# Patient Record
Sex: FEMALE | Race: BLACK OR AFRICAN AMERICAN | Employment: OTHER | URBAN - METROPOLITAN AREA
[De-identification: names, ages, dates, MRNs, and addresses within clinical notes are randomized per-mention and may not be internally consistent; named-entity substitution may affect disease eponyms.]

---

## 2017-01-23 ENCOUNTER — TELEPHONE (OUTPATIENT)
Dept: INTERNAL MEDICINE | Age: 77
End: 2017-01-23

## 2017-01-27 ENCOUNTER — OFFICE VISIT (OUTPATIENT)
Dept: INTERNAL MEDICINE | Age: 77
End: 2017-01-27

## 2017-01-27 VITALS
WEIGHT: 221 LBS | BODY MASS INDEX: 40.67 KG/M2 | HEIGHT: 62 IN | SYSTOLIC BLOOD PRESSURE: 120 MMHG | DIASTOLIC BLOOD PRESSURE: 82 MMHG

## 2017-01-27 DIAGNOSIS — I26.99 OTHER PULMONARY EMBOLISM WITHOUT ACUTE COR PULMONALE, UNSPECIFIED CHRONICITY (HCC): ICD-10-CM

## 2017-01-27 DIAGNOSIS — R91.8 ABNORMAL CT SCAN, LUNG: ICD-10-CM

## 2017-01-27 DIAGNOSIS — I10 ESSENTIAL HYPERTENSION, BENIGN: ICD-10-CM

## 2017-01-27 DIAGNOSIS — K21.9 GASTROESOPHAGEAL REFLUX DISEASE WITHOUT ESOPHAGITIS: ICD-10-CM

## 2017-01-27 DIAGNOSIS — N30.00 ACUTE CYSTITIS WITHOUT HEMATURIA: Primary | ICD-10-CM

## 2017-01-27 LAB
BILIRUBIN, POC: ABNORMAL
BLOOD URINE, POC: ABNORMAL
CLARITY, POC: CLEAR
COLOR, POC: YELLOW
GLUCOSE URINE, POC: ABNORMAL
INR BLD: 2.14 (ref 0.85–1.16)
KETONES, POC: ABNORMAL
LEUKOCYTE EST, POC: ABNORMAL
NITRITE, POC: ABNORMAL
PH, POC: 5
PROTEIN, POC: ABNORMAL
PROTHROMBIN TIME: 24.8 SEC (ref 9.8–13)
SPECIFIC GRAVITY, POC: 1.02
UROBILINOGEN, POC: 2

## 2017-01-27 PROCEDURE — 99213 OFFICE O/P EST LOW 20 MIN: CPT | Performed by: INTERNAL MEDICINE

## 2017-01-27 PROCEDURE — 81002 URINALYSIS NONAUTO W/O SCOPE: CPT | Performed by: INTERNAL MEDICINE

## 2017-01-27 RX ORDER — NITROFURANTOIN 25; 75 MG/1; MG/1
100 CAPSULE ORAL 2 TIMES DAILY
Qty: 14 CAPSULE | Refills: 0 | Status: SHIPPED | OUTPATIENT
Start: 2017-01-27 | End: 2017-02-03

## 2017-01-29 LAB — URINE CULTURE, ROUTINE: NORMAL

## 2017-01-31 ENCOUNTER — ANTI-COAG VISIT (OUTPATIENT)
Dept: INTERNAL MEDICINE | Age: 77
End: 2017-01-31

## 2017-02-06 ENCOUNTER — TELEPHONE (OUTPATIENT)
Dept: INTERNAL MEDICINE | Age: 77
End: 2017-02-06

## 2017-02-10 ENCOUNTER — OFFICE VISIT (OUTPATIENT)
Dept: INTERNAL MEDICINE | Age: 77
End: 2017-02-10

## 2017-02-10 VITALS
DIASTOLIC BLOOD PRESSURE: 86 MMHG | HEIGHT: 62 IN | WEIGHT: 216 LBS | SYSTOLIC BLOOD PRESSURE: 120 MMHG | BODY MASS INDEX: 39.75 KG/M2

## 2017-02-10 DIAGNOSIS — K21.9 GASTROESOPHAGEAL REFLUX DISEASE WITHOUT ESOPHAGITIS: ICD-10-CM

## 2017-02-10 DIAGNOSIS — K57.92 DIVERTICULITIS OF INTESTINE WITHOUT PERFORATION OR ABSCESS WITHOUT BLEEDING, UNSPECIFIED PART OF INTESTINAL TRACT: Primary | ICD-10-CM

## 2017-02-10 LAB
A/G RATIO: 0.8 (ref 1.1–2.2)
ALBUMIN SERPL-MCNC: 3.7 G/DL (ref 3.4–5)
ALP BLD-CCNC: 64 U/L (ref 40–129)
ALT SERPL-CCNC: 9 U/L (ref 10–40)
ANION GAP SERPL CALCULATED.3IONS-SCNC: 17 MMOL/L (ref 3–16)
AST SERPL-CCNC: 19 U/L (ref 15–37)
BACTERIA: ABNORMAL /HPF
BILIRUB SERPL-MCNC: 0.4 MG/DL (ref 0–1)
BILIRUBIN URINE: ABNORMAL
BLOOD, URINE: NEGATIVE
BUN BLDV-MCNC: 13 MG/DL (ref 7–20)
CALCIUM SERPL-MCNC: 10.1 MG/DL (ref 8.3–10.6)
CHLORIDE BLD-SCNC: 104 MMOL/L (ref 99–110)
CLARITY: ABNORMAL
CO2: 23 MMOL/L (ref 21–32)
COLOR: YELLOW
CREAT SERPL-MCNC: 1.2 MG/DL (ref 0.6–1.2)
EPITHELIAL CELLS, UA: 15 /HPF (ref 0–5)
GFR AFRICAN AMERICAN: 52
GFR NON-AFRICAN AMERICAN: 43
GLOBULIN: 4.7 G/DL
GLUCOSE BLD-MCNC: 95 MG/DL (ref 70–99)
GLUCOSE URINE: NEGATIVE MG/DL
HYALINE CASTS: 10 /HPF (ref 0–8)
KETONES, URINE: ABNORMAL MG/DL
LEUKOCYTE ESTERASE, URINE: ABNORMAL
MICROSCOPIC EXAMINATION: YES
NITRITE, URINE: NEGATIVE
PH UA: 5
POTASSIUM SERPL-SCNC: 4.4 MMOL/L (ref 3.5–5.1)
PROTEIN UA: ABNORMAL MG/DL
RBC UA: 2 /HPF (ref 0–4)
SODIUM BLD-SCNC: 144 MMOL/L (ref 136–145)
SPECIFIC GRAVITY UA: 1.02
TOTAL PROTEIN: 8.4 G/DL (ref 6.4–8.2)
URINE TYPE: ABNORMAL
UROBILINOGEN, URINE: 0.2 E.U./DL
WBC UA: 13 /HPF (ref 0–5)

## 2017-02-10 PROCEDURE — 99213 OFFICE O/P EST LOW 20 MIN: CPT | Performed by: INTERNAL MEDICINE

## 2017-02-10 RX ORDER — DOXYCYCLINE HYCLATE 100 MG
100 TABLET ORAL 2 TIMES DAILY
Qty: 20 TABLET | Refills: 0 | Status: SHIPPED | OUTPATIENT
Start: 2017-02-10 | End: 2017-02-20

## 2017-02-10 ASSESSMENT — ENCOUNTER SYMPTOMS
ABDOMINAL PAIN: 1
NAUSEA: 1
SHORTNESS OF BREATH: 0
COUGH: 1
VOMITING: 1
BLOOD IN STOOL: 0
TROUBLE SWALLOWING: 0
DIARRHEA: 0
CONSTIPATION: 1
RECTAL PAIN: 1
CHEST TIGHTNESS: 0

## 2017-02-11 LAB
BASOPHILS ABSOLUTE: 0 K/UL (ref 0–0.2)
BASOPHILS RELATIVE PERCENT: 0.5 %
EOSINOPHILS ABSOLUTE: 0 K/UL (ref 0–0.6)
EOSINOPHILS RELATIVE PERCENT: 0.7 %
HCT VFR BLD CALC: 39.8 % (ref 36–48)
HEMOGLOBIN: 13 G/DL (ref 12–16)
LYMPHOCYTES ABSOLUTE: 1.2 K/UL (ref 1–5.1)
LYMPHOCYTES RELATIVE PERCENT: 38.3 %
MCH RBC QN AUTO: 29.4 PG (ref 26–34)
MCHC RBC AUTO-ENTMCNC: 32.7 G/DL (ref 31–36)
MCV RBC AUTO: 89.9 FL (ref 80–100)
MONOCYTES ABSOLUTE: 0.3 K/UL (ref 0–1.3)
MONOCYTES RELATIVE PERCENT: 9.8 %
NEUTROPHILS ABSOLUTE: 1.6 K/UL (ref 1.7–7.7)
NEUTROPHILS RELATIVE PERCENT: 50.7 %
PDW BLD-RTO: 15.5 % (ref 12.4–15.4)
PLATELET # BLD: 202 K/UL (ref 135–450)
PMV BLD AUTO: 10.2 FL (ref 5–10.5)
RBC # BLD: 4.42 M/UL (ref 4–5.2)
SEDIMENTATION RATE, ERYTHROCYTE: 67 MM/HR (ref 0–30)
WBC # BLD: 3.1 K/UL (ref 4–11)

## 2017-02-13 ENCOUNTER — TELEPHONE (OUTPATIENT)
Dept: INTERNAL MEDICINE | Age: 77
End: 2017-02-13

## 2017-02-13 RX ORDER — PROMETHAZINE HYDROCHLORIDE 12.5 MG/1
12.5 TABLET ORAL EVERY 6 HOURS PRN
Qty: 15 TABLET | Refills: 0 | Status: SHIPPED | OUTPATIENT
Start: 2017-02-13 | End: 2017-02-20

## 2017-02-15 ENCOUNTER — TELEPHONE (OUTPATIENT)
Dept: INTERNAL MEDICINE | Age: 77
End: 2017-02-15

## 2017-02-15 RX ORDER — ONDANSETRON 4 MG/1
4 TABLET, ORALLY DISINTEGRATING ORAL EVERY 8 HOURS PRN
Qty: 20 TABLET | Refills: 0 | Status: SHIPPED | OUTPATIENT
Start: 2017-02-15 | End: 2018-08-17 | Stop reason: CLARIF

## 2017-02-20 ENCOUNTER — OFFICE VISIT (OUTPATIENT)
Dept: INTERNAL MEDICINE | Age: 77
End: 2017-02-20

## 2017-02-20 VITALS
HEIGHT: 62 IN | SYSTOLIC BLOOD PRESSURE: 122 MMHG | BODY MASS INDEX: 39.93 KG/M2 | DIASTOLIC BLOOD PRESSURE: 88 MMHG | WEIGHT: 217 LBS

## 2017-02-20 DIAGNOSIS — K59.00 CONSTIPATION, UNSPECIFIED CONSTIPATION TYPE: ICD-10-CM

## 2017-02-20 DIAGNOSIS — R10.84 GENERALIZED ABDOMINAL PAIN: Primary | ICD-10-CM

## 2017-02-20 PROCEDURE — 99213 OFFICE O/P EST LOW 20 MIN: CPT | Performed by: INTERNAL MEDICINE

## 2017-02-20 ASSESSMENT — ENCOUNTER SYMPTOMS
CHEST TIGHTNESS: 0
RECTAL PAIN: 0
SHORTNESS OF BREATH: 0
DIARRHEA: 0
BLOOD IN STOOL: 0
NAUSEA: 0
COUGH: 1
ABDOMINAL PAIN: 1
CONSTIPATION: 0
VOMITING: 0

## 2017-02-23 ENCOUNTER — TELEPHONE (OUTPATIENT)
Dept: INTERNAL MEDICINE | Age: 77
End: 2017-02-23

## 2017-03-02 RX ORDER — AMLODIPINE BESYLATE 5 MG/1
TABLET ORAL
Qty: 90 TABLET | Refills: 0 | Status: SHIPPED | OUTPATIENT
Start: 2017-03-02 | End: 2017-06-21 | Stop reason: SDUPTHER

## 2017-03-16 ENCOUNTER — OFFICE VISIT (OUTPATIENT)
Dept: INTERNAL MEDICINE | Age: 77
End: 2017-03-16

## 2017-03-16 VITALS
SYSTOLIC BLOOD PRESSURE: 128 MMHG | WEIGHT: 207 LBS | BODY MASS INDEX: 38.09 KG/M2 | HEIGHT: 62 IN | DIASTOLIC BLOOD PRESSURE: 84 MMHG

## 2017-03-16 DIAGNOSIS — I10 ESSENTIAL HYPERTENSION, BENIGN: ICD-10-CM

## 2017-03-16 DIAGNOSIS — K21.9 GASTROESOPHAGEAL REFLUX DISEASE WITHOUT ESOPHAGITIS: ICD-10-CM

## 2017-03-16 DIAGNOSIS — I26.99 OTHER PULMONARY EMBOLISM WITHOUT ACUTE COR PULMONALE, UNSPECIFIED CHRONICITY (HCC): ICD-10-CM

## 2017-03-16 DIAGNOSIS — K21.9 GASTROESOPHAGEAL REFLUX DISEASE WITHOUT ESOPHAGITIS: Primary | ICD-10-CM

## 2017-03-16 LAB
A/G RATIO: 0.9 (ref 1.1–2.2)
ALBUMIN SERPL-MCNC: 3.8 G/DL (ref 3.4–5)
ALP BLD-CCNC: 62 U/L (ref 40–129)
ALT SERPL-CCNC: 10 U/L (ref 10–40)
ANION GAP SERPL CALCULATED.3IONS-SCNC: 16 MMOL/L (ref 3–16)
AST SERPL-CCNC: 16 U/L (ref 15–37)
BASOPHILS ABSOLUTE: 0 K/UL (ref 0–0.2)
BASOPHILS RELATIVE PERCENT: 0.4 %
BILIRUB SERPL-MCNC: 0.4 MG/DL (ref 0–1)
BUN BLDV-MCNC: 15 MG/DL (ref 7–20)
CALCIUM SERPL-MCNC: 9.9 MG/DL (ref 8.3–10.6)
CHLORIDE BLD-SCNC: 105 MMOL/L (ref 99–110)
CO2: 23 MMOL/L (ref 21–32)
CREAT SERPL-MCNC: 1.2 MG/DL (ref 0.6–1.2)
EOSINOPHILS ABSOLUTE: 0 K/UL (ref 0–0.6)
EOSINOPHILS RELATIVE PERCENT: 0.5 %
GFR AFRICAN AMERICAN: 52
GFR NON-AFRICAN AMERICAN: 43
GLOBULIN: 4.4 G/DL
GLUCOSE BLD-MCNC: 93 MG/DL (ref 70–99)
HCT VFR BLD CALC: 40.6 % (ref 36–48)
HEMOGLOBIN: 13 G/DL (ref 12–16)
INR BLD: 1.81 (ref 0.85–1.15)
LYMPHOCYTES ABSOLUTE: 0.9 K/UL (ref 1–5.1)
LYMPHOCYTES RELATIVE PERCENT: 39.2 %
MCH RBC QN AUTO: 29.5 PG (ref 26–34)
MCHC RBC AUTO-ENTMCNC: 31.9 G/DL (ref 31–36)
MCV RBC AUTO: 92.3 FL (ref 80–100)
MONOCYTES ABSOLUTE: 0.2 K/UL (ref 0–1.3)
MONOCYTES RELATIVE PERCENT: 10.8 %
NEUTROPHILS ABSOLUTE: 1.1 K/UL (ref 1.7–7.7)
NEUTROPHILS RELATIVE PERCENT: 49.1 %
PDW BLD-RTO: 16.4 % (ref 12.4–15.4)
PLATELET # BLD: 190 K/UL (ref 135–450)
PMV BLD AUTO: 10.2 FL (ref 5–10.5)
POTASSIUM SERPL-SCNC: 4.5 MMOL/L (ref 3.5–5.1)
PROTHROMBIN TIME: 20.4 SEC (ref 9.6–13)
RBC # BLD: 4.4 M/UL (ref 4–5.2)
SODIUM BLD-SCNC: 144 MMOL/L (ref 136–145)
TOTAL PROTEIN: 8.2 G/DL (ref 6.4–8.2)
WBC # BLD: 2.3 K/UL (ref 4–11)

## 2017-03-16 PROCEDURE — 99213 OFFICE O/P EST LOW 20 MIN: CPT | Performed by: INTERNAL MEDICINE

## 2017-03-17 ENCOUNTER — TELEPHONE (OUTPATIENT)
Dept: INTERNAL MEDICINE | Age: 77
End: 2017-03-17

## 2017-03-17 DIAGNOSIS — I10 ESSENTIAL HYPERTENSION, BENIGN: Primary | ICD-10-CM

## 2017-03-20 LAB
ALBUMIN SERPL-MCNC: 3.2 G/DL (ref 3.1–4.9)
ALPHA-1-GLOBULIN: 0.3 G/DL (ref 0.2–0.4)
ALPHA-2-GLOBULIN: 0.7 G/DL (ref 0.4–1.1)
BETA GLOBULIN: 1.4 G/DL (ref 0.9–1.6)
GAMMA GLOBULIN: 2.5 G/DL (ref 0.6–1.8)
SPE/IFE INTERPRETATION: NORMAL

## 2017-03-22 ENCOUNTER — ANTI-COAG VISIT (OUTPATIENT)
Dept: INTERNAL MEDICINE | Age: 77
End: 2017-03-22

## 2017-04-27 ENCOUNTER — TELEPHONE (OUTPATIENT)
Dept: CASE MANAGEMENT | Age: 77
End: 2017-04-27

## 2017-04-28 RX ORDER — OMEPRAZOLE 20 MG/1
CAPSULE, DELAYED RELEASE ORAL
Qty: 90 CAPSULE | Refills: 0 | Status: SHIPPED | OUTPATIENT
Start: 2017-04-28 | End: 2017-09-19 | Stop reason: SDUPTHER

## 2017-05-09 ENCOUNTER — TELEPHONE (OUTPATIENT)
Dept: INTERNAL MEDICINE | Age: 77
End: 2017-05-09

## 2017-05-09 ENCOUNTER — OFFICE VISIT (OUTPATIENT)
Dept: INTERNAL MEDICINE | Age: 77
End: 2017-05-09

## 2017-05-09 VITALS
BODY MASS INDEX: 35.7 KG/M2 | DIASTOLIC BLOOD PRESSURE: 84 MMHG | HEIGHT: 62 IN | SYSTOLIC BLOOD PRESSURE: 130 MMHG | WEIGHT: 194 LBS

## 2017-05-09 DIAGNOSIS — R91.1 LUNG NODULE: ICD-10-CM

## 2017-05-09 DIAGNOSIS — E55.9 VITAMIN D DEFICIENCY: ICD-10-CM

## 2017-05-09 DIAGNOSIS — I26.99 OTHER PULMONARY EMBOLISM WITHOUT ACUTE COR PULMONALE, UNSPECIFIED CHRONICITY (HCC): ICD-10-CM

## 2017-05-09 DIAGNOSIS — I10 ESSENTIAL HYPERTENSION, BENIGN: ICD-10-CM

## 2017-05-09 DIAGNOSIS — K21.9 GASTROESOPHAGEAL REFLUX DISEASE, ESOPHAGITIS PRESENCE NOT SPECIFIED: ICD-10-CM

## 2017-05-09 DIAGNOSIS — K42.9 UMBILICAL HERNIA WITHOUT OBSTRUCTION AND WITHOUT GANGRENE: ICD-10-CM

## 2017-05-09 DIAGNOSIS — M19.90 OSTEOARTHRITIS, UNSPECIFIED OSTEOARTHRITIS TYPE, UNSPECIFIED SITE: ICD-10-CM

## 2017-05-09 DIAGNOSIS — R91.8 ABNORMAL CT SCAN, LUNG: ICD-10-CM

## 2017-05-09 DIAGNOSIS — K21.9 GASTROESOPHAGEAL REFLUX DISEASE WITHOUT ESOPHAGITIS: ICD-10-CM

## 2017-05-09 DIAGNOSIS — Z00.00 PE (PHYSICAL EXAM), ANNUAL: Primary | ICD-10-CM

## 2017-05-09 LAB
A/G RATIO: 0.9 (ref 1.1–2.2)
ALBUMIN SERPL-MCNC: 3.8 G/DL (ref 3.4–5)
ALP BLD-CCNC: 61 U/L (ref 40–129)
ALT SERPL-CCNC: 10 U/L (ref 10–40)
AMORPHOUS: ABNORMAL /HPF
ANION GAP SERPL CALCULATED.3IONS-SCNC: 16 MMOL/L (ref 3–16)
AST SERPL-CCNC: 19 U/L (ref 15–37)
BACTERIA: ABNORMAL /HPF
BILIRUB SERPL-MCNC: 0.4 MG/DL (ref 0–1)
BILIRUBIN URINE: ABNORMAL
BLOOD, URINE: NEGATIVE
BUN BLDV-MCNC: 17 MG/DL (ref 7–20)
CALCIUM SERPL-MCNC: 10 MG/DL (ref 8.3–10.6)
CHLORIDE BLD-SCNC: 103 MMOL/L (ref 99–110)
CHOLESTEROL, TOTAL: 179 MG/DL (ref 0–199)
CLARITY: ABNORMAL
CO2: 23 MMOL/L (ref 21–32)
COLOR: ABNORMAL
COMMENT UA: ABNORMAL
CREAT SERPL-MCNC: 1.2 MG/DL (ref 0.6–1.2)
EPITHELIAL CELLS, UA: 23 /HPF (ref 0–5)
GFR AFRICAN AMERICAN: 52
GFR NON-AFRICAN AMERICAN: 43
GLOBULIN: 4.4 G/DL
GLUCOSE BLD-MCNC: 92 MG/DL (ref 70–99)
GLUCOSE URINE: NEGATIVE MG/DL
HDLC SERPL-MCNC: 60 MG/DL (ref 40–60)
HYALINE CASTS: 8 /LPF (ref 0–8)
INR BLD: 1.46 (ref 0.85–1.15)
KETONES, URINE: ABNORMAL MG/DL
LDL CHOLESTEROL CALCULATED: 105 MG/DL
LEUKOCYTE ESTERASE, URINE: ABNORMAL
MICROSCOPIC EXAMINATION: YES
NITRITE, URINE: NEGATIVE
PH UA: 5.5
POTASSIUM SERPL-SCNC: 4.4 MMOL/L (ref 3.5–5.1)
PROTEIN UA: 30 MG/DL
PROTHROMBIN TIME: 16.5 SEC (ref 9.6–13)
RBC UA: 2 /HPF (ref 0–4)
SODIUM BLD-SCNC: 142 MMOL/L (ref 136–145)
SPECIFIC GRAVITY UA: 1.03
TOTAL PROTEIN: 8.2 G/DL (ref 6.4–8.2)
TRIGL SERPL-MCNC: 68 MG/DL (ref 0–150)
TSH SERPL DL<=0.05 MIU/L-ACNC: 1.12 UIU/ML (ref 0.27–4.2)
URINE TYPE: ABNORMAL
UROBILINOGEN, URINE: 1 E.U./DL
VLDLC SERPL CALC-MCNC: 14 MG/DL
WBC UA: 6 /HPF (ref 0–5)

## 2017-05-09 PROCEDURE — G0439 PPPS, SUBSEQ VISIT: HCPCS | Performed by: INTERNAL MEDICINE

## 2017-05-09 ASSESSMENT — PATIENT HEALTH QUESTIONNAIRE - PHQ9
1. LITTLE INTEREST OR PLEASURE IN DOING THINGS: 0
SUM OF ALL RESPONSES TO PHQ QUESTIONS 1-9: 0
SUM OF ALL RESPONSES TO PHQ9 QUESTIONS 1 & 2: 0
2. FEELING DOWN, DEPRESSED OR HOPELESS: 0

## 2017-05-10 ENCOUNTER — ANTI-COAG VISIT (OUTPATIENT)
Dept: INTERNAL MEDICINE | Age: 77
End: 2017-05-10

## 2017-05-10 DIAGNOSIS — Z00.00 ANNUAL PHYSICAL EXAM: Primary | ICD-10-CM

## 2017-05-10 LAB
BASOPHILS ABSOLUTE: 0 K/UL (ref 0–0.2)
BASOPHILS RELATIVE PERCENT: 0.5 %
EOSINOPHILS ABSOLUTE: 0 K/UL (ref 0–0.6)
EOSINOPHILS RELATIVE PERCENT: 1.9 %
HCT VFR BLD CALC: 41.2 % (ref 36–48)
HEMATOLOGY PATH CONSULT: NO
HEMOGLOBIN: 13.1 G/DL (ref 12–16)
LYMPHOCYTES ABSOLUTE: 1 K/UL (ref 1–5.1)
LYMPHOCYTES RELATIVE PERCENT: 50.9 %
MCH RBC QN AUTO: 29.5 PG (ref 26–34)
MCHC RBC AUTO-ENTMCNC: 31.8 G/DL (ref 31–36)
MCV RBC AUTO: 92.7 FL (ref 80–100)
MONOCYTES ABSOLUTE: 0.2 K/UL (ref 0–1.3)
MONOCYTES RELATIVE PERCENT: 10.9 %
NEUTROPHILS ABSOLUTE: 0.7 K/UL (ref 1.7–7.7)
NEUTROPHILS RELATIVE PERCENT: 35.8 %
PDW BLD-RTO: 16.7 % (ref 12.4–15.4)
PLATELET # BLD: 208 K/UL (ref 135–450)
PMV BLD AUTO: 10.2 FL (ref 5–10.5)
RBC # BLD: 4.44 M/UL (ref 4–5.2)
VITAMIN D 25-HYDROXY: 66.6 NG/ML
WBC # BLD: 1.9 K/UL (ref 4–11)

## 2017-05-19 ENCOUNTER — OFFICE VISIT (OUTPATIENT)
Dept: SURGERY | Age: 77
End: 2017-05-19

## 2017-05-19 VITALS
TEMPERATURE: 98.9 F | BODY MASS INDEX: 35.33 KG/M2 | HEIGHT: 62 IN | WEIGHT: 192 LBS | SYSTOLIC BLOOD PRESSURE: 137 MMHG | DIASTOLIC BLOOD PRESSURE: 88 MMHG

## 2017-05-19 DIAGNOSIS — K42.9 RECURRENT UMBILICAL HERNIA: Primary | ICD-10-CM

## 2017-05-19 PROCEDURE — 99203 OFFICE O/P NEW LOW 30 MIN: CPT | Performed by: SURGERY

## 2017-05-23 ENCOUNTER — TELEPHONE (OUTPATIENT)
Dept: SURGERY | Age: 77
End: 2017-05-23

## 2017-05-23 ENCOUNTER — TELEPHONE (OUTPATIENT)
Dept: INTERNAL MEDICINE | Age: 77
End: 2017-05-23

## 2017-05-26 ENCOUNTER — OFFICE VISIT (OUTPATIENT)
Dept: INTERNAL MEDICINE | Age: 77
End: 2017-05-26

## 2017-05-26 VITALS
HEIGHT: 62 IN | SYSTOLIC BLOOD PRESSURE: 136 MMHG | WEIGHT: 191 LBS | BODY MASS INDEX: 35.15 KG/M2 | DIASTOLIC BLOOD PRESSURE: 80 MMHG

## 2017-05-26 DIAGNOSIS — K43.9 VENTRAL HERNIA WITHOUT OBSTRUCTION OR GANGRENE: Primary | ICD-10-CM

## 2017-05-26 DIAGNOSIS — I26.99 OTHER PULMONARY EMBOLISM WITHOUT ACUTE COR PULMONALE, UNSPECIFIED CHRONICITY (HCC): ICD-10-CM

## 2017-05-26 DIAGNOSIS — I10 ESSENTIAL HYPERTENSION, BENIGN: ICD-10-CM

## 2017-05-26 DIAGNOSIS — Z01.818 PREOP EXAMINATION: ICD-10-CM

## 2017-05-26 PROCEDURE — 99214 OFFICE O/P EST MOD 30 MIN: CPT | Performed by: INTERNAL MEDICINE

## 2017-06-01 ENCOUNTER — HOSPITAL ENCOUNTER (OUTPATIENT)
Dept: PREADMISSION TESTING | Age: 77
Discharge: HOME | End: 2017-06-01
Attending: SURGERY | Admitting: SURGERY

## 2017-06-01 VITALS — WEIGHT: 191 LBS | HEIGHT: 62 IN | BODY MASS INDEX: 35.15 KG/M2

## 2017-06-01 RX ORDER — CHLORHEXIDINE GLUCONATE 0.12 MG/ML
15 RINSE ORAL 2 TIMES DAILY
Status: CANCELLED | OUTPATIENT
Start: 2017-06-01

## 2017-06-06 ENCOUNTER — HOSPITAL ENCOUNTER (OUTPATIENT)
Dept: SURGERY | Age: 77
Discharge: HOME | End: 2017-06-06
Admitting: SURGERY

## 2017-06-06 VITALS
DIASTOLIC BLOOD PRESSURE: 79 MMHG | SYSTOLIC BLOOD PRESSURE: 129 MMHG | TEMPERATURE: 97.4 F | HEART RATE: 65 BPM | BODY MASS INDEX: 35.15 KG/M2 | RESPIRATION RATE: 14 BRPM | OXYGEN SATURATION: 91 % | HEIGHT: 62 IN | WEIGHT: 191 LBS

## 2017-06-06 LAB
APTT: 32.5 SEC (ref 21–31.8)
HCT VFR BLD CALC: 35.3 % (ref 36–48)
HEMOGLOBIN: 11.7 G/DL (ref 12–16)
INR BLD: 1.42 (ref 0.85–1.15)
MCH RBC QN AUTO: 29.5 PG (ref 26–34)
MCHC RBC AUTO-ENTMCNC: 33 G/DL (ref 31–36)
MCV RBC AUTO: 89.3 FL (ref 80–100)
PDW BLD-RTO: 15.7 % (ref 12.4–15.4)
PLATELET # BLD: 160 K/UL (ref 135–450)
PMV BLD AUTO: 9 FL (ref 5–10.5)
PROTHROMBIN TIME: 16.1 SEC (ref 9.6–13)
RBC # BLD: 3.96 M/UL (ref 4–5.2)
WBC # BLD: 2.7 K/UL (ref 4–11)

## 2017-06-06 PROCEDURE — 49653 PR LAP, VENTRAL HERNIA REPAIR,INCARCERATED: CPT | Performed by: SURGERY

## 2017-06-06 RX ORDER — CHLORHEXIDINE GLUCONATE 0.12 MG/ML
15 RINSE ORAL 2 TIMES DAILY
Status: DISCONTINUED | OUTPATIENT
Start: 2017-06-06 | End: 2017-06-07 | Stop reason: HOSPADM

## 2017-06-06 RX ORDER — SODIUM CHLORIDE, SODIUM LACTATE, POTASSIUM CHLORIDE, CALCIUM CHLORIDE 600; 310; 30; 20 MG/100ML; MG/100ML; MG/100ML; MG/100ML
INJECTION, SOLUTION INTRAVENOUS CONTINUOUS
Status: DISCONTINUED | OUTPATIENT
Start: 2017-06-06 | End: 2017-06-07 | Stop reason: HOSPADM

## 2017-06-06 RX ORDER — FENTANYL CITRATE 50 UG/ML
25 INJECTION, SOLUTION INTRAMUSCULAR; INTRAVENOUS EVERY 5 MIN PRN
Status: DISCONTINUED | OUTPATIENT
Start: 2017-06-06 | End: 2017-06-07 | Stop reason: HOSPADM

## 2017-06-06 RX ORDER — OXYCODONE HYDROCHLORIDE AND ACETAMINOPHEN 5; 325 MG/1; MG/1
1 TABLET ORAL EVERY 6 HOURS PRN
Qty: 30 TABLET | Refills: 0 | Status: SHIPPED | OUTPATIENT
Start: 2017-06-06 | End: 2017-06-13

## 2017-06-06 RX ORDER — SODIUM CHLORIDE 0.9 % (FLUSH) 0.9 %
10 SYRINGE (ML) INJECTION PRN
Status: DISCONTINUED | OUTPATIENT
Start: 2017-06-06 | End: 2017-06-07 | Stop reason: HOSPADM

## 2017-06-06 RX ORDER — SODIUM CHLORIDE 0.9 % (FLUSH) 0.9 %
10 SYRINGE (ML) INJECTION EVERY 12 HOURS SCHEDULED
Status: DISCONTINUED | OUTPATIENT
Start: 2017-06-06 | End: 2017-06-07 | Stop reason: HOSPADM

## 2017-06-06 RX ADMIN — SODIUM CHLORIDE, SODIUM LACTATE, POTASSIUM CHLORIDE, CALCIUM CHLORIDE: 600; 310; 30; 20 INJECTION, SOLUTION INTRAVENOUS at 08:26

## 2017-06-06 RX ADMIN — Medication 0.5 MG: at 11:16

## 2017-06-06 RX ADMIN — Medication 0.5 MG: at 10:52

## 2017-06-06 ASSESSMENT — PAIN DESCRIPTION - FREQUENCY: FREQUENCY: CONTINUOUS

## 2017-06-06 ASSESSMENT — PAIN DESCRIPTION - DESCRIPTORS: DESCRIPTORS: ACHING

## 2017-06-06 ASSESSMENT — PAIN SCALES - GENERAL
PAINLEVEL_OUTOF10: 7
PAINLEVEL_OUTOF10: 0
PAINLEVEL_OUTOF10: 5
PAINLEVEL_OUTOF10: 3
PAINLEVEL_OUTOF10: 3
PAINLEVEL_OUTOF10: 6

## 2017-06-06 ASSESSMENT — PAIN - FUNCTIONAL ASSESSMENT: PAIN_FUNCTIONAL_ASSESSMENT: 0-10

## 2017-06-06 ASSESSMENT — PAIN DESCRIPTION - LOCATION: LOCATION: ABDOMEN

## 2017-06-06 ASSESSMENT — PAIN DESCRIPTION - PROGRESSION: CLINICAL_PROGRESSION: GRADUALLY IMPROVING

## 2017-06-06 ASSESSMENT — PAIN DESCRIPTION - ONSET: ONSET: ON-GOING

## 2017-06-06 ASSESSMENT — PAIN DESCRIPTION - PAIN TYPE: TYPE: SURGICAL PAIN

## 2017-06-07 ENCOUNTER — TELEPHONE (OUTPATIENT)
Dept: INTERNAL MEDICINE | Age: 77
End: 2017-06-07

## 2017-06-07 ENCOUNTER — TELEPHONE (OUTPATIENT)
Dept: BARIATRICS/WEIGHT MGMT | Age: 77
End: 2017-06-07

## 2017-06-20 ENCOUNTER — HOSPITAL ENCOUNTER (OUTPATIENT)
Dept: CT IMAGING | Age: 77
Discharge: HOME | End: 2017-06-20
Admitting: INTERNAL MEDICINE

## 2017-06-20 DIAGNOSIS — R91.8 ABNORMAL CT SCAN, LUNG: ICD-10-CM

## 2017-06-20 DIAGNOSIS — R91.1 LUNG NODULE: ICD-10-CM

## 2017-06-20 DIAGNOSIS — R91.8 OTHER NONSPECIFIC ABNORMAL FINDING OF LUNG FIELD: ICD-10-CM

## 2017-06-21 ENCOUNTER — OFFICE VISIT (OUTPATIENT)
Dept: INTERNAL MEDICINE | Age: 77
End: 2017-06-21

## 2017-06-21 VITALS
SYSTOLIC BLOOD PRESSURE: 122 MMHG | WEIGHT: 193 LBS | BODY MASS INDEX: 35.51 KG/M2 | HEIGHT: 62 IN | DIASTOLIC BLOOD PRESSURE: 80 MMHG

## 2017-06-21 DIAGNOSIS — I26.99 OTHER PULMONARY EMBOLISM WITHOUT ACUTE COR PULMONALE, UNSPECIFIED CHRONICITY (HCC): Primary | ICD-10-CM

## 2017-06-21 DIAGNOSIS — I10 ESSENTIAL HYPERTENSION, BENIGN: ICD-10-CM

## 2017-06-21 DIAGNOSIS — I26.99 OTHER PULMONARY EMBOLISM WITHOUT ACUTE COR PULMONALE, UNSPECIFIED CHRONICITY (HCC): ICD-10-CM

## 2017-06-21 DIAGNOSIS — K43.0 INCISIONAL HERNIA, INCARCERATED: ICD-10-CM

## 2017-06-21 LAB
ALBUMIN SERPL-MCNC: 3.2 G/DL (ref 3.4–5)
ANION GAP SERPL CALCULATED.3IONS-SCNC: 16 MMOL/L (ref 3–16)
BUN BLDV-MCNC: 12 MG/DL (ref 7–20)
CALCIUM SERPL-MCNC: 9.3 MG/DL (ref 8.3–10.6)
CHLORIDE BLD-SCNC: 107 MMOL/L (ref 99–110)
CO2: 24 MMOL/L (ref 21–32)
CREAT SERPL-MCNC: 1.1 MG/DL (ref 0.6–1.2)
GFR AFRICAN AMERICAN: 58
GFR NON-AFRICAN AMERICAN: 48
GLUCOSE BLD-MCNC: 93 MG/DL (ref 70–99)
HCT VFR BLD CALC: 35.4 % (ref 36–48)
HEMOGLOBIN: 11.2 G/DL (ref 12–16)
INR BLD: 2.52 (ref 0.85–1.15)
MCH RBC QN AUTO: 29.2 PG (ref 26–34)
MCHC RBC AUTO-ENTMCNC: 31.7 G/DL (ref 31–36)
MCV RBC AUTO: 92 FL (ref 80–100)
PDW BLD-RTO: 15.5 % (ref 12.4–15.4)
PHOSPHORUS: 3 MG/DL (ref 2.5–4.9)
PLATELET # BLD: 360 K/UL (ref 135–450)
PMV BLD AUTO: 9.1 FL (ref 5–10.5)
POTASSIUM SERPL-SCNC: 4.6 MMOL/L (ref 3.5–5.1)
PROTHROMBIN TIME: 28.5 SEC (ref 9.6–13)
RBC # BLD: 3.84 M/UL (ref 4–5.2)
SODIUM BLD-SCNC: 147 MMOL/L (ref 136–145)
WBC # BLD: 2.9 K/UL (ref 4–11)

## 2017-06-21 PROCEDURE — 99213 OFFICE O/P EST LOW 20 MIN: CPT | Performed by: INTERNAL MEDICINE

## 2017-06-21 RX ORDER — AMLODIPINE BESYLATE 5 MG/1
TABLET ORAL
Qty: 90 TABLET | Refills: 3 | Status: SHIPPED | OUTPATIENT
Start: 2017-06-21 | End: 2018-06-14 | Stop reason: SDUPTHER

## 2017-06-21 RX ORDER — WARFARIN SODIUM 5 MG/1
TABLET ORAL
Qty: 100 TABLET | Refills: 1 | Status: SHIPPED | OUTPATIENT
Start: 2017-06-21 | End: 2018-01-19 | Stop reason: SDUPTHER

## 2017-06-22 ENCOUNTER — ANTI-COAG VISIT (OUTPATIENT)
Dept: INTERNAL MEDICINE | Age: 77
End: 2017-06-22

## 2017-06-23 ENCOUNTER — OFFICE VISIT (OUTPATIENT)
Dept: SURGERY | Age: 77
End: 2017-06-23

## 2017-06-23 VITALS
WEIGHT: 193 LBS | SYSTOLIC BLOOD PRESSURE: 141 MMHG | BODY MASS INDEX: 35.51 KG/M2 | DIASTOLIC BLOOD PRESSURE: 95 MMHG | HEIGHT: 62 IN | TEMPERATURE: 98.2 F

## 2017-06-23 DIAGNOSIS — Z98.890 S/P LAPAROSCOPIC HERNIA REPAIR: Primary | ICD-10-CM

## 2017-06-23 DIAGNOSIS — D64.9 ANEMIA, UNSPECIFIED TYPE: Primary | ICD-10-CM

## 2017-06-23 DIAGNOSIS — Z87.19 S/P LAPAROSCOPIC HERNIA REPAIR: Primary | ICD-10-CM

## 2017-06-23 LAB
IRON SATURATION: 32 % (ref 15–50)
IRON: 55 UG/DL (ref 37–145)
TOTAL IRON BINDING CAPACITY: 171 UG/DL (ref 260–445)

## 2017-06-23 PROCEDURE — 99024 POSTOP FOLLOW-UP VISIT: CPT | Performed by: SURGERY

## 2017-07-25 DIAGNOSIS — I26.99 OTHER PULMONARY EMBOLISM WITHOUT ACUTE COR PULMONALE, UNSPECIFIED CHRONICITY (HCC): ICD-10-CM

## 2017-07-25 DIAGNOSIS — Z79.01 LONG TERM (CURRENT) USE OF ANTICOAGULANTS: ICD-10-CM

## 2017-07-25 DIAGNOSIS — Z79.01 LONG TERM (CURRENT) USE OF ANTICOAGULANTS: Primary | ICD-10-CM

## 2017-07-25 LAB
INR BLD: 1.34 (ref 0.85–1.15)
PROTHROMBIN TIME: 15.1 SEC (ref 9.6–13)

## 2017-07-26 ENCOUNTER — ANTI-COAG VISIT (OUTPATIENT)
Dept: INTERNAL MEDICINE | Age: 77
End: 2017-07-26

## 2017-08-04 ENCOUNTER — OFFICE VISIT (OUTPATIENT)
Dept: INTERNAL MEDICINE | Age: 77
End: 2017-08-04

## 2017-08-04 VITALS
SYSTOLIC BLOOD PRESSURE: 128 MMHG | BODY MASS INDEX: 34.78 KG/M2 | DIASTOLIC BLOOD PRESSURE: 78 MMHG | HEIGHT: 62 IN | WEIGHT: 189 LBS

## 2017-08-04 DIAGNOSIS — I10 ESSENTIAL HYPERTENSION, BENIGN: ICD-10-CM

## 2017-08-04 DIAGNOSIS — I26.99 OTHER PULMONARY EMBOLISM WITHOUT ACUTE COR PULMONALE, UNSPECIFIED CHRONICITY (HCC): Primary | ICD-10-CM

## 2017-08-04 DIAGNOSIS — M19.90 OSTEOARTHRITIS, UNSPECIFIED OSTEOARTHRITIS TYPE, UNSPECIFIED SITE: ICD-10-CM

## 2017-08-04 LAB
INR BLD: 2.81 (ref 0.85–1.15)
PROTHROMBIN TIME: 31.7 SEC (ref 9.6–13)

## 2017-08-04 PROCEDURE — 99213 OFFICE O/P EST LOW 20 MIN: CPT | Performed by: INTERNAL MEDICINE

## 2017-08-04 ASSESSMENT — ENCOUNTER SYMPTOMS
VOMITING: 0
COUGH: 0
CHEST TIGHTNESS: 0
SHORTNESS OF BREATH: 0
WHEEZING: 0
NAUSEA: 0
CONSTIPATION: 0
DIARRHEA: 0

## 2017-08-07 ENCOUNTER — ANTI-COAG VISIT (OUTPATIENT)
Dept: INTERNAL MEDICINE | Age: 77
End: 2017-08-07

## 2017-09-20 RX ORDER — OMEPRAZOLE 20 MG/1
CAPSULE, DELAYED RELEASE ORAL
Qty: 90 CAPSULE | Refills: 0 | Status: SHIPPED | OUTPATIENT
Start: 2017-09-20 | End: 2017-09-20 | Stop reason: SDUPTHER

## 2017-09-20 RX ORDER — OMEPRAZOLE 20 MG/1
CAPSULE, DELAYED RELEASE ORAL
Qty: 90 CAPSULE | Refills: 0 | Status: SHIPPED | OUTPATIENT
Start: 2017-09-20 | End: 2018-08-17 | Stop reason: CLARIF

## 2017-11-08 DIAGNOSIS — I26.99 OTHER PULMONARY EMBOLISM WITHOUT ACUTE COR PULMONALE, UNSPECIFIED CHRONICITY (HCC): ICD-10-CM

## 2017-11-08 DIAGNOSIS — Z79.01 LONG TERM CURRENT USE OF ANTICOAGULANT THERAPY: ICD-10-CM

## 2017-11-08 LAB
INR BLD: 2.58 (ref 0.85–1.15)
PROTHROMBIN TIME: 29.1 SEC (ref 9.6–13)

## 2017-11-13 ENCOUNTER — ANTI-COAG VISIT (OUTPATIENT)
Dept: INTERNAL MEDICINE | Age: 77
End: 2017-11-13

## 2018-01-19 DIAGNOSIS — I26.99 OTHER PULMONARY EMBOLISM WITHOUT ACUTE COR PULMONALE, UNSPECIFIED CHRONICITY (HCC): ICD-10-CM

## 2018-01-19 RX ORDER — WARFARIN SODIUM 5 MG/1
TABLET ORAL
Qty: 100 TABLET | Refills: 0 | Status: SHIPPED | OUTPATIENT
Start: 2018-01-19 | End: 2018-04-27 | Stop reason: SDUPTHER

## 2018-04-27 DIAGNOSIS — I26.99 OTHER PULMONARY EMBOLISM WITHOUT ACUTE COR PULMONALE, UNSPECIFIED CHRONICITY (HCC): ICD-10-CM

## 2018-04-27 RX ORDER — WARFARIN SODIUM 5 MG/1
TABLET ORAL
Qty: 100 TABLET | Refills: 0 | Status: SHIPPED | OUTPATIENT
Start: 2018-04-27 | End: 2018-09-07 | Stop reason: SDUPTHER

## 2018-06-02 ENCOUNTER — OFFICE VISIT (OUTPATIENT)
Dept: INTERNAL MEDICINE | Age: 78
End: 2018-06-02

## 2018-06-02 VITALS
SYSTOLIC BLOOD PRESSURE: 124 MMHG | OXYGEN SATURATION: 94 % | BODY MASS INDEX: 37.73 KG/M2 | WEIGHT: 205 LBS | HEIGHT: 62 IN | DIASTOLIC BLOOD PRESSURE: 82 MMHG | HEART RATE: 76 BPM

## 2018-06-02 DIAGNOSIS — K21.9 GASTROESOPHAGEAL REFLUX DISEASE WITHOUT ESOPHAGITIS: ICD-10-CM

## 2018-06-02 DIAGNOSIS — I10 ESSENTIAL HYPERTENSION, BENIGN: ICD-10-CM

## 2018-06-02 DIAGNOSIS — Z00.00 WELL ADULT EXAM: Primary | ICD-10-CM

## 2018-06-02 DIAGNOSIS — I26.99 OTHER PULMONARY EMBOLISM WITHOUT ACUTE COR PULMONALE, UNSPECIFIED CHRONICITY (HCC): ICD-10-CM

## 2018-06-02 DIAGNOSIS — R92.8 ABNORMAL MAMMOGRAM: ICD-10-CM

## 2018-06-02 DIAGNOSIS — E55.9 VITAMIN D DEFICIENCY: ICD-10-CM

## 2018-06-02 DIAGNOSIS — M19.90 OSTEOARTHRITIS, UNSPECIFIED OSTEOARTHRITIS TYPE, UNSPECIFIED SITE: ICD-10-CM

## 2018-06-02 DIAGNOSIS — C50.919 MALIGNANT NEOPLASM OF FEMALE BREAST, UNSPECIFIED ESTROGEN RECEPTOR STATUS, UNSPECIFIED LATERALITY, UNSPECIFIED SITE OF BREAST (HCC): ICD-10-CM

## 2018-06-02 DIAGNOSIS — R91.8 ABNORMAL CT SCAN, LUNG: ICD-10-CM

## 2018-06-02 DIAGNOSIS — Z00.00 WELL ADULT EXAM: ICD-10-CM

## 2018-06-02 LAB
A/G RATIO: 0.9 (ref 1.1–2.2)
ALBUMIN SERPL-MCNC: 3.8 G/DL (ref 3.4–5)
ALP BLD-CCNC: 66 U/L (ref 40–129)
ALT SERPL-CCNC: 11 U/L (ref 10–40)
ANION GAP SERPL CALCULATED.3IONS-SCNC: 13 MMOL/L (ref 3–16)
AST SERPL-CCNC: 20 U/L (ref 15–37)
BACTERIA: ABNORMAL /HPF
BILIRUB SERPL-MCNC: 0.4 MG/DL (ref 0–1)
BILIRUBIN URINE: NEGATIVE
BLOOD, URINE: NEGATIVE
BUN BLDV-MCNC: 21 MG/DL (ref 7–20)
CALCIUM SERPL-MCNC: 9.8 MG/DL (ref 8.3–10.6)
CHLORIDE BLD-SCNC: 102 MMOL/L (ref 99–110)
CHOLESTEROL, TOTAL: 160 MG/DL (ref 0–199)
CLARITY: ABNORMAL
CO2: 25 MMOL/L (ref 21–32)
COLOR: YELLOW
CREAT SERPL-MCNC: 1 MG/DL (ref 0.6–1.2)
EPITHELIAL CELLS, UA: 10 /HPF (ref 0–5)
GFR AFRICAN AMERICAN: >60
GFR NON-AFRICAN AMERICAN: 53
GLOBULIN: 4.4 G/DL
GLUCOSE BLD-MCNC: 95 MG/DL (ref 70–99)
GLUCOSE URINE: NEGATIVE MG/DL
HDLC SERPL-MCNC: 64 MG/DL (ref 40–60)
HYALINE CASTS: 2 /LPF (ref 0–8)
KETONES, URINE: NEGATIVE MG/DL
LDL CHOLESTEROL CALCULATED: 84 MG/DL
LEUKOCYTE ESTERASE, URINE: ABNORMAL
MICROSCOPIC EXAMINATION: YES
NITRITE, URINE: NEGATIVE
PH UA: 5.5
POTASSIUM SERPL-SCNC: 4.2 MMOL/L (ref 3.5–5.1)
PROTEIN UA: NEGATIVE MG/DL
RBC UA: 2 /HPF (ref 0–4)
SODIUM BLD-SCNC: 140 MMOL/L (ref 136–145)
SPECIFIC GRAVITY UA: 1.02
TOTAL PROTEIN: 8.2 G/DL (ref 6.4–8.2)
TRIGL SERPL-MCNC: 58 MG/DL (ref 0–150)
TSH SERPL DL<=0.05 MIU/L-ACNC: 1.67 UIU/ML (ref 0.27–4.2)
URINE TYPE: ABNORMAL
UROBILINOGEN, URINE: 0.2 E.U./DL
VITAMIN D 25-HYDROXY: 44.7 NG/ML
VLDLC SERPL CALC-MCNC: 12 MG/DL
WBC UA: 6 /HPF (ref 0–5)

## 2018-06-02 PROCEDURE — 93000 ELECTROCARDIOGRAM COMPLETE: CPT | Performed by: INTERNAL MEDICINE

## 2018-06-02 PROCEDURE — 85610 PROTHROMBIN TIME: CPT | Performed by: INTERNAL MEDICINE

## 2018-06-02 PROCEDURE — 4040F PNEUMOC VAC/ADMIN/RCVD: CPT | Performed by: INTERNAL MEDICINE

## 2018-06-02 PROCEDURE — G0439 PPPS, SUBSEQ VISIT: HCPCS | Performed by: INTERNAL MEDICINE

## 2018-06-02 ASSESSMENT — PATIENT HEALTH QUESTIONNAIRE - PHQ9
SUM OF ALL RESPONSES TO PHQ QUESTIONS 1-9: 0
SUM OF ALL RESPONSES TO PHQ9 QUESTIONS 1 & 2: 0
1. LITTLE INTEREST OR PLEASURE IN DOING THINGS: 0
2. FEELING DOWN, DEPRESSED OR HOPELESS: 0

## 2018-06-04 LAB
ANISOCYTOSIS: ABNORMAL
ATYPICAL LYMPHOCYTE RELATIVE PERCENT: 3 % (ref 0–6)
BASOPHILS ABSOLUTE: 0 K/UL (ref 0–0.2)
BASOPHILS RELATIVE PERCENT: 0 %
EOSINOPHILS ABSOLUTE: 0 K/UL (ref 0–0.6)
EOSINOPHILS RELATIVE PERCENT: 0 %
HCT VFR BLD CALC: 40.8 % (ref 36–48)
HEMATOLOGY PATH CONSULT: NO
HEMOGLOBIN: 13.7 G/DL (ref 12–16)
LYMPHOCYTES ABSOLUTE: 1.2 K/UL (ref 1–5.1)
LYMPHOCYTES RELATIVE PERCENT: 52 %
MCH RBC QN AUTO: 30.3 PG (ref 26–34)
MCHC RBC AUTO-ENTMCNC: 33.5 G/DL (ref 31–36)
MCV RBC AUTO: 90.4 FL (ref 80–100)
MONOCYTES ABSOLUTE: 0.1 K/UL (ref 0–1.3)
MONOCYTES RELATIVE PERCENT: 6 %
NEUTROPHILS ABSOLUTE: 0.8 K/UL (ref 1.7–7.7)
NEUTROPHILS RELATIVE PERCENT: 39 %
PDW BLD-RTO: 15.6 % (ref 12.4–15.4)
PLATELET # BLD: 195 K/UL (ref 135–450)
PMV BLD AUTO: 10 FL (ref 5–10.5)
RBC # BLD: 4.51 M/UL (ref 4–5.2)
WBC # BLD: 2.1 K/UL (ref 4–11)

## 2018-06-08 ENCOUNTER — HOSPITAL ENCOUNTER (OUTPATIENT)
Dept: CT IMAGING | Age: 78
Discharge: HOME | End: 2018-06-08
Attending: INTERNAL MEDICINE | Admitting: INTERNAL MEDICINE

## 2018-06-08 DIAGNOSIS — I10 ESSENTIAL HYPERTENSION, BENIGN: ICD-10-CM

## 2018-06-08 DIAGNOSIS — Z00.00 ENCOUNTER FOR GENERAL ADULT MEDICAL EXAMINATION WITHOUT ABNORMAL FINDINGS: ICD-10-CM

## 2018-06-08 DIAGNOSIS — R91.8 ABNORMAL CT SCAN, LUNG: ICD-10-CM

## 2018-06-08 DIAGNOSIS — Z00.00 WELL ADULT EXAM: ICD-10-CM

## 2018-06-08 DIAGNOSIS — K21.9 GASTROESOPHAGEAL REFLUX DISEASE WITHOUT ESOPHAGITIS: ICD-10-CM

## 2018-06-14 DIAGNOSIS — I10 ESSENTIAL HYPERTENSION, BENIGN: ICD-10-CM

## 2018-06-14 RX ORDER — AMLODIPINE BESYLATE 5 MG/1
TABLET ORAL
Qty: 90 TABLET | Refills: 2 | Status: SHIPPED | OUTPATIENT
Start: 2018-06-14 | End: 2018-12-05 | Stop reason: SDUPTHER

## 2018-06-15 ENCOUNTER — HOSPITAL ENCOUNTER (OUTPATIENT)
Dept: MAMMOGRAPHY | Age: 78
Discharge: HOME | End: 2018-06-15
Attending: INTERNAL MEDICINE | Admitting: INTERNAL MEDICINE

## 2018-06-15 DIAGNOSIS — R92.8 ABNORMAL MAMMOGRAM: ICD-10-CM

## 2018-06-15 DIAGNOSIS — R92.8 OTHER ABNORMAL AND INCONCLUSIVE FINDINGS ON DIAGNOSTIC IMAGING OF BREAST: ICD-10-CM

## 2018-06-26 ENCOUNTER — HOSPITAL ENCOUNTER (OUTPATIENT)
Dept: MAMMOGRAPHY | Age: 78
Discharge: HOME | End: 2018-06-26
Attending: INTERNAL MEDICINE | Admitting: INTERNAL MEDICINE

## 2018-06-26 ENCOUNTER — TELEPHONE (OUTPATIENT)
Dept: INTERNAL MEDICINE | Age: 78
End: 2018-06-26

## 2018-06-26 DIAGNOSIS — R92.8 ABNORMAL MAMMOGRAM: ICD-10-CM

## 2018-06-26 DIAGNOSIS — R92.8 ABNORMAL FINDING ON MAMMOGRAPHY: ICD-10-CM

## 2018-06-27 ENCOUNTER — TELEPHONE (OUTPATIENT)
Dept: INTERNAL MEDICINE | Age: 78
End: 2018-06-27

## 2018-06-27 RX ORDER — DICLOFENAC SODIUM 75 MG/1
75 TABLET, DELAYED RELEASE ORAL 2 TIMES DAILY
Qty: 28 TABLET | Refills: 0 | Status: SHIPPED | OUTPATIENT
Start: 2018-06-27 | End: 2018-06-28

## 2018-06-28 ENCOUNTER — OFFICE VISIT (OUTPATIENT)
Dept: INTERNAL MEDICINE | Age: 78
End: 2018-06-28

## 2018-06-28 VITALS
SYSTOLIC BLOOD PRESSURE: 138 MMHG | WEIGHT: 209 LBS | DIASTOLIC BLOOD PRESSURE: 76 MMHG | HEIGHT: 62 IN | BODY MASS INDEX: 38.46 KG/M2

## 2018-06-28 DIAGNOSIS — Z01.818 PREOP EXAMINATION: Primary | ICD-10-CM

## 2018-06-28 DIAGNOSIS — I26.99 OTHER PULMONARY EMBOLISM WITHOUT ACUTE COR PULMONALE, UNSPECIFIED CHRONICITY (HCC): ICD-10-CM

## 2018-06-28 DIAGNOSIS — I10 ESSENTIAL HYPERTENSION, BENIGN: ICD-10-CM

## 2018-06-28 DIAGNOSIS — K21.9 GASTROESOPHAGEAL REFLUX DISEASE WITHOUT ESOPHAGITIS: ICD-10-CM

## 2018-06-28 DIAGNOSIS — M17.11 OSTEOARTHRITIS OF RIGHT KNEE, UNSPECIFIED OSTEOARTHRITIS TYPE: ICD-10-CM

## 2018-06-28 PROCEDURE — G8427 DOCREV CUR MEDS BY ELIG CLIN: HCPCS | Performed by: INTERNAL MEDICINE

## 2018-06-28 PROCEDURE — 4040F PNEUMOC VAC/ADMIN/RCVD: CPT | Performed by: INTERNAL MEDICINE

## 2018-06-28 PROCEDURE — 1036F TOBACCO NON-USER: CPT | Performed by: INTERNAL MEDICINE

## 2018-06-28 PROCEDURE — G8399 PT W/DXA RESULTS DOCUMENT: HCPCS | Performed by: INTERNAL MEDICINE

## 2018-06-28 PROCEDURE — 1123F ACP DISCUSS/DSCN MKR DOCD: CPT | Performed by: INTERNAL MEDICINE

## 2018-06-28 PROCEDURE — 93000 ELECTROCARDIOGRAM COMPLETE: CPT | Performed by: INTERNAL MEDICINE

## 2018-06-28 PROCEDURE — 99214 OFFICE O/P EST MOD 30 MIN: CPT | Performed by: INTERNAL MEDICINE

## 2018-06-28 PROCEDURE — G8417 CALC BMI ABV UP PARAM F/U: HCPCS | Performed by: INTERNAL MEDICINE

## 2018-06-28 PROCEDURE — 1090F PRES/ABSN URINE INCON ASSESS: CPT | Performed by: INTERNAL MEDICINE

## 2018-07-05 ENCOUNTER — HOSPITAL ENCOUNTER (OUTPATIENT)
Dept: ULTRASOUND IMAGING | Age: 78
Discharge: HOME | End: 2018-07-05
Attending: INTERNAL MEDICINE | Admitting: INTERNAL MEDICINE

## 2018-07-05 DIAGNOSIS — R92.8 ABNORMAL ULTRASOUND OF BREAST: ICD-10-CM

## 2018-07-05 DIAGNOSIS — R92.8 ABNORMAL MAMMOGRAM: ICD-10-CM

## 2018-07-09 ENCOUNTER — TELEPHONE (OUTPATIENT)
Dept: INTERNAL MEDICINE | Age: 78
End: 2018-07-09

## 2018-07-09 NOTE — TELEPHONE ENCOUNTER
Per physician ask pharmacist to assist with getting 150 mg daily dosing once, instead of bid. Patient will call and ask pharmacist and if they are not able to assist then she will call back tomorrow.

## 2018-07-10 ENCOUNTER — TELEPHONE (OUTPATIENT)
Dept: SURGERY | Age: 78
End: 2018-07-10

## 2018-07-10 ENCOUNTER — OFFICE VISIT (OUTPATIENT)
Dept: SURGERY | Age: 78
End: 2018-07-10

## 2018-07-10 VITALS
BODY MASS INDEX: 38.61 KG/M2 | TEMPERATURE: 98.8 F | SYSTOLIC BLOOD PRESSURE: 125 MMHG | HEART RATE: 78 BPM | WEIGHT: 209.8 LBS | DIASTOLIC BLOOD PRESSURE: 88 MMHG | HEIGHT: 62 IN

## 2018-07-10 DIAGNOSIS — C50.512 MALIGNANT NEOPLASM OF LOWER-OUTER QUADRANT OF LEFT BREAST OF FEMALE, ESTROGEN RECEPTOR POSITIVE (HCC): Primary | ICD-10-CM

## 2018-07-10 DIAGNOSIS — Z17.0 MALIGNANT NEOPLASM OF LOWER-OUTER QUADRANT OF LEFT BREAST OF FEMALE, ESTROGEN RECEPTOR POSITIVE (HCC): Primary | ICD-10-CM

## 2018-07-10 PROCEDURE — G8417 CALC BMI ABV UP PARAM F/U: HCPCS | Performed by: SURGERY

## 2018-07-10 PROCEDURE — 4040F PNEUMOC VAC/ADMIN/RCVD: CPT | Performed by: SURGERY

## 2018-07-10 PROCEDURE — G8399 PT W/DXA RESULTS DOCUMENT: HCPCS | Performed by: SURGERY

## 2018-07-10 PROCEDURE — G8427 DOCREV CUR MEDS BY ELIG CLIN: HCPCS | Performed by: SURGERY

## 2018-07-10 PROCEDURE — 99215 OFFICE O/P EST HI 40 MIN: CPT | Performed by: SURGERY

## 2018-07-10 PROCEDURE — 1090F PRES/ABSN URINE INCON ASSESS: CPT | Performed by: SURGERY

## 2018-07-10 PROCEDURE — 1123F ACP DISCUSS/DSCN MKR DOCD: CPT | Performed by: SURGERY

## 2018-07-10 PROCEDURE — 1036F TOBACCO NON-USER: CPT | Performed by: SURGERY

## 2018-07-10 ASSESSMENT — ENCOUNTER SYMPTOMS
BACK PAIN: 0
SHORTNESS OF BREATH: 0
ABDOMINAL PAIN: 0
CONSTIPATION: 0
RECTAL PAIN: 0
BLOOD IN STOOL: 0
COUGH: 0
VOMITING: 0
NAUSEA: 0
TROUBLE SWALLOWING: 0
COLOR CHANGE: 0
DIARRHEA: 0
ABDOMINAL DISTENTION: 0

## 2018-07-10 NOTE — PROGRESS NOTES
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 Reviewed surgical options, including lumpectomy, mastectomy, and mastectomy with reconstruction. Also reviewed the sentinal node procedure. Recommendations were made following standard NCCN guidelines. She wishes to proceed with needle localized left breast lumpectomy and sentinel node biopsy with radioisotope for clinical T1N0 ductal carcinoma. Risks, benefits, and alternatives were reviewed with the patient. Questions were answered, and she is agreeable to proceed. She will need to hold Coumadin 5 days prior to surgery and bridge with Lovenox.

## 2018-07-10 NOTE — TELEPHONE ENCOUNTER
Breast History:  History of Previous Breast Biopsy:yes  Self Breast Exams Completed:yes-  Family History of Breast Cancer:yes-  Personal history of right breast cancer 61's    Age of Diagnosis:   Age of Death or Current Age:   Family History of Other Cancers:yes  Brother and mother history of throat cancer   Age of Diagnosis:    Age of Death or Current Age:   Ashkenazi Religious Decent:no  Bra Size: 45 B    Gyne History:  : 10,   Para: 10  Age of Menarche: 13 years  Age of Menopause: 52 years   Age of first pregnancy: 19 years  Age of first live Birth: 19 years  Breast Feeding (total time): yes  History of Hysterectomy / DINORAH-BSO: No  History of OCP's/HRT:No   When and how long:    History of Ovarian Cancer: no   What age:    Family History of Ovarian Cancer: no   Age of death or current age:   History of Gyne Surgery: no-

## 2018-07-10 NOTE — PATIENT INSTRUCTIONS
Pathology results were discussed   Exam in office today was unremarkable for masses  Surgical options were discussed lumpectomy with radiation vs mastectomy  Recommendation is to proceed with lumpectomy  Outpatient surgery with general anesthesia  Will need H&P by primary care physician for surgical clearance  Will need to stop Coumadin for 5 days prior to surgery and will need to bridge with Lovenox  Consent for surgery was signed   Risks and benefits of surgery were explained

## 2018-07-10 NOTE — LETTER
CONSENT TO OPERATION  AND/OR OTHER PROCEDURE(S)          PATIENT : Netite Dorado   YOB: 1940      DATE : 7/10/18          1. I request and consent that Dr. Deisy Quinteros,  and/or his associates or assistants perform an operation and/or procedures on the above patient at  [facility], [facility] to treat the condition(s) which appear indicated by the diagnostic studies already performed. I have been told that in general terms the nature, purpose and reasonable expectations of the operation and/or procedure(s) are:     Mammogram guided needle localized left breast lumpectomy with left sentinel node biopsy with Radioisotope and Gamma Probe      2. It has been explained to me by the informing physician that during the course of the operation and/or procedure(s) unforeseen conditions may be revealed that necessitate an extension of the original operation and/or procedure(s) or different operation and/or procedures than those set forth in Paragraph 1. I therefore authorize and request that my physician and/or his associates or assistants perform such operations and/or procedures as are necessary and desirable in the exercise of professional judgment. The authority granted under this Paragraph 2 shall extend to all conditions that require treatment and are known to my physician at the time the operation is commenced. 3. I have been made aware by the informing physician of certain risks and consequences that are associated with the operation and/or procedure(s) described in Paragraph 1, the reasonable alternative methods or treatment, the possible consequences, the possibility that the operation and/or procedure(s) may be unsuccessful and the possibility of complications. I understand the reasonably known risks to be:  ? Bleeding  ? Infection  ? Poor Healing  ? Possible Damage to Nerve, Vessel, Tendon/Muscle or Bone  ? Need for further Treatment/Surgery  ? Stiffness  ?  Pain ? Residual or Recurrent Symptoms  ? Anesthetic and/or Medical Risks  ? 4. I have also been informed by the informing physician that there are other risks from both known and unknown causes that are attendant to the performance of any surgical procedure. I am aware that the practice of medicine and surgery is not an exact science, and that no guarantees have been made to me concerning the results of the operation and/or procedure(s). 5. I   CONSENT / REFUSE CONSENT  (strike the phrase that does not apply) to the taking of photographs before, during and/or after the operation or procedure for scientific/educational purposes. 6. I consent to the administration of anesthesia and to the use of such anesthetics as may be deemed advisable by the anesthesiologist who has been engaged by me or my physician. 7. I certify that I have read and understand the above consent to operation and/or other procedure(s); that the explanations therein referred to were made to me by the informing physician in advance of my signing this consent; that all blanks or statements requiring insertion or completion were filled in and inapplicable paragraphs, if any, were stricken before I signed; and that all questions asked by me about the operation and/or procedure(s) which I have consented to have been fully answered in a satisfactory manner.               _______________________  Witness        Signature Of Patient          _______________________  Informing Physician         Signature of Informing Physician           If patient is unable to sign or is a minor, complete one of the following:    (A)  Patient is a minor   years of age. (B)  Patient is unable to sign because: The undersigned represents that he or she is duly authorized to execute this consent for and on behalf of the above named patient.                Witness               o  Parent  o  Guardian   o  Spouse       o  Other (specify)

## 2018-07-16 DIAGNOSIS — I26.99 OTHER PULMONARY EMBOLISM WITHOUT ACUTE COR PULMONALE, UNSPECIFIED CHRONICITY (HCC): ICD-10-CM

## 2018-07-16 DIAGNOSIS — M17.11 OSTEOARTHRITIS OF RIGHT KNEE, UNSPECIFIED OSTEOARTHRITIS TYPE: ICD-10-CM

## 2018-07-16 DIAGNOSIS — Z01.818 PREOP EXAMINATION: ICD-10-CM

## 2018-07-16 LAB
A/G RATIO: 0.8 (ref 1.1–2.2)
ALBUMIN SERPL-MCNC: 3.3 G/DL (ref 3.4–5)
ALP BLD-CCNC: 59 U/L (ref 40–129)
ALT SERPL-CCNC: 23 U/L (ref 10–40)
ANION GAP SERPL CALCULATED.3IONS-SCNC: 9 MMOL/L (ref 3–16)
AST SERPL-CCNC: 26 U/L (ref 15–37)
BASOPHILS ABSOLUTE: 0 K/UL (ref 0–0.2)
BASOPHILS RELATIVE PERCENT: 0.7 %
BILIRUB SERPL-MCNC: <0.2 MG/DL (ref 0–1)
BUN BLDV-MCNC: 15 MG/DL (ref 7–20)
CALCIUM SERPL-MCNC: 9.5 MG/DL (ref 8.3–10.6)
CHLORIDE BLD-SCNC: 106 MMOL/L (ref 99–110)
CO2: 26 MMOL/L (ref 21–32)
CREAT SERPL-MCNC: 1.1 MG/DL (ref 0.6–1.2)
EOSINOPHILS ABSOLUTE: 0 K/UL (ref 0–0.6)
EOSINOPHILS RELATIVE PERCENT: 1.5 %
GFR AFRICAN AMERICAN: 58
GFR NON-AFRICAN AMERICAN: 48
GLOBULIN: 4.1 G/DL
GLUCOSE BLD-MCNC: 106 MG/DL (ref 70–99)
HCT VFR BLD CALC: 37.7 % (ref 36–48)
HEMOGLOBIN: 12.7 G/DL (ref 12–16)
INR BLD: 1.2 (ref 0.86–1.14)
LYMPHOCYTES ABSOLUTE: 0.9 K/UL (ref 1–5.1)
LYMPHOCYTES RELATIVE PERCENT: 40.5 %
MCH RBC QN AUTO: 31.1 PG (ref 26–34)
MCHC RBC AUTO-ENTMCNC: 33.7 G/DL (ref 31–36)
MCV RBC AUTO: 92.3 FL (ref 80–100)
MONOCYTES ABSOLUTE: 0.3 K/UL (ref 0–1.3)
MONOCYTES RELATIVE PERCENT: 12.8 %
NEUTROPHILS ABSOLUTE: 1 K/UL (ref 1.7–7.7)
NEUTROPHILS RELATIVE PERCENT: 44.5 %
PDW BLD-RTO: 15.7 % (ref 12.4–15.4)
PLATELET # BLD: 195 K/UL (ref 135–450)
PMV BLD AUTO: 9.7 FL (ref 5–10.5)
POTASSIUM SERPL-SCNC: 4.5 MMOL/L (ref 3.5–5.1)
PROTHROMBIN TIME: 13.7 SEC (ref 9.8–13)
RBC # BLD: 4.09 M/UL (ref 4–5.2)
SODIUM BLD-SCNC: 141 MMOL/L (ref 136–145)
TOTAL PROTEIN: 7.4 G/DL (ref 6.4–8.2)
WBC # BLD: 2.2 K/UL (ref 4–11)

## 2018-07-17 ENCOUNTER — ANTI-COAG VISIT (OUTPATIENT)
Dept: INTERNAL MEDICINE | Age: 78
End: 2018-07-17

## 2018-07-23 DIAGNOSIS — Z79.01 LONG TERM CURRENT USE OF ANTICOAGULANT THERAPY: ICD-10-CM

## 2018-07-23 DIAGNOSIS — I26.99 OTHER PULMONARY EMBOLISM WITHOUT ACUTE COR PULMONALE, UNSPECIFIED CHRONICITY (HCC): ICD-10-CM

## 2018-07-23 LAB
INR BLD: 2.2 (ref 0.86–1.14)
PROTHROMBIN TIME: 25.1 SEC (ref 9.8–13)

## 2018-07-24 ENCOUNTER — ANTI-COAG VISIT (OUTPATIENT)
Dept: INTERNAL MEDICINE | Age: 78
End: 2018-07-24

## 2018-08-09 NOTE — PROGRESS NOTES
[facility] / [facility] ([facility]) [address] [facility], [address]    Acknowledgment of Informed Consent for Surgical or Medical Procedure and Sedation  I agree to allow doctor(s) BENJI PUGH and his/her associates or assistants, including residents and/or other qualified medical practitioner to perform the following medical treatment or procedure and to administer or direct the administration of sedation as necessary:  Procedure(s): MAMMOGRAM GUIDED NEEDLE LOCALIZED, LEFT BREAST LUMPECTOMY WITH LEFT BREAST SENTINEL NODE BIOPSY WITH RADIOISOTOPE AND GAMMA PROBE  My doctor has explained the following regarding the proposed procedure:   the explanation of the procedure   the benefits of the procedure   the potential problems that might occur during recuperation   the risks and side effects of the procedure which could include but are not limited to severe blood loss, infection, stroke or death   the benefits, risks and side effect of alternative procedures including the consequences of declining this procedure or any alternative procedures   the likelihood of achieving satisfactory results. I acknowledge no guarantee or assurance has been made to me regarding the results. I understand that during the course of this treatment/procedure, unforeseen conditions can occur which require an additional or different procedure. I agree to allow my physician or assistants to perform such extension of the original procedure as they may find necessary. I understand that sedation will often result in temporary impairment of memory and fine motor skills and that sedation can occasionally progress to a state of deep sedation or general anesthesia. I understand the risks of anesthesia for surgery include, but are not limited to, sore throat, hoarseness, injury to face, mouth, or teeth; nausea; headache; injury to blood vessels or nerves; death, brain damage, or paralysis.     I understand that if I

## 2018-08-17 ENCOUNTER — OFFICE VISIT (OUTPATIENT)
Dept: INTERNAL MEDICINE | Age: 78
End: 2018-08-17

## 2018-08-17 VITALS
BODY MASS INDEX: 38.64 KG/M2 | WEIGHT: 210 LBS | DIASTOLIC BLOOD PRESSURE: 84 MMHG | SYSTOLIC BLOOD PRESSURE: 130 MMHG | HEIGHT: 62 IN

## 2018-08-17 DIAGNOSIS — D70.8 CHRONIC BENIGN NEUTROPENIA (HCC): ICD-10-CM

## 2018-08-17 PROCEDURE — 1090F PRES/ABSN URINE INCON ASSESS: CPT | Performed by: INTERNAL MEDICINE

## 2018-08-17 PROCEDURE — 1123F ACP DISCUSS/DSCN MKR DOCD: CPT | Performed by: INTERNAL MEDICINE

## 2018-08-17 PROCEDURE — G8427 DOCREV CUR MEDS BY ELIG CLIN: HCPCS | Performed by: INTERNAL MEDICINE

## 2018-08-17 PROCEDURE — 4040F PNEUMOC VAC/ADMIN/RCVD: CPT | Performed by: INTERNAL MEDICINE

## 2018-08-17 PROCEDURE — G8417 CALC BMI ABV UP PARAM F/U: HCPCS | Performed by: INTERNAL MEDICINE

## 2018-08-17 PROCEDURE — G8399 PT W/DXA RESULTS DOCUMENT: HCPCS | Performed by: INTERNAL MEDICINE

## 2018-08-17 PROCEDURE — 1036F TOBACCO NON-USER: CPT | Performed by: INTERNAL MEDICINE

## 2018-08-17 PROCEDURE — 99214 OFFICE O/P EST MOD 30 MIN: CPT | Performed by: INTERNAL MEDICINE

## 2018-08-17 PROCEDURE — 1101F PT FALLS ASSESS-DOCD LE1/YR: CPT | Performed by: INTERNAL MEDICINE

## 2018-08-21 ENCOUNTER — TELEPHONE (OUTPATIENT)
Dept: SURGERY | Age: 78
End: 2018-08-21

## 2018-08-22 ENCOUNTER — TELEPHONE (OUTPATIENT)
Dept: SURGERY | Age: 78
End: 2018-08-22

## 2018-08-22 NOTE — TELEPHONE ENCOUNTER
Spoke with patient regarding surgery Mammogram-guided Needle-localized Left Breast Lumpectomy with Left Breast SNB scheduled on 08- with Dr. Eliseo Ya. Patient is asked to arrive by 6:30 AM @ [facility].  NPO after midnight. May take any Heart or Blood Pressure medication with a small sip of water to wash them down. Patient states she has stopped her Coumadin. You will need someone to drive you home following this procedure, and to stay with you for the remainder of the day, due to the anesthesia. Please remember to bring a Photo ID & Insurance Card with you. Enter at [employer] on the [address] [place] [address] Street side. Check-in at the desk. Patient has seen PCP for Pre-op H & P on 8-. Patient expressed a verbal understanding of these instructions and had no further questions at this time. Mailed instruction letter. Call ended.

## 2018-08-23 ENCOUNTER — ANESTHESIA EVENT (OUTPATIENT)
Dept: OPERATING ROOM | Age: 78
End: 2018-08-23
Payer: MEDICARE

## 2018-08-23 RX ORDER — DORZOLAMIDE HCL 20 MG/ML
2 SOLUTION/ DROPS OPHTHALMIC 3 TIMES DAILY
COMMUNITY

## 2018-08-23 NOTE — PROGRESS NOTES
you may need brought in by your family after your arrival to your hospital room. 15. If you have a Living Will or Durable Power of , please bring a copy on the day of your procedure. 15. With your permission, one family member may accompany you while you are being prepared for surgery. Once you are ready, additional family members may join you. HOW WE KEEP YOU SAFE and WORK TO PREVENT SURGICAL SITE INFECTIONS:  1. Health care workers should always check your ID bracelet to verify your name and birth date. You will be asked many times to state your name, date of birth, and allergies. 2. Health care workers should always clean their hands with soap or alcohol gel before providing care to you. It is okay to ask anyone if they cleaned their hands before they touch you. 3. You will be actively involved in verifying the type of procedure you are having and ensuring the correct surgical site. This will be confirmed multiple times prior to your procedure. Do NOT micaela your surgery site UNLESS instructed to by your surgeon. 4. Do not shave or wax for 72 hours prior to procedure near your operative site. Shaving with a razor can irritate your skin and make it easier to develop an infection. On the day of your procedure, any hair that needs to be removed near the surgical site will be clipped by a healthcare worker using a special clippers designed to avoid skin irritation. 5. When you are in the operating room, your surgical site will be cleansed with a special soap, and in most cases, you will be given an antibiotic before the surgery begins. What to expect AFTER YOUR PROCEDURE:  1. Immediately following your procedure, your will be taken to the PACU for the first phase of your recovery. Your nurse will help you recover from any potential side effects of anesthesia, such as extreme drowsiness, changes in your vital signs or breathing patterns.  Nausea, headache, muscle aches, or sore throat may also occur after anesthesia. Your nurse will help you manage these potential side effects. 2. For comfort and safety, arrange to have someone at home with you for the first 24 hours after discharge. 3. You and your family will be given written instructions about your diet, activity, dressing care, medications, and return visits. 4. Once at home, should issues with nausea, pain, or bleeding occur, or should you notice any signs of infection, you should call your surgeon. 5. Always clean your hands before and after caring for your wound. Do not let your family touch your surgery site without cleaning their hands. 6. Narcotic pain medications can cause significant constipation. You may want to add a stool softener to your postoperative medication schedule or speak to your surgeon on how best to manage this SIDE EFFECT. SPECIAL INSTRUCTIONS     Thank you for allowing us to care for you. We strive to exceed your expectations in the delivery of care and service provided to you and your family. If you need to contact us for any reason, please call us at [phone]    Instructions reviewed with patient during preadmission testing phone interview. Breann Goodman. 8/23/2018 .9:10 AM      ADDITIONAL EDUCATIONAL INFORMATION REVIEWED PER PHONE WITH YOU AND/OR YOUR FAMILY:  No Bring a urine sample on day of surgery  Yes Pain Goal-Taking Control of Your Pain  Yes FAQs about Surgical Site Infections  No Hibiclens® Bathing Instructions   Yes Antibacterial Soap  No Praneeth® Wipes Bathing Instructions (Obtained from: https://www.[employer]/. pdf )  No Incentive Spirometer Education  No Other

## 2018-08-24 ENCOUNTER — HOSPITAL ENCOUNTER (OUTPATIENT)
Dept: MAMMOGRAPHY | Age: 78
Discharge: HOME | End: 2018-08-24
Payer: MEDICARE

## 2018-08-24 ENCOUNTER — APPOINTMENT (OUTPATIENT)
Dept: MAMMOGRAPHY | Age: 78
End: 2018-08-24
Attending: SURGERY
Payer: MEDICARE

## 2018-08-24 ENCOUNTER — ANESTHESIA (OUTPATIENT)
Dept: OPERATING ROOM | Age: 78
End: 2018-08-24
Payer: MEDICARE

## 2018-08-24 ENCOUNTER — HOSPITAL ENCOUNTER (OUTPATIENT)
Age: 78
Setting detail: OUTPATIENT SURGERY
Discharge: HOME | End: 2018-08-24
Attending: SURGERY | Admitting: SURGERY
Payer: MEDICARE

## 2018-08-24 ENCOUNTER — HOSPITAL ENCOUNTER (OUTPATIENT)
Dept: NUCLEAR MEDICINE | Age: 78
Discharge: HOME | End: 2018-08-24
Payer: MEDICARE

## 2018-08-24 VITALS
HEART RATE: 63 BPM | TEMPERATURE: 97.2 F | OXYGEN SATURATION: 94 % | RESPIRATION RATE: 16 BRPM | HEIGHT: 62 IN | SYSTOLIC BLOOD PRESSURE: 135 MMHG | BODY MASS INDEX: 38.64 KG/M2 | DIASTOLIC BLOOD PRESSURE: 92 MMHG | WEIGHT: 210 LBS

## 2018-08-24 VITALS — DIASTOLIC BLOOD PRESSURE: 80 MMHG | OXYGEN SATURATION: 100 % | TEMPERATURE: 95 F | SYSTOLIC BLOOD PRESSURE: 117 MMHG

## 2018-08-24 DIAGNOSIS — C50.912 MALIGNANT NEOPLASM OF LEFT FEMALE BREAST, UNSPECIFIED ESTROGEN RECEPTOR STATUS, UNSPECIFIED SITE OF BREAST (HCC): ICD-10-CM

## 2018-08-24 DIAGNOSIS — Z17.0 MALIGNANT NEOPLASM OF LOWER-OUTER QUADRANT OF LEFT BREAST OF FEMALE, ESTROGEN RECEPTOR POSITIVE (HCC): Primary | ICD-10-CM

## 2018-08-24 DIAGNOSIS — C50.512 MALIGNANT NEOPLASM OF LOWER-OUTER QUADRANT OF LEFT BREAST OF FEMALE, ESTROGEN RECEPTOR POSITIVE (HCC): Primary | ICD-10-CM

## 2018-08-24 DIAGNOSIS — R92.8 ABNORMAL MAMMOGRAM: ICD-10-CM

## 2018-08-24 PROCEDURE — 2500000003 HC RX 250 WO HCPCS: Performed by: SURGERY

## 2018-08-24 PROCEDURE — 2580000003 HC RX 258: Performed by: ANESTHESIOLOGY

## 2018-08-24 PROCEDURE — 3600000002 HC SURGERY LEVEL 2 BASE: Performed by: SURGERY

## 2018-08-24 PROCEDURE — S0028 INJECTION, FAMOTIDINE, 20 MG: HCPCS | Performed by: ANESTHESIOLOGY

## 2018-08-24 PROCEDURE — 38900 IO MAP OF SENT LYMPH NODE: CPT | Performed by: SURGERY

## 2018-08-24 PROCEDURE — 2580000003 HC RX 258: Performed by: SURGERY

## 2018-08-24 PROCEDURE — A9541 TC99M SULFUR COLLOID: HCPCS | Performed by: SURGERY

## 2018-08-24 PROCEDURE — 7100000010 HC PHASE II RECOVERY - FIRST 15 MIN: Performed by: SURGERY

## 2018-08-24 PROCEDURE — 6370000000 HC RX 637 (ALT 250 FOR IP): Performed by: SURGERY

## 2018-08-24 PROCEDURE — 19281 PERQ DEVICE BREAST 1ST IMAG: CPT

## 2018-08-24 PROCEDURE — 88305 TISSUE EXAM BY PATHOLOGIST: CPT

## 2018-08-24 PROCEDURE — 88342 IMHCHEM/IMCYTCHM 1ST ANTB: CPT

## 2018-08-24 PROCEDURE — 2709999900 HC NON-CHARGEABLE SUPPLY: Performed by: SURGERY

## 2018-08-24 PROCEDURE — 38792 RA TRACER ID OF SENTINL NODE: CPT

## 2018-08-24 PROCEDURE — 2500000003 HC RX 250 WO HCPCS: Performed by: ANESTHESIOLOGY

## 2018-08-24 PROCEDURE — 3700000001 HC ADD 15 MINUTES (ANESTHESIA): Performed by: SURGERY

## 2018-08-24 PROCEDURE — 2500000003 HC RX 250 WO HCPCS

## 2018-08-24 PROCEDURE — 6360000002 HC RX W HCPCS

## 2018-08-24 PROCEDURE — 6360000002 HC RX W HCPCS: Performed by: SURGERY

## 2018-08-24 PROCEDURE — 7100000011 HC PHASE II RECOVERY - ADDTL 15 MIN: Performed by: SURGERY

## 2018-08-24 PROCEDURE — 38525 BIOPSY/REMOVAL LYMPH NODES: CPT | Performed by: SURGERY

## 2018-08-24 PROCEDURE — 3600000012 HC SURGERY LEVEL 2 ADDTL 15MIN: Performed by: SURGERY

## 2018-08-24 PROCEDURE — 7100000000 HC PACU RECOVERY - FIRST 15 MIN: Performed by: SURGERY

## 2018-08-24 PROCEDURE — 19301 PARTIAL MASTECTOMY: CPT | Performed by: SURGERY

## 2018-08-24 PROCEDURE — 3700000000 HC ANESTHESIA ATTENDED CARE: Performed by: SURGERY

## 2018-08-24 PROCEDURE — 3430000000 HC RX DIAGNOSTIC RADIOPHARMACEUTICAL: Performed by: SURGERY

## 2018-08-24 PROCEDURE — 88307 TISSUE EXAM BY PATHOLOGIST: CPT

## 2018-08-24 PROCEDURE — 6360000002 HC RX W HCPCS: Performed by: ANESTHESIOLOGY

## 2018-08-24 PROCEDURE — 7100000001 HC PACU RECOVERY - ADDTL 15 MIN: Performed by: SURGERY

## 2018-08-24 RX ORDER — HYDRALAZINE HYDROCHLORIDE 20 MG/ML
5 INJECTION INTRAMUSCULAR; INTRAVENOUS EVERY 5 MIN PRN
Status: CANCELLED | OUTPATIENT
Start: 2018-08-24

## 2018-08-24 RX ORDER — MIDAZOLAM HYDROCHLORIDE 1 MG/ML
INJECTION INTRAMUSCULAR; INTRAVENOUS PRN
Status: DISCONTINUED | OUTPATIENT
Start: 2018-08-24 | End: 2018-08-24 | Stop reason: SDUPTHER

## 2018-08-24 RX ORDER — OXYCODONE HYDROCHLORIDE AND ACETAMINOPHEN 5; 325 MG/1; MG/1
1 TABLET ORAL EVERY 6 HOURS PRN
Qty: 10 TABLET | Refills: 0 | Status: SHIPPED | OUTPATIENT
Start: 2018-08-24 | End: 2018-08-27

## 2018-08-24 RX ORDER — SODIUM CHLORIDE, SODIUM LACTATE, POTASSIUM CHLORIDE, CALCIUM CHLORIDE 600; 310; 30; 20 MG/100ML; MG/100ML; MG/100ML; MG/100ML
INJECTION, SOLUTION INTRAVENOUS CONTINUOUS
Status: DISCONTINUED | OUTPATIENT
Start: 2018-08-24 | End: 2018-08-24 | Stop reason: HOSPADM

## 2018-08-24 RX ORDER — SODIUM CHLORIDE 0.9 % (FLUSH) 0.9 %
10 SYRINGE (ML) INJECTION EVERY 12 HOURS SCHEDULED
Status: DISCONTINUED | OUTPATIENT
Start: 2018-08-24 | End: 2018-08-24 | Stop reason: HOSPADM

## 2018-08-24 RX ORDER — MORPHINE SULFATE 2 MG/ML
2 INJECTION, SOLUTION INTRAMUSCULAR; INTRAVENOUS EVERY 5 MIN PRN
Status: CANCELLED | OUTPATIENT
Start: 2018-08-24

## 2018-08-24 RX ORDER — LIDOCAINE HYDROCHLORIDE 10 MG/ML
1 INJECTION, SOLUTION EPIDURAL; INFILTRATION; INTRACAUDAL; PERINEURAL
Status: DISCONTINUED | OUTPATIENT
Start: 2018-08-24 | End: 2018-08-24 | Stop reason: HOSPADM

## 2018-08-24 RX ORDER — FENTANYL CITRATE 50 UG/ML
25 INJECTION, SOLUTION INTRAMUSCULAR; INTRAVENOUS EVERY 5 MIN PRN
Status: CANCELLED | OUTPATIENT
Start: 2018-08-24

## 2018-08-24 RX ORDER — LIDOCAINE HYDROCHLORIDE AND EPINEPHRINE 10; 10 MG/ML; UG/ML
INJECTION, SOLUTION INFILTRATION; PERINEURAL PRN
Status: DISCONTINUED | OUTPATIENT
Start: 2018-08-24 | End: 2018-08-24 | Stop reason: HOSPADM

## 2018-08-24 RX ORDER — ENALAPRILAT 2.5 MG/2ML
1.25 INJECTION INTRAVENOUS
Status: CANCELLED | OUTPATIENT
Start: 2018-08-24 | End: 2018-08-24

## 2018-08-24 RX ORDER — PROPOFOL 10 MG/ML
INJECTION, EMULSION INTRAVENOUS PRN
Status: DISCONTINUED | OUTPATIENT
Start: 2018-08-24 | End: 2018-08-24 | Stop reason: SDUPTHER

## 2018-08-24 RX ORDER — DEXAMETHASONE SODIUM PHOSPHATE 4 MG/ML
4 INJECTION, SOLUTION INTRA-ARTICULAR; INTRALESIONAL; INTRAMUSCULAR; INTRAVENOUS; SOFT TISSUE ONCE
Status: COMPLETED | OUTPATIENT
Start: 2018-08-24 | End: 2018-08-24

## 2018-08-24 RX ORDER — ACETAMINOPHEN 325 MG/1
650 TABLET ORAL
Status: COMPLETED | OUTPATIENT
Start: 2018-08-24 | End: 2018-08-24

## 2018-08-24 RX ORDER — GLYCOPYRROLATE 0.2 MG/ML
0.2 INJECTION INTRAMUSCULAR; INTRAVENOUS ONCE
Status: COMPLETED | OUTPATIENT
Start: 2018-08-24 | End: 2018-08-24

## 2018-08-24 RX ORDER — EPHEDRINE SULFATE 50 MG/ML
INJECTION, SOLUTION INTRAVENOUS PRN
Status: DISCONTINUED | OUTPATIENT
Start: 2018-08-24 | End: 2018-08-24 | Stop reason: SDUPTHER

## 2018-08-24 RX ORDER — FENTANYL CITRATE 50 UG/ML
INJECTION, SOLUTION INTRAMUSCULAR; INTRAVENOUS PRN
Status: DISCONTINUED | OUTPATIENT
Start: 2018-08-24 | End: 2018-08-24 | Stop reason: SDUPTHER

## 2018-08-24 RX ORDER — LIDOCAINE HYDROCHLORIDE 20 MG/ML
INJECTION, SOLUTION EPIDURAL; INFILTRATION; INTRACAUDAL; PERINEURAL PRN
Status: DISCONTINUED | OUTPATIENT
Start: 2018-08-24 | End: 2018-08-24 | Stop reason: SDUPTHER

## 2018-08-24 RX ORDER — MAGNESIUM HYDROXIDE 1200 MG/15ML
LIQUID ORAL CONTINUOUS PRN
Status: DISCONTINUED | OUTPATIENT
Start: 2018-08-24 | End: 2018-08-24 | Stop reason: HOSPADM

## 2018-08-24 RX ORDER — LIDOCAINE AND PRILOCAINE 25; 25 MG/G; MG/G
CREAM TOPICAL ONCE
Status: COMPLETED | OUTPATIENT
Start: 2018-08-24 | End: 2018-08-24

## 2018-08-24 RX ORDER — LABETALOL HYDROCHLORIDE 5 MG/ML
5 INJECTION, SOLUTION INTRAVENOUS EVERY 10 MIN PRN
Status: CANCELLED | OUTPATIENT
Start: 2018-08-24

## 2018-08-24 RX ORDER — ONDANSETRON 2 MG/ML
4 INJECTION INTRAMUSCULAR; INTRAVENOUS ONCE
Status: COMPLETED | OUTPATIENT
Start: 2018-08-24 | End: 2018-08-24

## 2018-08-24 RX ORDER — SODIUM CHLORIDE 0.9 % (FLUSH) 0.9 %
10 SYRINGE (ML) INJECTION PRN
Status: DISCONTINUED | OUTPATIENT
Start: 2018-08-24 | End: 2018-08-24 | Stop reason: HOSPADM

## 2018-08-24 RX ORDER — ONDANSETRON 2 MG/ML
4 INJECTION INTRAMUSCULAR; INTRAVENOUS
Status: CANCELLED | OUTPATIENT
Start: 2018-08-24 | End: 2018-08-24

## 2018-08-24 RX ORDER — LIDOCAINE HYDROCHLORIDE 10 MG/ML
10 INJECTION, SOLUTION EPIDURAL; INFILTRATION; INTRACAUDAL; PERINEURAL ONCE
Status: COMPLETED | OUTPATIENT
Start: 2018-08-24 | End: 2018-08-24

## 2018-08-24 RX ADMIN — LIDOCAINE AND PRILOCAINE: 25; 25 CREAM TOPICAL at 06:45

## 2018-08-24 RX ADMIN — FENTANYL CITRATE 50 MCG: 50 INJECTION INTRAMUSCULAR; INTRAVENOUS at 10:20

## 2018-08-24 RX ADMIN — DEXAMETHASONE SODIUM PHOSPHATE 4 MG: 4 INJECTION, SOLUTION INTRAMUSCULAR; INTRAVENOUS at 08:45

## 2018-08-24 RX ADMIN — ACETAMINOPHEN 650 MG: 325 TABLET ORAL at 08:44

## 2018-08-24 RX ADMIN — EPHEDRINE SULFATE 5 MG: 50 INJECTION, SOLUTION INTRAMUSCULAR; INTRAVENOUS; SUBCUTANEOUS at 10:30

## 2018-08-24 RX ADMIN — FAMOTIDINE 20 MG: 10 INJECTION, SOLUTION INTRAVENOUS at 08:45

## 2018-08-24 RX ADMIN — SODIUM CHLORIDE, SODIUM LACTATE, POTASSIUM CHLORIDE, AND CALCIUM CHLORIDE: 600; 310; 30; 20 INJECTION, SOLUTION INTRAVENOUS at 11:30

## 2018-08-24 RX ADMIN — MIDAZOLAM HYDROCHLORIDE 2 MG: 1 INJECTION INTRAMUSCULAR; INTRAVENOUS at 10:15

## 2018-08-24 RX ADMIN — FENTANYL CITRATE 50 MCG: 50 INJECTION INTRAMUSCULAR; INTRAVENOUS at 10:40

## 2018-08-24 RX ADMIN — PROPOFOL 180 MG: 10 INJECTION, EMULSION INTRAVENOUS at 10:20

## 2018-08-24 RX ADMIN — GLYCOPYRROLATE 0.2 MG: 0.2 INJECTION, SOLUTION INTRAMUSCULAR; INTRAVENOUS at 08:45

## 2018-08-24 RX ADMIN — LIDOCAINE HYDROCHLORIDE 3 ML: 10 INJECTION, SOLUTION EPIDURAL; INFILTRATION; INTRACAUDAL; PERINEURAL at 08:47

## 2018-08-24 RX ADMIN — SODIUM CHLORIDE, SODIUM LACTATE, POTASSIUM CHLORIDE, AND CALCIUM CHLORIDE: 600; 310; 30; 20 INJECTION, SOLUTION INTRAVENOUS at 08:45

## 2018-08-24 RX ADMIN — Medication 800 MICRO CURIE: at 08:20

## 2018-08-24 RX ADMIN — EPHEDRINE SULFATE 5 MG: 50 INJECTION, SOLUTION INTRAMUSCULAR; INTRAVENOUS; SUBCUTANEOUS at 10:36

## 2018-08-24 RX ADMIN — Medication 2 G: at 10:15

## 2018-08-24 RX ADMIN — LIDOCAINE HYDROCHLORIDE 50 MG: 20 INJECTION, SOLUTION EPIDURAL; INFILTRATION; INTRACAUDAL; PERINEURAL at 10:20

## 2018-08-24 RX ADMIN — ONDANSETRON HYDROCHLORIDE 4 MG: 2 INJECTION, SOLUTION INTRAMUSCULAR; INTRAVENOUS at 08:45

## 2018-08-24 ASSESSMENT — PULMONARY FUNCTION TESTS
PIF_VALUE: 16
PIF_VALUE: 14
PIF_VALUE: 14
PIF_VALUE: 1
PIF_VALUE: 4
PIF_VALUE: 14
PIF_VALUE: 14
PIF_VALUE: 18
PIF_VALUE: 1
PIF_VALUE: 19
PIF_VALUE: 17
PIF_VALUE: 14
PIF_VALUE: 1
PIF_VALUE: 14
PIF_VALUE: 16
PIF_VALUE: 14
PIF_VALUE: 14
PIF_VALUE: 0
PIF_VALUE: 14
PIF_VALUE: 1
PIF_VALUE: 14
PIF_VALUE: 14
PIF_VALUE: 4
PIF_VALUE: 14
PIF_VALUE: 1
PIF_VALUE: 28
PIF_VALUE: 14
PIF_VALUE: 1
PIF_VALUE: 14
PIF_VALUE: 17
PIF_VALUE: 31
PIF_VALUE: 17
PIF_VALUE: 16
PIF_VALUE: 17
PIF_VALUE: 14
PIF_VALUE: 16
PIF_VALUE: 1
PIF_VALUE: 18
PIF_VALUE: 14
PIF_VALUE: 1
PIF_VALUE: 14
PIF_VALUE: 3
PIF_VALUE: 14
PIF_VALUE: 15
PIF_VALUE: 15
PIF_VALUE: 16
PIF_VALUE: 14
PIF_VALUE: 26
PIF_VALUE: 2
PIF_VALUE: 14
PIF_VALUE: 10
PIF_VALUE: 1
PIF_VALUE: 14
PIF_VALUE: 16
PIF_VALUE: 14
PIF_VALUE: 14
PIF_VALUE: 1

## 2018-08-24 ASSESSMENT — PAIN SCALES - GENERAL
PAINLEVEL_OUTOF10: 0

## 2018-08-24 ASSESSMENT — PAIN - FUNCTIONAL ASSESSMENT
PAIN_FUNCTIONAL_ASSESSMENT: 0-10
PAIN_FUNCTIONAL_ASSESSMENT: 0-10

## 2018-08-24 NOTE — PROGRESS NOTES
Ambulatory Surgery/Procedure Discharge Note  Pt alert and stable  Up to bathroom ,voided large amt in toilet  Ice to operative area  Suture lines clean dry and intact  Verbal and written discharge instructions given to pt and son  No nausea   Minimal pain, tolerable for discharge  Pt discharged per wheelchair to car with son present    Vitals:    08/24/18 1339   BP: (!) 135/92   Pulse: 63   Resp: 16   Temp: 97.2 °F (36.2 °C)   SpO2: 94%       In: 1339 [P.O.:200; I.V.:1139]  Out: 600 [Urine:600]    Pain assessment:  level of pain (1-10, 10 severe),   Pain Level: 0    Patient discharged to home/self care. Patient discharged via wheel chair by transporter to waiting family/S.O.       8/24/2018 2:06 PM  [address] may be drowsy or lightheaded after receiving sedation or anesthesia. Do not operate any vehicles (automobiles, bicycles, motorcycles) or power tools or machinery for 24 hours. Do not sign any legal documents or make any legal decisions for 24 hours. Do not drink alcohol for 24 hours or while taking narcotic pain medication. A responsible person should be with you for the next 24 hours. Please follow the instructions checked below:      DIET INSTRUCTIONS:  [x]Start with light diet and progress to your normal diet as you feel like eating. If you experience nausea or repeated episodes of vomiting which persist beyond 12-24 hours, notify your doctor. MEDICATION INSTRUCTIONS:  []Prescription(S) x     sent with you. Use as directed. When taking pain medications, you may experience the side effect of dizziness or drowsiness. Do not drink alcohol or drive when taking these medications. []Prescription(S) x          Called to Pharmacy Name and location:    [x]Give the list of your medications to your primary care physician on your next visit. Keep your med list updated and carry it with in case of emergencies.     [] Narcotic pain medications

## 2018-08-24 NOTE — ANESTHESIA POSTPROCEDURE EVALUATION
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        Anesthesia Post Evaluation

## 2018-08-24 NOTE — ANESTHESIA POSTPROCEDURE EVALUATION
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        Anesthesia Post Evaluation    Patient location during evaluation: PACU  Patient participation: complete - patient participated  Level of consciousness: awake  Airway patency: patent  Nausea & Vomiting: no nausea and no vomiting  Complications: no  Cardiovascular status: hemodynamically stable  Respiratory status: acceptable  Hydration status: euvolemic

## 2018-08-24 NOTE — H&P
H&P Update    Patient's History and Physical from August 17, 2018 was reviewed. Patient examined. There has been no change.     Nikky Singer

## 2018-08-24 NOTE — ANESTHESIA PRE PROCEDURE
Department of Anesthesiology  Preprocedure Note       Name:  Jose Juan Cannon   Age:  [de-identified] y.o.  :  1940                                          MRN:  [id number]         Date:  2018      Surgeon: Ramón Bynum): Audrey Campoverde MD    Procedure: Procedure(s):  MAMMOGRAM GUIDED NEEDLE LOCALIZED, LEFT BREAST LUMPECTOMY WITH LEFT BREAST SENTINEL NODE BIOPSY WITH RADIOISOTOPE AND GAMMA PROBE    Medications prior to admission:   Prior to Admission medications    Medication Sig Start Date End Date Taking? Authorizing Provider   brimonidine (ALPHAGAN P) 0.1 % SOLN Place 1 drop into the right eye every 8 hours   Yes Historical Provider, MD   dorzolamide (TRUSOPT) 2 % ophthalmic solution Place 2 drops into both eyes 3 times daily   Yes Historical Provider, MD   enoxaparin (LOVENOX) 150 MG/ML injection Inject 1 ml daily. 18  Yes Ke Caputo MD   enoxaparin (LOVENOX) 100 MG/ML injection Inject 0.9 mLs into the skin 2 times daily Start 3 days before procedure, hold the day of procedure, resume 48 hours after procedure and continue for 5 days. 18  Yes Ke Caputo MD   amLODIPine (NORVASC) 5 MG tablet TAKE ONE TABLET BY MOUTH DAILY 18  Yes Ke Caputo MD   warfarin (COUMADIN) 5 MG tablet TAKE ONE AND ONE-HALF (1 & 1/2) TABLET BY MOUTH DAILY FOR 5 DAYS PER WEEK THEN ONE TABLET BY MOUTH DAILY FOR THE OTHER 2 DAYS PER WEEK 18  Yes Ke Caputo MD   docusate sodium (COLACE) 100 MG capsule Take 1 capsule by mouth 2 times daily  Patient taking differently: Take 100 mg by mouth 2 times daily as needed  2/15/17  Yes CHAIM Sheth CNP   acetaminophen (TYLENOL) 650 MG extended release tablet Take 2 tablets by mouth every 8 hours as needed for Pain 2/15/17  Yes CHAIM Sheth CNP   magnesium hydroxide (MILK OF MAGNESIA CONCENTRATE) 2400 MG/10ML SUSP Take 2,400 mg by mouth once as needed   Yes Historical Provider, MD   ascorbic acid (VITAMIN C) 500 MG tablet Take 500 mg by mouth daily. 58 07/16/2018    GFRAA 56 02/08/2013    AGRATIO 0.8 07/16/2018    LABGLOM 48 07/16/2018    GLUCOSE 106 07/16/2018    GLUCOSE 98 05/18/2011    PROT 7.4 07/16/2018    PROT 8.2 02/08/2013    CALCIUM 9.5 07/16/2018    BILITOT <0.2 07/16/2018    ALKPHOS 59 07/16/2018    AST 26 07/16/2018    ALT 23 07/16/2018       POC Tests: No results for input(s): POCGLU, POCNA, POCK, POCCL, POCBUN, POCHEMO, POCHCT in the last 72 hours. Coags:   Lab Results   Component Value Date    PROTIME 25.1 07/23/2018    PROTIME 24.4 06/01/2011    INR 2.20 07/23/2018    APTT 32.5 06/06/2017       HCG (If Applicable): No results found for: PREGTESTUR, PREGSERUM, HCG, HCGQUANT     ABGs: No results found for: PHART, PO2ART, SIP4WPL, QYM2BHU, BEART, V6KOUBEO     Type & Screen (If Applicable):  No results found for: [facility]    Anesthesia Evaluation  Patient summary reviewed no history of anesthetic complications:   Airway: Mallampati: III  TM distance: >3 FB   Neck ROM: full  Mouth opening: > = 3 FB Dental: normal exam         Pulmonary:                             ROS comment: Hx PE   Cardiovascular:    (+) hypertension:,                   Neuro/Psych:               GI/Hepatic/Renal:   (+) GERD:,           Endo/Other:    (+) : arthritis:., .                  ROS comment: Hx blood clots, breast Ca  Bilateral  Abdominal:           Vascular:                                        Anesthesia Plan      general     ASA 3       Induction: intravenous. Anesthetic plan and risks discussed with patient.                       Christiana Best MD   8/24/2018

## 2018-08-24 NOTE — OP NOTE
tissues, and the fascia was divided. The gamma probe was used to identify a sentinel lymph node within the axillary region. This node was removed and vessels and lymphatics were clipped and divided. This node had a 10 second count of 934 and was sent to pathology as axillary sentinel lymph node. The residual gamma probe activity within the axillary region was minimal. There were no other enlarged lymph nodes palpated within the axillary region. Hemostasis was assured. The wounds were injected with local anesthetic, and both wounds were closed with 2 layers of vicryl, and skin was re-approximated with 4-0 Monocryl and covered with surgical glue. Sponge, needle and instrument counts were correct per nursing. The patient tolerated the procedure well. She was extubated and taken to the  recovery room in stable condition.         Electronically signed by Lavern Lazar MD on 8/24/2018 at 11:21 AM

## 2018-08-31 ENCOUNTER — TELEPHONE (OUTPATIENT)
Dept: SURGERY | Age: 78
End: 2018-08-31

## 2018-09-04 ENCOUNTER — OFFICE VISIT (OUTPATIENT)
Dept: SURGERY | Age: 78
End: 2018-09-04

## 2018-09-04 VITALS
SYSTOLIC BLOOD PRESSURE: 143 MMHG | TEMPERATURE: 98.3 F | DIASTOLIC BLOOD PRESSURE: 83 MMHG | HEART RATE: 89 BPM | RESPIRATION RATE: 16 BRPM | HEIGHT: 62 IN | BODY MASS INDEX: 38.64 KG/M2 | WEIGHT: 210 LBS

## 2018-09-04 DIAGNOSIS — Z17.0 MALIGNANT NEOPLASM OF LOWER-OUTER QUADRANT OF LEFT BREAST OF FEMALE, ESTROGEN RECEPTOR POSITIVE (HCC): Primary | ICD-10-CM

## 2018-09-04 DIAGNOSIS — C50.512 MALIGNANT NEOPLASM OF LOWER-OUTER QUADRANT OF LEFT BREAST OF FEMALE, ESTROGEN RECEPTOR POSITIVE (HCC): Primary | ICD-10-CM

## 2018-09-04 PROCEDURE — 99024 POSTOP FOLLOW-UP VISIT: CPT | Performed by: SURGERY

## 2018-09-04 NOTE — PROGRESS NOTES
Chief Complaint   Patient presents with    Follow-up     left brest lumpectomy 8/24/18      Path showed left breast invasive carcinoma, with DCIS 1 mm from margin, node negative (T1bN0). Wounds are healing well, no signs of infection. Mild incisional pain. We discussed the path results and the consideration of taking more margin, but she does not wish to do this. Instructed on wound care, f/u with me 3-4 months, or sooner if needed.

## 2018-09-07 DIAGNOSIS — I26.99 OTHER PULMONARY EMBOLISM WITHOUT ACUTE COR PULMONALE, UNSPECIFIED CHRONICITY (HCC): ICD-10-CM

## 2018-09-07 RX ORDER — WARFARIN SODIUM 5 MG/1
TABLET ORAL
Qty: 100 TABLET | Refills: 0 | Status: SHIPPED | OUTPATIENT
Start: 2018-09-07 | End: 2018-12-05 | Stop reason: SDUPTHER

## 2018-10-01 ENCOUNTER — OFFICE VISIT (OUTPATIENT)
Dept: INTERNAL MEDICINE CLINIC | Age: 78
End: 2018-10-01
Payer: MEDICARE

## 2018-10-01 VITALS
DIASTOLIC BLOOD PRESSURE: 84 MMHG | WEIGHT: 213 LBS | SYSTOLIC BLOOD PRESSURE: 140 MMHG | BODY MASS INDEX: 39.2 KG/M2 | HEIGHT: 62 IN

## 2018-10-01 DIAGNOSIS — Z17.0 MALIGNANT NEOPLASM OF LOWER-OUTER QUADRANT OF LEFT BREAST OF FEMALE, ESTROGEN RECEPTOR POSITIVE (HCC): ICD-10-CM

## 2018-10-01 DIAGNOSIS — Z23 NEED FOR INFLUENZA VACCINATION: Primary | ICD-10-CM

## 2018-10-01 DIAGNOSIS — I10 ESSENTIAL HYPERTENSION, BENIGN: ICD-10-CM

## 2018-10-01 DIAGNOSIS — K21.9 GASTROESOPHAGEAL REFLUX DISEASE WITHOUT ESOPHAGITIS: ICD-10-CM

## 2018-10-01 DIAGNOSIS — C50.512 MALIGNANT NEOPLASM OF LOWER-OUTER QUADRANT OF LEFT BREAST OF FEMALE, ESTROGEN RECEPTOR POSITIVE (HCC): ICD-10-CM

## 2018-10-01 PROCEDURE — 4040F PNEUMOC VAC/ADMIN/RCVD: CPT | Performed by: INTERNAL MEDICINE

## 2018-10-01 PROCEDURE — 90662 IIV NO PRSV INCREASED AG IM: CPT | Performed by: INTERNAL MEDICINE

## 2018-10-01 PROCEDURE — G8399 PT W/DXA RESULTS DOCUMENT: HCPCS | Performed by: INTERNAL MEDICINE

## 2018-10-01 PROCEDURE — G8417 CALC BMI ABV UP PARAM F/U: HCPCS | Performed by: INTERNAL MEDICINE

## 2018-10-01 PROCEDURE — G8427 DOCREV CUR MEDS BY ELIG CLIN: HCPCS | Performed by: INTERNAL MEDICINE

## 2018-10-01 PROCEDURE — G8482 FLU IMMUNIZE ORDER/ADMIN: HCPCS | Performed by: INTERNAL MEDICINE

## 2018-10-01 PROCEDURE — 1036F TOBACCO NON-USER: CPT | Performed by: INTERNAL MEDICINE

## 2018-10-01 PROCEDURE — G0008 ADMIN INFLUENZA VIRUS VAC: HCPCS | Performed by: INTERNAL MEDICINE

## 2018-10-01 PROCEDURE — 99213 OFFICE O/P EST LOW 20 MIN: CPT | Performed by: INTERNAL MEDICINE

## 2018-10-01 PROCEDURE — 1123F ACP DISCUSS/DSCN MKR DOCD: CPT | Performed by: INTERNAL MEDICINE

## 2018-10-01 PROCEDURE — 1101F PT FALLS ASSESS-DOCD LE1/YR: CPT | Performed by: INTERNAL MEDICINE

## 2018-10-01 PROCEDURE — 1090F PRES/ABSN URINE INCON ASSESS: CPT | Performed by: INTERNAL MEDICINE

## 2018-10-01 NOTE — PROGRESS NOTES
Vaccine Information Sheet, \"Influenza - Inactivated\"  given to Vu Leger, or parent/legal guardian of  Vu Leger and verbalized understanding. Patient responses:    Have you ever had a reaction to a flu vaccine? No  Are you able to eat eggs without adverse effects? Yes  Do you have any current illness? No  Have you ever had [place] Syndrome? No    Flu vaccine given per order. Please see immunization tab.     CHIEF COMPLAINT: Vu Leger is a [de-identified] y.o. female who presents for : Follow-up after having surgery for recurrent breast cancer    HPI: Patient presented with aloe up after her surgery she feels fine she denies any chest pain shortness of breath or any other problems she is to see radiation oncology to see if she should get postop radiation as well    Review of Systems:   Constitutional:  Denies fever or chills   Eyes:  Denies change in visual acuity   HENT:  Denies nasal congestion or sore throat   Respiratory:  Denies cough or shortness of breath   Cardiovascular:  Denies chest pain or edema   GI:  Denies abdominal pain, nausea, vomiting, bloody stools or diarrhea   :  Denies dysuria   Musculoskeletal:  Denies back pain or joint pain   Integument:  Denies rash   Neurologic:  Denies headache, focal weakness or sensory changes   Endocrine:  Denies polyuria or polydipsia   Lymphatic:  Denies swollen glands   Psychiatric:  Denies depression or anxiety     Past Medical History:        Diagnosis Date    Abscess of anal and rectal regions     Breast cancer ([facility] [address] 8/25/2014    Cancer ([facility] [address]     breast    DJD (degenerative joint disease)     GERD (gastroesophageal reflux disease)     History of blood transfusion     Hx of blood clots     Hypertension     Long term (current) use of anticoagulants     Obesity     PE (pulmonary embolism) 2/23/2012    Personal history of venous thrombosis and embolism     Recurrent hernia        Past Surgical History:        Procedure Laterality Date  BREAST LUMPECTOMY      RIGHT    COLONOSCOPY      EYE SURGERY      CATARACTS    OTHER SURGICAL HISTORY N/A 06/06/2017    LAPAROSCOPIC VENTRAL HERNIA REPAIR        [place] MASTECTOMY, PARTIAL Left 8/24/2018    MAMMOGRAM GUIDED NEEDLE LOCALIZED, LEFT BREAST LUMPECTOMY WITH LEFT BREAST SENTINEL NODE BIOPSY WITH RADIOISOTOPE AND GAMMA PROBE performed by Rudy Dhillon MD at [place] [address]         Family History:  Family History   Problem Relation Age of Onset    Alcohol Abuse Father     Alcohol Abuse Paternal Uncle     Cancer Mother 80        throat    Cancer Brother 76        throat, smoker       Social History:  Social History     Social History    Marital status:      Spouse name: N/A    Number of children: N/A    Years of education: N/A     Social History Main Topics    Smoking status: Never Smoker    Smokeless tobacco: Never Used    Alcohol use No    Drug use: No    Sexual activity: Not Asked     Other Topics Concern    None     Social History Narrative    None         Allergies:  Cipro xr; Penicillins; and Vicodin [hydrocodone-acetaminophen]    Current Medications:    Prior to Admission medications    Medication Sig Start Date End Date Taking? Authorizing Provider   warfarin (COUMADIN) 5 MG tablet TAKE ONE AND ONE-HALF TABLET BY MOUTH DAILY FOR 5 DAYS OUT OF THE WEEK AND TAKE ONE TABLET DAILY THE OTHER 2 DAYS OF THE WEEK 9/7/18  Yes Kaylan Barron MD   brimonidine (ALPHAGAN P) 0.1 % SOLN Place 1 drop into the right eye every 8 hours   Yes Historical Provider, MD   dorzolamide (TRUSOPT) 2 % ophthalmic solution Place 2 drops into both eyes 3 times daily   Yes Historical Provider, MD   enoxaparin (LOVENOX) 150 MG/ML injection Inject 1 ml daily.  8/17/18  Yes Kaylan Barron MD   amLODIPine (NORVASC) 5 MG tablet TAKE ONE TABLET BY MOUTH DAILY 6/14/18  Yes Kaylan Barron MD   docusate sodium (COLACE) 100 MG capsule Take 1 capsule by mouth 2 times daily  Patient taking

## 2018-11-14 ENCOUNTER — HOSPITAL ENCOUNTER (OUTPATIENT)
Dept: GENERAL RADIOLOGY | Age: 78
Discharge: HOME | End: 2018-11-14
Payer: MEDICARE

## 2018-11-14 DIAGNOSIS — C50.911 BREAST CARCINOMA, FEMALE, RIGHT (HCC): ICD-10-CM

## 2018-11-14 PROCEDURE — 77080 DXA BONE DENSITY AXIAL: CPT

## 2018-12-05 ENCOUNTER — OFFICE VISIT (OUTPATIENT)
Dept: INTERNAL MEDICINE CLINIC | Age: 78
End: 2018-12-05
Payer: MEDICARE

## 2018-12-05 VITALS
SYSTOLIC BLOOD PRESSURE: 120 MMHG | HEIGHT: 62 IN | WEIGHT: 212 LBS | DIASTOLIC BLOOD PRESSURE: 84 MMHG | BODY MASS INDEX: 39.01 KG/M2

## 2018-12-05 DIAGNOSIS — I10 ESSENTIAL HYPERTENSION, BENIGN: ICD-10-CM

## 2018-12-05 DIAGNOSIS — Z17.0 MALIGNANT NEOPLASM OF LOWER-OUTER QUADRANT OF LEFT BREAST OF FEMALE, ESTROGEN RECEPTOR POSITIVE (HCC): Primary | ICD-10-CM

## 2018-12-05 DIAGNOSIS — I26.99 OTHER PULMONARY EMBOLISM WITHOUT ACUTE COR PULMONALE, UNSPECIFIED CHRONICITY (HCC): ICD-10-CM

## 2018-12-05 DIAGNOSIS — B88.8 INFESTATION BY BED BUG: ICD-10-CM

## 2018-12-05 DIAGNOSIS — K21.9 GASTROESOPHAGEAL REFLUX DISEASE WITHOUT ESOPHAGITIS: ICD-10-CM

## 2018-12-05 DIAGNOSIS — C50.512 MALIGNANT NEOPLASM OF LOWER-OUTER QUADRANT OF LEFT BREAST OF FEMALE, ESTROGEN RECEPTOR POSITIVE (HCC): Primary | ICD-10-CM

## 2018-12-05 LAB
INR BLD: 2.34 (ref 0.86–1.14)
PROTHROMBIN TIME: 26.7 SEC (ref 9.8–13)

## 2018-12-05 PROCEDURE — G8417 CALC BMI ABV UP PARAM F/U: HCPCS | Performed by: HOSPITALIST

## 2018-12-05 PROCEDURE — 1123F ACP DISCUSS/DSCN MKR DOCD: CPT | Performed by: HOSPITALIST

## 2018-12-05 PROCEDURE — 1036F TOBACCO NON-USER: CPT | Performed by: HOSPITALIST

## 2018-12-05 PROCEDURE — 1090F PRES/ABSN URINE INCON ASSESS: CPT | Performed by: HOSPITALIST

## 2018-12-05 PROCEDURE — G8399 PT W/DXA RESULTS DOCUMENT: HCPCS | Performed by: HOSPITALIST

## 2018-12-05 PROCEDURE — 99214 OFFICE O/P EST MOD 30 MIN: CPT | Performed by: HOSPITALIST

## 2018-12-05 PROCEDURE — 4040F PNEUMOC VAC/ADMIN/RCVD: CPT | Performed by: HOSPITALIST

## 2018-12-05 PROCEDURE — G8427 DOCREV CUR MEDS BY ELIG CLIN: HCPCS | Performed by: HOSPITALIST

## 2018-12-05 PROCEDURE — G8510 SCR DEP NEG, NO PLAN REQD: HCPCS | Performed by: HOSPITALIST

## 2018-12-05 PROCEDURE — G8482 FLU IMMUNIZE ORDER/ADMIN: HCPCS | Performed by: HOSPITALIST

## 2018-12-05 PROCEDURE — 1101F PT FALLS ASSESS-DOCD LE1/YR: CPT | Performed by: HOSPITALIST

## 2018-12-05 RX ORDER — WARFARIN SODIUM 5 MG/1
TABLET ORAL
Qty: 100 TABLET | Refills: 0 | Status: SHIPPED | OUTPATIENT
Start: 2018-12-05 | End: 2018-12-05 | Stop reason: SDUPTHER

## 2018-12-05 RX ORDER — AMLODIPINE BESYLATE 5 MG/1
5 TABLET ORAL DAILY
Qty: 90 TABLET | Refills: 2 | Status: SHIPPED | OUTPATIENT
Start: 2018-12-05 | End: 2018-12-05 | Stop reason: SDUPTHER

## 2018-12-05 RX ORDER — AMLODIPINE BESYLATE 5 MG/1
5 TABLET ORAL DAILY
Qty: 90 TABLET | Refills: 2 | Status: SHIPPED | OUTPATIENT
Start: 2018-12-05 | End: 2019-12-16 | Stop reason: SDUPTHER

## 2018-12-05 RX ORDER — WARFARIN SODIUM 5 MG/1
TABLET ORAL
Qty: 100 TABLET | Refills: 0 | Status: SHIPPED | OUTPATIENT
Start: 2018-12-05 | End: 2019-03-22 | Stop reason: SDUPTHER

## 2018-12-05 ASSESSMENT — PATIENT HEALTH QUESTIONNAIRE - PHQ9
SUM OF ALL RESPONSES TO PHQ QUESTIONS 1-9: 0
1. LITTLE INTEREST OR PLEASURE IN DOING THINGS: 0
2. FEELING DOWN, DEPRESSED OR HOPELESS: 0
SUM OF ALL RESPONSES TO PHQ QUESTIONS 1-9: 0
SUM OF ALL RESPONSES TO PHQ9 QUESTIONS 1 & 2: 0

## 2018-12-11 ENCOUNTER — ANTI-COAG VISIT (OUTPATIENT)
Dept: INTERNAL MEDICINE CLINIC | Age: 78
End: 2018-12-11

## 2019-01-03 ENCOUNTER — OFFICE VISIT (OUTPATIENT)
Dept: INTERNAL MEDICINE CLINIC | Age: 79
End: 2019-01-03
Payer: MEDICARE

## 2019-01-03 VITALS
DIASTOLIC BLOOD PRESSURE: 70 MMHG | BODY MASS INDEX: 39.75 KG/M2 | HEIGHT: 62 IN | WEIGHT: 216 LBS | SYSTOLIC BLOOD PRESSURE: 138 MMHG

## 2019-01-03 DIAGNOSIS — I10 ESSENTIAL HYPERTENSION, BENIGN: Primary | ICD-10-CM

## 2019-01-03 DIAGNOSIS — C50.512 MALIGNANT NEOPLASM OF LOWER-OUTER QUADRANT OF LEFT BREAST OF FEMALE, ESTROGEN RECEPTOR POSITIVE (HCC): ICD-10-CM

## 2019-01-03 DIAGNOSIS — I26.99 OTHER PULMONARY EMBOLISM WITHOUT ACUTE COR PULMONALE, UNSPECIFIED CHRONICITY (HCC): ICD-10-CM

## 2019-01-03 DIAGNOSIS — K21.9 GASTROESOPHAGEAL REFLUX DISEASE WITHOUT ESOPHAGITIS: ICD-10-CM

## 2019-01-03 DIAGNOSIS — Z17.0 MALIGNANT NEOPLASM OF LOWER-OUTER QUADRANT OF LEFT BREAST OF FEMALE, ESTROGEN RECEPTOR POSITIVE (HCC): ICD-10-CM

## 2019-01-03 LAB
INR BLD: 2.3 (ref 0.86–1.14)
PROTHROMBIN TIME: 26.2 SEC (ref 9.8–13)

## 2019-01-03 PROCEDURE — G8482 FLU IMMUNIZE ORDER/ADMIN: HCPCS | Performed by: INTERNAL MEDICINE

## 2019-01-03 PROCEDURE — 1036F TOBACCO NON-USER: CPT | Performed by: INTERNAL MEDICINE

## 2019-01-03 PROCEDURE — G8427 DOCREV CUR MEDS BY ELIG CLIN: HCPCS | Performed by: INTERNAL MEDICINE

## 2019-01-03 PROCEDURE — 1090F PRES/ABSN URINE INCON ASSESS: CPT | Performed by: INTERNAL MEDICINE

## 2019-01-03 PROCEDURE — 1123F ACP DISCUSS/DSCN MKR DOCD: CPT | Performed by: INTERNAL MEDICINE

## 2019-01-03 PROCEDURE — G8399 PT W/DXA RESULTS DOCUMENT: HCPCS | Performed by: INTERNAL MEDICINE

## 2019-01-03 PROCEDURE — G8417 CALC BMI ABV UP PARAM F/U: HCPCS | Performed by: INTERNAL MEDICINE

## 2019-01-03 PROCEDURE — 99213 OFFICE O/P EST LOW 20 MIN: CPT | Performed by: INTERNAL MEDICINE

## 2019-01-03 PROCEDURE — 1101F PT FALLS ASSESS-DOCD LE1/YR: CPT | Performed by: INTERNAL MEDICINE

## 2019-01-03 PROCEDURE — 4040F PNEUMOC VAC/ADMIN/RCVD: CPT | Performed by: INTERNAL MEDICINE

## 2019-01-08 ENCOUNTER — ANTI-COAG VISIT (OUTPATIENT)
Dept: INTERNAL MEDICINE CLINIC | Age: 79
End: 2019-01-08

## 2019-01-22 ENCOUNTER — OFFICE VISIT (OUTPATIENT)
Dept: SURGERY | Age: 79
End: 2019-01-22
Payer: MEDICARE

## 2019-01-22 ENCOUNTER — HOSPITAL ENCOUNTER (OUTPATIENT)
Dept: MAMMOGRAPHY | Age: 79
Discharge: HOME | End: 2019-01-22
Payer: MEDICARE

## 2019-01-22 VITALS
HEIGHT: 62 IN | TEMPERATURE: 97 F | BODY MASS INDEX: 39.56 KG/M2 | HEART RATE: 77 BPM | WEIGHT: 215 LBS | DIASTOLIC BLOOD PRESSURE: 85 MMHG | SYSTOLIC BLOOD PRESSURE: 126 MMHG

## 2019-01-22 DIAGNOSIS — C50.512 MALIGNANT NEOPLASM OF LOWER-OUTER QUADRANT OF LEFT BREAST OF FEMALE, ESTROGEN RECEPTOR POSITIVE (HCC): Primary | ICD-10-CM

## 2019-01-22 DIAGNOSIS — Z17.0 MALIGNANT NEOPLASM OF LOWER-OUTER QUADRANT OF LEFT BREAST OF FEMALE, ESTROGEN RECEPTOR POSITIVE (HCC): ICD-10-CM

## 2019-01-22 DIAGNOSIS — Z90.12 S/P PARTIAL MASTECTOMY, LEFT: ICD-10-CM

## 2019-01-22 DIAGNOSIS — Z17.0 MALIGNANT NEOPLASM OF LOWER-OUTER QUADRANT OF LEFT BREAST OF FEMALE, ESTROGEN RECEPTOR POSITIVE (HCC): Primary | ICD-10-CM

## 2019-01-22 DIAGNOSIS — C50.512 MALIGNANT NEOPLASM OF LOWER-OUTER QUADRANT OF LEFT BREAST OF FEMALE, ESTROGEN RECEPTOR POSITIVE (HCC): ICD-10-CM

## 2019-01-22 DIAGNOSIS — Z85.3 HISTORY OF RIGHT BREAST CANCER: ICD-10-CM

## 2019-01-22 PROCEDURE — 1036F TOBACCO NON-USER: CPT | Performed by: SURGERY

## 2019-01-22 PROCEDURE — G8482 FLU IMMUNIZE ORDER/ADMIN: HCPCS | Performed by: SURGERY

## 2019-01-22 PROCEDURE — 99213 OFFICE O/P EST LOW 20 MIN: CPT | Performed by: SURGERY

## 2019-01-22 PROCEDURE — 1090F PRES/ABSN URINE INCON ASSESS: CPT | Performed by: SURGERY

## 2019-01-22 PROCEDURE — 1101F PT FALLS ASSESS-DOCD LE1/YR: CPT | Performed by: SURGERY

## 2019-01-22 PROCEDURE — G8399 PT W/DXA RESULTS DOCUMENT: HCPCS | Performed by: SURGERY

## 2019-01-22 PROCEDURE — 4040F PNEUMOC VAC/ADMIN/RCVD: CPT | Performed by: SURGERY

## 2019-01-22 PROCEDURE — G8427 DOCREV CUR MEDS BY ELIG CLIN: HCPCS | Performed by: SURGERY

## 2019-01-22 PROCEDURE — G8417 CALC BMI ABV UP PARAM F/U: HCPCS | Performed by: SURGERY

## 2019-01-22 PROCEDURE — G0279 TOMOSYNTHESIS, MAMMO: HCPCS

## 2019-01-22 PROCEDURE — 1123F ACP DISCUSS/DSCN MKR DOCD: CPT | Performed by: SURGERY

## 2019-01-22 RX ORDER — ANASTROZOLE 1 MG/1
1 TABLET ORAL DAILY
COMMUNITY
End: 2019-09-04 | Stop reason: SINTOL

## 2019-03-22 ENCOUNTER — TELEPHONE (OUTPATIENT)
Dept: INTERNAL MEDICINE CLINIC | Age: 79
End: 2019-03-22

## 2019-03-22 DIAGNOSIS — I26.99 OTHER PULMONARY EMBOLISM WITHOUT ACUTE COR PULMONALE, UNSPECIFIED CHRONICITY (HCC): ICD-10-CM

## 2019-03-22 RX ORDER — WARFARIN SODIUM 5 MG/1
TABLET ORAL
Qty: 100 TABLET | Refills: 0 | Status: SHIPPED | OUTPATIENT
Start: 2019-03-22 | End: 2019-03-25 | Stop reason: SDUPTHER

## 2019-03-25 RX ORDER — WARFARIN SODIUM 5 MG/1
TABLET ORAL
Qty: 100 TABLET | Refills: 1 | Status: SHIPPED | OUTPATIENT
Start: 2019-03-25 | End: 2019-10-01 | Stop reason: SDUPTHER

## 2019-04-04 ENCOUNTER — TELEPHONE (OUTPATIENT)
Dept: INTERNAL MEDICINE CLINIC | Age: 79
End: 2019-04-04

## 2019-04-04 RX ORDER — MECLIZINE HCL 12.5 MG/1
12.5 TABLET ORAL EVERY 6 HOURS PRN
Qty: 15 TABLET | Refills: 0 | Status: SHIPPED | OUTPATIENT
Start: 2019-04-04 | End: 2019-04-08 | Stop reason: ALTCHOICE

## 2019-04-04 NOTE — TELEPHONE ENCOUNTER
Imodium for diarrhea    Drink a lot of fluids    Appointment with another physician    Spoke to patient, she declines appointment with Jorge Angulo. Will order Antivert, patient will call me in the morning if she is not better. Only had a small amount of diarrhea.

## 2019-04-04 NOTE — TELEPHONE ENCOUNTER
Patient would like an appt to be seen by Dr. Domenic Townsend she is experiencing dizziness and diarrhea for the last two days. Please call.

## 2019-04-08 ENCOUNTER — APPOINTMENT (OUTPATIENT)
Dept: CT IMAGING | Age: 79
End: 2019-04-08
Payer: MEDICARE

## 2019-04-08 ENCOUNTER — HOSPITAL ENCOUNTER (EMERGENCY)
Age: 79
Discharge: HOME | End: 2019-04-08
Attending: EMERGENCY MEDICINE
Payer: MEDICARE

## 2019-04-08 VITALS
HEART RATE: 68 BPM | DIASTOLIC BLOOD PRESSURE: 84 MMHG | RESPIRATION RATE: 8 BRPM | TEMPERATURE: 98 F | OXYGEN SATURATION: 98 % | SYSTOLIC BLOOD PRESSURE: 142 MMHG | HEIGHT: 62 IN | WEIGHT: 215 LBS | BODY MASS INDEX: 39.56 KG/M2

## 2019-04-08 DIAGNOSIS — H81.10 BENIGN PAROXYSMAL POSITIONAL VERTIGO, UNSPECIFIED LATERALITY: Primary | ICD-10-CM

## 2019-04-08 LAB
ANION GAP SERPL CALCULATED.3IONS-SCNC: 8 MMOL/L (ref 3–16)
BASOPHILS ABSOLUTE: 0 K/UL (ref 0–0.2)
BASOPHILS RELATIVE PERCENT: 1 %
BUN BLDV-MCNC: 18 MG/DL (ref 7–20)
CALCIUM SERPL-MCNC: 9.9 MG/DL (ref 8.3–10.6)
CHLORIDE BLD-SCNC: 107 MMOL/L (ref 99–110)
CO2: 27 MMOL/L (ref 21–32)
CREAT SERPL-MCNC: 1.1 MG/DL (ref 0.6–1.2)
EKG ATRIAL RATE: 80 BPM
EKG DIAGNOSIS: NORMAL
EKG P AXIS: 31 DEGREES
EKG P-R INTERVAL: 156 MS
EKG Q-T INTERVAL: 412 MS
EKG QRS DURATION: 70 MS
EKG QTC CALCULATION (BAZETT): 457 MS
EKG R AXIS: -11 DEGREES
EKG T AXIS: 11 DEGREES
EKG VENTRICULAR RATE: 74 BPM
EOSINOPHILS ABSOLUTE: 0.1 K/UL (ref 0–0.6)
EOSINOPHILS RELATIVE PERCENT: 2.2 %
GFR AFRICAN AMERICAN: 58
GFR NON-AFRICAN AMERICAN: 48
GLUCOSE BLD-MCNC: 95 MG/DL (ref 70–99)
HCT VFR BLD CALC: 41.8 % (ref 36–48)
HEMOGLOBIN: 13.8 G/DL (ref 12–16)
INR BLD: 2.8 (ref 0.86–1.14)
LYMPHOCYTES ABSOLUTE: 0.8 K/UL (ref 1–5.1)
LYMPHOCYTES RELATIVE PERCENT: 29.1 %
MCH RBC QN AUTO: 30.2 PG (ref 26–34)
MCHC RBC AUTO-ENTMCNC: 33 G/DL (ref 31–36)
MCV RBC AUTO: 91.4 FL (ref 80–100)
MONOCYTES ABSOLUTE: 0.2 K/UL (ref 0–1.3)
MONOCYTES RELATIVE PERCENT: 8.8 %
NEUTROPHILS ABSOLUTE: 1.6 K/UL (ref 1.7–7.7)
NEUTROPHILS RELATIVE PERCENT: 58.9 %
PDW BLD-RTO: 15.3 % (ref 12.4–15.4)
PLATELET # BLD: 236 K/UL (ref 135–450)
PMV BLD AUTO: 8.7 FL (ref 5–10.5)
POTASSIUM REFLEX MAGNESIUM: 4.2 MMOL/L (ref 3.5–5.1)
PRO-BNP: 149 PG/ML (ref 0–449)
PROTHROMBIN TIME: 31.9 SEC (ref 9.8–13)
RBC # BLD: 4.57 M/UL (ref 4–5.2)
SODIUM BLD-SCNC: 142 MMOL/L (ref 136–145)
TROPONIN: <0.01 NG/ML
WBC # BLD: 2.7 K/UL (ref 4–11)

## 2019-04-08 PROCEDURE — 93005 ELECTROCARDIOGRAM TRACING: CPT | Performed by: EMERGENCY MEDICINE

## 2019-04-08 PROCEDURE — 6370000000 HC RX 637 (ALT 250 FOR IP): Performed by: PHYSICIAN ASSISTANT

## 2019-04-08 PROCEDURE — 99284 EMERGENCY DEPT VISIT MOD MDM: CPT

## 2019-04-08 PROCEDURE — 70450 CT HEAD/BRAIN W/O DYE: CPT

## 2019-04-08 PROCEDURE — 70498 CT ANGIOGRAPHY NECK: CPT

## 2019-04-08 PROCEDURE — 80048 BASIC METABOLIC PNL TOTAL CA: CPT

## 2019-04-08 PROCEDURE — 85025 COMPLETE CBC W/AUTO DIFF WBC: CPT

## 2019-04-08 PROCEDURE — 85610 PROTHROMBIN TIME: CPT

## 2019-04-08 PROCEDURE — 83880 ASSAY OF NATRIURETIC PEPTIDE: CPT

## 2019-04-08 PROCEDURE — 6360000004 HC RX CONTRAST MEDICATION: Performed by: EMERGENCY MEDICINE

## 2019-04-08 PROCEDURE — 70496 CT ANGIOGRAPHY HEAD: CPT

## 2019-04-08 PROCEDURE — 84484 ASSAY OF TROPONIN QUANT: CPT

## 2019-04-08 RX ORDER — MECLIZINE HYDROCHLORIDE 25 MG/1
25 TABLET ORAL 3 TIMES DAILY PRN
Qty: 30 TABLET | Refills: 0 | Status: SHIPPED | OUTPATIENT
Start: 2019-04-08 | End: 2019-04-18

## 2019-04-08 RX ORDER — MECLIZINE HYDROCHLORIDE 25 MG/1
25 TABLET ORAL ONCE
Status: COMPLETED | OUTPATIENT
Start: 2019-04-08 | End: 2019-04-08

## 2019-04-08 RX ADMIN — IOPAMIDOL 80 ML: 755 INJECTION, SOLUTION INTRAVENOUS at 11:36

## 2019-04-08 RX ADMIN — MECLIZINE HYDROCHLORIDE 25 MG: 25 TABLET ORAL at 10:56

## 2019-04-08 ASSESSMENT — ENCOUNTER SYMPTOMS
DIARRHEA: 0
VOMITING: 0
BACK PAIN: 0
PHOTOPHOBIA: 0
NAUSEA: 0
SHORTNESS OF BREATH: 0
EYE PAIN: 0
ABDOMINAL PAIN: 0
COUGH: 0

## 2019-04-08 ASSESSMENT — PAIN DESCRIPTION - PAIN TYPE: TYPE: ACUTE PAIN

## 2019-04-08 ASSESSMENT — PAIN SCALES - GENERAL: PAINLEVEL_OUTOF10: 7

## 2019-04-08 ASSESSMENT — PAIN DESCRIPTION - LOCATION: LOCATION: HEAD

## 2019-04-08 NOTE — ED NOTES
Pt ambulated to restroom with out any difficulty. PA ware. Will continue to monitor.       Vince Howard RN  04/08/19 [id number]

## 2019-04-08 NOTE — ED NOTES
Pt up to [facility]. Pt reports dizziness while lying still and worse with movement and changing positions. Pt void x1, denies dysuria at this time.      Mynor Hinds RN  04/08/19 1017

## 2019-04-08 NOTE — ED PROVIDER NOTES
[address] [facility] [address] ENCOUNTER          PHYSICIAN ASSISTANT NOTE       Date of evaluation: 4/8/2019    Chief Complaint     Dizziness      History of Present Illness     Loi Carpenter is a [de-identified] y.o. female with a history of Breast Cancer, GERD, DVT, PE (on coumadin) who presents to the emergency department with dizziness. Patient states that approximately 4 days ago she woke up and felt as if the room was spinning. She states that she laid back down and her symptoms improved slightly. Over the last 4 days she has continued to have room spinning sensations. She states that she contacted her primary care provider who was unable see her in the office, however did prescribe her meclizine. She states that the medication has significantly improved her symptoms, however the symptoms return soon as the medication wears off. She denies any trauma to her head or neck. She states that her symptoms are made worse with any movements of her head side to side. She denies any history of the symptoms in the past. She denies any headaches, loss of consciousness, chest pain, shortness of breath, abdominal pain, nausea, vomiting, weakness, numbness, tingling, or any other symptoms at this time. Review of Systems     Review of Systems   Constitutional: Negative for chills and fever. HENT: Negative for congestion. Eyes: Negative for photophobia and pain. Respiratory: Negative for cough and shortness of breath. Cardiovascular: Negative for chest pain and palpitations. Gastrointestinal: Negative for abdominal pain, diarrhea, nausea and vomiting. Genitourinary: Negative for difficulty urinating and hematuria. Musculoskeletal: Negative for back pain and neck pain. Neurological: Positive for dizziness (room spinning sensation). Negative for syncope, facial asymmetry, weakness, light-headedness, numbness and headaches. Psychiatric/Behavioral: Negative for suicidal ideas.        Past Medical, findings are nonspecific, but most likely represent chronic small vessel ischemic change. 2.  No evidence of an acute intracranial process. CT angiogram of the head and neck      INDICATION: Vertigo. TECHNIQUE: Spiral CT images of the head and neck were obtained during the intravenous administration of 80 mL of Isovue-370 contrast. Images were reconstructed as MIP CT angiogram on the CT computer workstation. Up-to-date CT equipment and radiation dose    reduction techniques were employed. CTA NECK: The right innominate artery is tortuous but patent. The right common carotid artery, right internal carotid artery, right external carotid artery, right subclavian artery, and right vertebral artery are without evidence of any flow-limiting    stenosis, aneurysm, or dissection. There is marked tortuosity of the right vertebral artery. There is some atherosclerotic calcification of the distal right internal carotid artery. There is some moderate tortuosity lesion of the proximal right common    carotid artery. There is common origin of the right innominate artery and left common carotid artery anatomic variation. The proximal portion of the left common carotid artery is obscured by streak artifact from dense contrast within the left innominate vein. The    visualized portions of the left common carotid artery when allowing for some motion artifact are without evidence of any flow-limiting stenosis, or dissection. The left internal carotid arteries patent. There is mild arthroscopic calcification of distal    left internal carotid artery. The left subclavian artery is tortuous. The left vertebral artery is tortuous. No flow-limiting stenosis, aneurysm or dissection is identified. Mild emphysematous changes are identified at the lung apices. There is partial visualization of a 3 mm subpleural pulmonary nodule identified within the periphery of the right upper lobe (series 2 image 1).  This is unchanged from a prior study dated June 8, 2018. No neck lymphadenopathy is identified. The thyroid gland, submandibular glands and parotid glands are normal. Mastoid air cells are clear. Orbits are normal.      Evaluation of the oropharynx is degraded by some motion artifact. Larynx appears unremarkable. IMPRESSION:   1. CT angiogram of the neck is degraded by some motion artifact. When allowing for motion artifact, there is no evidence of any large vessel occlusion, high-grade stenosis or dissection. 2. Stable 3 mm pulmonary nodules identified within the right upper lobe. This is stable in comparison to a prior study dated June 20, 2017 therefore is considered benign. CT ANGIOGRAM HEAD: The basilar artery, anterior cerebral arteries, middle cerebral arteries and posterior cerebral arteries are patent. No aneurysm is identified. No flow-limiting stenosis is identified. No mass or mass effect is identified. No hydrocephalus is identified. No abnormal intracranial enhancement is identified. IMPRESSION:      1. CT angiogram of the head is without evidence of any large vessel occlusion or flow-limiting stenosis. No aneurysm is identified. CTA HEAD W CONTRAST   Final Result   1. Patchy areas of decreased white matter attenuation. The findings are nonspecific, but most likely represent chronic small vessel ischemic change. 2.  No evidence of an acute intracranial process. CT angiogram of the head and neck      INDICATION: Vertigo. TECHNIQUE: Spiral CT images of the head and neck were obtained during the intravenous administration of 80 mL of Isovue-370 contrast. Images were reconstructed as MIP CT angiogram on the CT computer workstation. Up-to-date CT equipment and radiation dose    reduction techniques were employed. CTA NECK: The right innominate artery is tortuous but patent.  The right common carotid artery, right internal carotid artery, right external carotid artery, right subclavian artery, and right vertebral artery are without evidence of any flow-limiting    stenosis, aneurysm, or dissection. There is marked tortuosity of the right vertebral artery. There is some atherosclerotic calcification of the distal right internal carotid artery. There is some moderate tortuosity lesion of the proximal right common    carotid artery. There is common origin of the right innominate artery and left common carotid artery anatomic variation. The proximal portion of the left common carotid artery is obscured by streak artifact from dense contrast within the left innominate vein. The    visualized portions of the left common carotid artery when allowing for some motion artifact are without evidence of any flow-limiting stenosis, or dissection. The left internal carotid arteries patent. There is mild arthroscopic calcification of distal    left internal carotid artery. The left subclavian artery is tortuous. The left vertebral artery is tortuous. No flow-limiting stenosis, aneurysm or dissection is identified. Mild emphysematous changes are identified at the lung apices. There is partial visualization of a 3 mm subpleural pulmonary nodule identified within the periphery of the right upper lobe (series 2 image 1). This is unchanged from a prior study dated June 8, 2018. No neck lymphadenopathy is identified. The thyroid gland, submandibular glands and parotid glands are normal. Mastoid air cells are clear. Orbits are normal.      Evaluation of the oropharynx is degraded by some motion artifact. Larynx appears unremarkable. IMPRESSION:   1. CT angiogram of the neck is degraded by some motion artifact. When allowing for motion artifact, there is no evidence of any large vessel occlusion, high-grade stenosis or dissection. 2. Stable 3 mm pulmonary nodules identified within the right upper lobe.  This is stable in comparison to a prior study given the following medications:  Orders Placed This Encounter   Medications    meclizine (ANTIVERT) tablet 25 mg    iopamidol (ISOVUE-370) 76 % injection 80 mL    meclizine (ANTIVERT) 25 MG tablet     Sig: Take 1 tablet by mouth 3 times daily as needed for Dizziness     Dispense:  30 tablet     Refill:  0       CONSULTS:  IP CONSULT TO [address] / ASSESSMENT / Jillian Mccain is a [de-identified] y.o. female who presents to the emergency department with vertigo for 4 days. Vital signs were stable on presentation and remained stable throughout her stay. Thorough history and physical exam was performed as detailed above. Patient presents to the emergency department with 4 days of vertiginous symptoms. She states she has been able to ambulate without any difficulty. She states her primary care provider gave her a prescription for meclizine, which helped with the symptoms however they returned after the medication wears off. She denies any trauma to her head or neck. She denies any visual changes, headaches, confusion, numbness, tingling, or weakness. Upon my examination cranial nerves II through XII are intact. Patient has 5/5 in upper and lower extremities bilaterally. Normal finger to nose, no pronator drift. No nystagmus, normal heel to shin. CBC, BMP, troponin, BNP, and PT/INR were obtained along with CT scan of her head without contrast, CTA head, and CTA neck. CBC came back at the patient's baseline. BMP came back within normal limits. Troponin came back at less than 0.01 and BNP was 149. PT/INR was at therapeutic level. EKG showed no signs of ST or T-wave changes. The patient was given 25 mg of meclizine in the emergency department for her symptoms. CT scan of the head without contrast showed no evidence of acute intercranial process.  It did show patchy areas of decreased white matter attenuation which were nonspecific but most likely represent chronic small vessel ischemic change. CTA of the head showed no evidence for large vessel occlusion or flow limiting stenosis, there were also no aneurysms identified. CTA of the neck showed no evidence of large vessel occlusion, high-grade stenosis, or dissection. The patient was able to ambulate using her cane in the emergency department without difficulty or ataxia. She states that she has not had any vertiginous symptoms while in the emergency department. I did contact her primary care provider to discuss her workup today andhe feels she is stable to be discharged. He would like to increase her meclizine dosage to 25 mg and he would like to follow-up with her in his clinic. The plan was discussed with both the patient and her family and they're agreeable at this time. I have low suspicion for any posterior circulation abnormality causing the patient's symptoms at this time given her normal neurologic exam, normal imaging, and improvement of symptoms with meclizine. At this time, the patient will be discharged with a prescription for meclizine and told to follow-up with her primary care provider. She was given strict return precautions. Both the patient and her family are agreeable to the plan at this time. This patient was also evaluated by the attending physician. All care plans were discussed and agreed upon. Clinical Impression     1.  Benign paroxysmal positional vertigo, unspecified laterality        Disposition     PATIENT REFERRED TO:  Mami Diaz MD  [address] [place] [address]  [phone]    Call in 3 days      [facility] Emergency Department  [address] [place] [address]  [phone]  Go to   If symptoms worsen      DISCHARGE MEDICATIONS:  Discharge Medication List as of 4/8/2019  3:29 PM      START taking these medications    Details   meclizine (ANTIVERT) 25 MG tablet Take 1 tablet by mouth 3 times daily as needed for Dizziness, Disp-30 tablet, R-0Print DISPOSITION     Discharge     Ryder Tillman, [place]  04/08/19 [id number]

## 2019-04-10 ENCOUNTER — TELEPHONE (OUTPATIENT)
Dept: INTERNAL MEDICINE CLINIC | Age: 79
End: 2019-04-10

## 2019-04-10 NOTE — TELEPHONE ENCOUNTER
Patient called stating she was in the ER on Monday, 04/08/19 and was told to follow up with PCP in around 3 days. Patient also states she wants to know what kind of cough syrup can she take since she is on so many different medications. Please call back to advise.

## 2019-04-17 ENCOUNTER — TELEPHONE (OUTPATIENT)
Dept: INTERNAL MEDICINE CLINIC | Age: 79
End: 2019-04-17

## 2019-04-17 RX ORDER — CEFUROXIME AXETIL 500 MG/1
500 TABLET ORAL 2 TIMES DAILY
Qty: 14 TABLET | Refills: 0 | Status: SHIPPED | OUTPATIENT
Start: 2019-04-17 | End: 2019-04-24

## 2019-04-17 RX ORDER — METHYLPREDNISOLONE 4 MG/1
TABLET ORAL
Qty: 1 KIT | Refills: 0 | Status: SHIPPED | OUTPATIENT
Start: 2019-04-17 | End: 2019-04-23

## 2019-04-17 NOTE — TELEPHONE ENCOUNTER
Patient called asking for an antibiotic for ear infection. Please call to advise.      Mercedes Cardenas 62, [address] [facility] [address]

## 2019-04-18 ENCOUNTER — TELEPHONE (OUTPATIENT)
Dept: INTERNAL MEDICINE CLINIC | Age: 79
End: 2019-04-18

## 2019-04-18 NOTE — TELEPHONE ENCOUNTER
Patient called and would like to know why methylPREDNISolone (MEDROL DOSEPACK) 4 MG tablet [[id number]]    was called in for her and questions on directions.         Please yoana to advise

## 2019-04-23 ENCOUNTER — OFFICE VISIT (OUTPATIENT)
Dept: INTERNAL MEDICINE CLINIC | Age: 79
End: 2019-04-23
Payer: MEDICARE

## 2019-04-23 VITALS
WEIGHT: 218 LBS | DIASTOLIC BLOOD PRESSURE: 90 MMHG | SYSTOLIC BLOOD PRESSURE: 130 MMHG | HEIGHT: 62 IN | BODY MASS INDEX: 40.12 KG/M2

## 2019-04-23 DIAGNOSIS — H50.10 EXOTROPIA OF RIGHT EYE: ICD-10-CM

## 2019-04-23 DIAGNOSIS — H81.20 VESTIBULAR NEURONITIS, UNSPECIFIED LATERALITY: Primary | ICD-10-CM

## 2019-04-23 PROCEDURE — G8510 SCR DEP NEG, NO PLAN REQD: HCPCS | Performed by: INTERNAL MEDICINE

## 2019-04-23 PROCEDURE — G8417 CALC BMI ABV UP PARAM F/U: HCPCS | Performed by: INTERNAL MEDICINE

## 2019-04-23 PROCEDURE — G8427 DOCREV CUR MEDS BY ELIG CLIN: HCPCS | Performed by: INTERNAL MEDICINE

## 2019-04-23 PROCEDURE — 1123F ACP DISCUSS/DSCN MKR DOCD: CPT | Performed by: INTERNAL MEDICINE

## 2019-04-23 PROCEDURE — G8399 PT W/DXA RESULTS DOCUMENT: HCPCS | Performed by: INTERNAL MEDICINE

## 2019-04-23 PROCEDURE — 4040F PNEUMOC VAC/ADMIN/RCVD: CPT | Performed by: INTERNAL MEDICINE

## 2019-04-23 PROCEDURE — 99213 OFFICE O/P EST LOW 20 MIN: CPT | Performed by: INTERNAL MEDICINE

## 2019-04-23 PROCEDURE — 1036F TOBACCO NON-USER: CPT | Performed by: INTERNAL MEDICINE

## 2019-04-23 PROCEDURE — 1090F PRES/ABSN URINE INCON ASSESS: CPT | Performed by: INTERNAL MEDICINE

## 2019-04-23 ASSESSMENT — PATIENT HEALTH QUESTIONNAIRE - PHQ9
SUM OF ALL RESPONSES TO PHQ QUESTIONS 1-9: 0
2. FEELING DOWN, DEPRESSED OR HOPELESS: 0
SUM OF ALL RESPONSES TO PHQ9 QUESTIONS 1 & 2: 0
1. LITTLE INTEREST OR PLEASURE IN DOING THINGS: 0
SUM OF ALL RESPONSES TO PHQ QUESTIONS 1-9: 0

## 2019-04-23 NOTE — PROGRESS NOTES
Follow Up Visit  Established Patient Visit    Patient:  Alexei Barth                                               : 1940  Age: [de-identified] y.o. MRN: [id number]  Date : 2019      CHIEF COMPLAINT: Alexei Barth is a [de-identified] y.o. female who presents for :  vertigo    Pt is here for follow up of her vertigo, she described her vertigo started 2 weeks ago, when she woke up. She described room spinning without hearing loss. It lasted for hours and did not get better until she took meclizine. She said the meclizine did work temporarily, but not got rid of it completely. She did visit ED, where they did do CT/CTA which did not show any acute stroke or bleeding. Pt was discharged. She did say that she had a cold like symptom about 2-3 weeks ago preceding the vertigo. She was recently started on medrodose pack and ceftin which she said has helped her a lot. She did not take the medro dose pack based on the instruction, instead, just 1-2 pills a day.       Patient Active Problem List    Diagnosis Date Noted    S/P partial mastectomy, left 2019    History of right breast cancer 2019    Chronic benign neutropenia ([facility] [address] 2018    Incisional hernia, incarcerated     Abnormal CT scan, lung 2017    Breast cancer ([facility] [address] 2014    Pulmonary embolism ([facility] [address] 2012     Replacing Inactive Diagnoses      GERD (gastroesophageal reflux disease) 2010    DJD (degenerative joint disease) 2010    Essential hypertension, benign 2010    Vitamin D deficiency 2010       Constitutional:  Denies fever or chills   Eyes:  Denies change in visual acuity   HENT:  Denies nasal congestion or sore throat   Respiratory:  Denies cough or shortness of breath   Cardiovascular:  Denies chest pain or edema   GI:  Denies abdominal pain, nausea, vomiting, bloody stools or diarrhea   :  Denies dysuria   Musculoskeletal:  Denies back pain or joint pain   Integument:  Denies rash   Neurologic: Denies headache, focal weakness or sensory changes   Endocrine:  Denies polyuria or polydipsia   Lymphatic:  Denies swollen glands   Psychiatric:  Denies depression or anxiety     Past Medical History:        Diagnosis Date    Abscess of anal and rectal regions     Breast cancer ([facility] [address] 8/25/2014    Cancer (HCC)     breast    DJD (degenerative joint disease)     GERD (gastroesophageal reflux disease)     History of blood transfusion     Hx of blood clots     Hypertension     Long term (current) use of anticoagulants     Obesity     PE (pulmonary embolism) 2/23/2012    Personal history of venous thrombosis and embolism     Recurrent hernia        Past Surgical History:        Procedure Laterality Date    BREAST LUMPECTOMY      RIGHT    COLONOSCOPY      EYE SURGERY      CATARACTS    OTHER SURGICAL HISTORY N/A 06/06/2017    LAPAROSCOPIC VENTRAL HERNIA REPAIR        CT MASTECTOMY, PARTIAL Left 8/24/2018    MAMMOGRAM GUIDED NEEDLE LOCALIZED, LEFT BREAST LUMPECTOMY WITH LEFT BREAST SENTINEL NODE BIOPSY WITH RADIOISOTOPE AND GAMMA PROBE performed by Annika Milan MD at [place] [address]           Allergies:  Cipro xr; Penicillins; and Vicodin [hydrocodone-acetaminophen]    Current Medications:    Prior to Admission medications    Medication Sig Start Date End Date Taking? Authorizing Provider   methylPREDNISolone (MEDROL DOSEPACK) 4 MG tablet Take by mouth.  4/17/19 4/23/19 Yes Neri Mei MD   cefUROXime (CEFTIN) 500 MG tablet Take 1 tablet by mouth 2 times daily for 7 days 4/17/19 4/24/19 Yes Neri Mei MD   warfarin (COUMADIN) 5 MG tablet TAKE ONE AND ONE-HALF TABLET BY MOUTH DAILY FOR 5 DAYS OUT OF THE WEEK AND TAKE ONE TABLET DAILY THE OTHER 2 DAYS OF THE WEEK 3/25/19  Yes Neri eMi MD   anastrozole (ARIMIDEX) 1 MG tablet Take 1 mg by mouth daily   Yes Historical Provider, MD   amLODIPine (NORVASC) 5 MG tablet Take 1 tablet by mouth daily 12/5/18  Yes Beto Hagen MD brimonidine (ALPHAGAN P) 0.1 % SOLN Place 1 drop into the right eye every 8 hours   Yes Historical Provider, MD   dorzolamide (TRUSOPT) 2 % ophthalmic solution Place 2 drops into both eyes 3 times daily   Yes Historical Provider, MD   docusate sodium (COLACE) 100 MG capsule Take 1 capsule by mouth 2 times daily  Patient taking differently: Take 100 mg by mouth 2 times daily as needed  2/15/17  Yes CHAIM Cerna CNP   acetaminophen (TYLENOL) 650 MG extended release tablet Take 2 tablets by mouth every 8 hours as needed for Pain 2/15/17  Yes CHAIM Cerna CNP   magnesium hydroxide (MILK OF MAGNESIA CONCENTRATE) 2400 MG/10ML SUSP Take 2,400 mg by mouth once as needed   Yes Historical Provider, MD   ascorbic acid (VITAMIN C) 500 MG tablet Take 500 mg by mouth daily. Yes Historical Provider, MD   vitamin D (CHOLECALCIFEROL) 400 UNITS TABS tablet Take 400 Units by mouth daily. Yes Historical Provider, MD   ferrous sulfate 325 (65 FE) MG tablet Take 325 mg by mouth daily (with breakfast). Yes Historical Provider, MD           Physical Exam:      Constitutional:  Well developed, well nourished, no acute distress, non-toxic appearance   Eyes:  PERRL, conjunctiva normal   HENT:  Atraumatic, exotropia of right eye with up vertical gaze, nose normal, oropharynx moist, no pharyngeal exudates. Neck- normal range of motion, no tenderness, supple   Respiratory:  No respiratory distress, normal breath sounds, no rales, no wheezing   Cardiovascular:  Normal rate, normal rhythm, no murmurs, no gallops, no rubs   GI:  Soft, nondistended, normal bowel sounds, nontender, no organomegaly, no mass, no rebound, no guarding   :  No costovertebral angle tenderness   Musculoskeletal:  No edema, no tenderness, no deformities.  Back- no tenderness  Integument:  Well hydrated, no rash   Lymphatic:  No lymphadenopathy noted   Neurologic:  Alert & oriented x 3, CN 2-12 normal, normal motor function, normal sensory function, no focal deficits noted   Psychiatric:  Speech and behavior appropriate       Vitals: BP (!) 130/90   Ht 5' 2\" (1.575 m)   Wt 218 lb (98.9 kg)   BMI 39.87 kg/m²     Body mass index is 39.87 kg/m². Wt Readings from Last 3 Encounters:   04/23/19 218 lb (98.9 kg)   04/08/19 215 lb (97.5 kg)   01/22/19 215 lb (97.5 kg)         LABS:    CBC:   Lab Results   Component Value Date    WBC 2.7 (L) 04/08/2019    HGB 13.8 04/08/2019    HCT 41.8 04/08/2019    MCV 91.4 04/08/2019     04/08/2019           Lab Results   Component Value Date    IRON 55 06/21/2017    TIBC 171 (L) 06/21/2017    [id number] 03/10/2016                                                             BMP:    Lab Results   Component Value Date     04/08/2019    K 4.2 04/08/2019     04/08/2019    CO2 27 04/08/2019       LFT's:   Lab Results   Component Value Date    ALT 9 (L) 10/01/2018    AST 17 10/01/2018    ALKPHOS 69 10/01/2018    BILITOT 0.3 10/01/2018       Lipids:   Lab Results   Component Value Date    CHOL 160 06/02/2018    HDL 64 (H) 06/02/2018    LDLCALC 84 06/02/2018    TRIG 58 06/02/2018       INR:   Lab Results   Component Value Date    INR 2.80 (H) 04/08/2019    INR 2.30 (H) 01/03/2019    INR 2.34 (H) 12/05/2018    PROTIME 31.9 (H) 04/08/2019    PROTIME 26.2 (H) 01/03/2019    PROTIME 26.7 (H) 12/05/2018       U/A:  Lab Results   Component Value Date    LABMICR YES 06/02/2018        No results found for: LABA1C     Lab Results   Component Value Date    CREATININE 1.1 04/08/2019       -----------------------------------------------------------------       Assessment/Plan:   1. Vestibular neuritis, unspecified laterality  Likely vertigo is caused by acoustic neuritis after acute URI  Now better, can have pt finish antibiotic and medro dose pack    2.  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

## 2019-08-01 ENCOUNTER — TELEPHONE (OUTPATIENT)
Dept: PHARMACY | Age: 79
End: 2019-08-01

## 2019-08-01 ENCOUNTER — OFFICE VISIT (OUTPATIENT)
Dept: INTERNAL MEDICINE CLINIC | Age: 79
End: 2019-08-01
Payer: MEDICARE

## 2019-08-01 VITALS
DIASTOLIC BLOOD PRESSURE: 80 MMHG | WEIGHT: 221 LBS | HEIGHT: 62 IN | BODY MASS INDEX: 40.67 KG/M2 | SYSTOLIC BLOOD PRESSURE: 122 MMHG

## 2019-08-01 DIAGNOSIS — I26.99 PULMONARY EMBOLISM WITHOUT ACUTE COR PULMONALE, UNSPECIFIED CHRONICITY, UNSPECIFIED PULMONARY EMBOLISM TYPE (HCC): Primary | ICD-10-CM

## 2019-08-01 DIAGNOSIS — I26.99 PULMONARY EMBOLISM WITHOUT ACUTE COR PULMONALE, UNSPECIFIED CHRONICITY, UNSPECIFIED PULMONARY EMBOLISM TYPE (HCC): ICD-10-CM

## 2019-08-01 LAB
INR BLD: 2.9 (ref 0.86–1.14)
PROTHROMBIN TIME: 33.1 SEC (ref 9.8–13)

## 2019-08-01 PROCEDURE — 1036F TOBACCO NON-USER: CPT | Performed by: INTERNAL MEDICINE

## 2019-08-01 PROCEDURE — G8428 CUR MEDS NOT DOCUMENT: HCPCS | Performed by: INTERNAL MEDICINE

## 2019-08-01 PROCEDURE — G8399 PT W/DXA RESULTS DOCUMENT: HCPCS | Performed by: INTERNAL MEDICINE

## 2019-08-01 PROCEDURE — 1123F ACP DISCUSS/DSCN MKR DOCD: CPT | Performed by: INTERNAL MEDICINE

## 2019-08-01 PROCEDURE — 1090F PRES/ABSN URINE INCON ASSESS: CPT | Performed by: INTERNAL MEDICINE

## 2019-08-01 PROCEDURE — 99213 OFFICE O/P EST LOW 20 MIN: CPT | Performed by: INTERNAL MEDICINE

## 2019-08-01 PROCEDURE — 4040F PNEUMOC VAC/ADMIN/RCVD: CPT | Performed by: INTERNAL MEDICINE

## 2019-08-01 PROCEDURE — G8417 CALC BMI ABV UP PARAM F/U: HCPCS | Performed by: INTERNAL MEDICINE

## 2019-08-01 NOTE — TELEPHONE ENCOUNTER
Received referral from Dr. Gala House to manage warfarin therapy. Called patient in order to schedule an appointment. Patient had INR drawn today at lab, but result is not back yet. Will review INR result tomorrow and call patient back to discuss next check.       Ramirez Rodriguez, PharmD  [facility]   [facility] Ph: [phone]  [place] Ph: [phone]  8/1/2019 4:38 PM

## 2019-08-01 NOTE — PROGRESS NOTES
[id number] 03/10/2016                                                             BMP:    Lab Results   Component Value Date     04/08/2019    K 4.2 04/08/2019     04/08/2019    CO2 27 04/08/2019       LFT's:   Lab Results   Component Value Date    ALT 9 (L) 10/01/2018    AST 17 10/01/2018    ALKPHOS 69 10/01/2018    BILITOT 0.3 10/01/2018       Lipids:   Lab Results   Component Value Date    CHOL 160 06/02/2018    HDL 64 (H) 06/02/2018    LDLCALC 84 06/02/2018    TRIG 58 06/02/2018       INR:   Lab Results   Component Value Date    INR 2.80 (H) 04/08/2019    INR 2.30 (H) 01/03/2019    INR 2.34 (H) 12/05/2018    PROTIME 31.9 (H) 04/08/2019    PROTIME 26.2 (H) 01/03/2019    PROTIME 26.7 (H) 12/05/2018       U/A:  Lab Results   Component Value Date    LABMICR YES 06/02/2018        No results found for: LABA1C     Lab Results   Component Value Date    CREATININE 1.1 04/08/2019       -----------------------------------------------------------------       Assessment/Plan:   1. History of pulmonary embolism - INR goal of 2-3  - continue coumadin indefinitely  - set up coumadin clinic today  - repeat PT/INR today    2. Vertigo - resolved with prn meclizine    3. HTN - stable  - continue home amlodipine    4.  Benign neutropenia - stable    Patient was discussed with Dr. Ramez Smith PGY-3

## 2019-08-08 ENCOUNTER — ANTI-COAG VISIT (OUTPATIENT)
Dept: INTERNAL MEDICINE CLINIC | Age: 79
End: 2019-08-08

## 2019-09-04 ENCOUNTER — ANTI-COAG VISIT (OUTPATIENT)
Dept: PHARMACY | Age: 79
End: 2019-09-04
Payer: MEDICARE

## 2019-09-04 LAB — INTERNATIONAL NORMALIZATION RATIO, POC: 3.1

## 2019-09-04 PROCEDURE — 85610 PROTHROMBIN TIME: CPT

## 2019-09-04 PROCEDURE — 99213 OFFICE O/P EST LOW 20 MIN: CPT

## 2019-09-10 ENCOUNTER — TELEPHONE (OUTPATIENT)
Dept: INTERNAL MEDICINE CLINIC | Age: 79
End: 2019-09-10

## 2019-09-10 NOTE — TELEPHONE ENCOUNTER
Patient stated someone scheduled a surgery for cataract with Dr. Henry Kitchen. Stated Dr. Wyatt Santiago referred her and she does not know who that is and no one talked to her about surgery. Patient wants to know if Dr. Salome Dubin knows anything about this.

## 2019-09-25 ENCOUNTER — ANTI-COAG VISIT (OUTPATIENT)
Dept: PHARMACY | Age: 79
End: 2019-09-25
Payer: MEDICARE

## 2019-09-25 ENCOUNTER — TELEPHONE (OUTPATIENT)
Dept: INTERNAL MEDICINE CLINIC | Age: 79
End: 2019-09-25

## 2019-09-25 DIAGNOSIS — I26.99 OTHER PULMONARY EMBOLISM WITHOUT ACUTE COR PULMONALE, UNSPECIFIED CHRONICITY (HCC): Primary | ICD-10-CM

## 2019-09-25 LAB
INR BLD: 5.31 (ref 0.86–1.14)
INTERNATIONAL NORMALIZATION RATIO, POC: 5.2
PROTHROMBIN TIME: 60.5 SEC (ref 9.8–13)

## 2019-09-25 PROCEDURE — 99211 OFF/OP EST MAY X REQ PHY/QHP: CPT

## 2019-09-25 PROCEDURE — 85610 PROTHROMBIN TIME: CPT

## 2019-09-25 NOTE — TELEPHONE ENCOUNTER
5.31 hank     Spoke to Rebecca at the outpatient [facility] clinic. She will call the patient for follow up.

## 2019-09-25 NOTE — TELEPHONE ENCOUNTER
Emilia with [facility] called with a critical lab. She states she could not give it to us, so she needs a call back. Please call to advise.

## 2019-09-27 ENCOUNTER — ANTI-COAG VISIT (OUTPATIENT)
Dept: INTERNAL MEDICINE CLINIC | Age: 79
End: 2019-09-27

## 2019-10-01 DIAGNOSIS — I26.99 OTHER PULMONARY EMBOLISM WITHOUT ACUTE COR PULMONALE, UNSPECIFIED CHRONICITY (HCC): ICD-10-CM

## 2019-10-02 ENCOUNTER — ANTI-COAG VISIT (OUTPATIENT)
Dept: PHARMACY | Age: 79
End: 2019-10-02
Payer: MEDICARE

## 2019-10-02 LAB — INTERNATIONAL NORMALIZATION RATIO, POC: 2.8

## 2019-10-02 PROCEDURE — 99211 OFF/OP EST MAY X REQ PHY/QHP: CPT

## 2019-10-02 PROCEDURE — 85610 PROTHROMBIN TIME: CPT

## 2019-10-03 RX ORDER — WARFARIN SODIUM 5 MG/1
TABLET ORAL
Qty: 100 TABLET | Refills: 2 | Status: SHIPPED | OUTPATIENT
Start: 2019-10-03 | End: 2020-10-20

## 2019-10-17 ENCOUNTER — TELEPHONE (OUTPATIENT)
Dept: PHARMACY | Age: 79
End: 2019-10-17

## 2019-10-21 ENCOUNTER — ANTI-COAG VISIT (OUTPATIENT)
Dept: PHARMACY | Age: 79
End: 2019-10-21
Payer: MEDICARE

## 2019-10-21 LAB — INTERNATIONAL NORMALIZATION RATIO, POC: 1.4

## 2019-10-21 PROCEDURE — 99211 OFF/OP EST MAY X REQ PHY/QHP: CPT

## 2019-10-21 PROCEDURE — 85610 PROTHROMBIN TIME: CPT

## 2019-10-25 ENCOUNTER — ANTI-COAG VISIT (OUTPATIENT)
Dept: PHARMACY | Age: 79
End: 2019-10-25
Payer: MEDICARE

## 2019-10-25 LAB — INTERNATIONAL NORMALIZATION RATIO, POC: 1.9

## 2019-10-25 PROCEDURE — 85610 PROTHROMBIN TIME: CPT

## 2019-10-25 PROCEDURE — 99211 OFF/OP EST MAY X REQ PHY/QHP: CPT

## 2019-10-29 ENCOUNTER — TELEPHONE (OUTPATIENT)
Dept: INTERNAL MEDICINE CLINIC | Age: 79
End: 2019-10-29

## 2019-11-11 ENCOUNTER — ANTI-COAG VISIT (OUTPATIENT)
Dept: PHARMACY | Age: 79
End: 2019-11-11
Payer: MEDICARE

## 2019-11-11 ENCOUNTER — OFFICE VISIT (OUTPATIENT)
Dept: INTERNAL MEDICINE CLINIC | Age: 79
End: 2019-11-11
Payer: MEDICARE

## 2019-11-11 VITALS
SYSTOLIC BLOOD PRESSURE: 122 MMHG | HEIGHT: 62 IN | BODY MASS INDEX: 40.48 KG/M2 | DIASTOLIC BLOOD PRESSURE: 76 MMHG | WEIGHT: 220 LBS

## 2019-11-11 DIAGNOSIS — Z00.00 HEALTHCARE MAINTENANCE: ICD-10-CM

## 2019-11-11 DIAGNOSIS — Z90.49 HISTORY OF CHOLECYSTECTOMY: Primary | ICD-10-CM

## 2019-11-11 DIAGNOSIS — Z90.49 HISTORY OF CHOLECYSTECTOMY: ICD-10-CM

## 2019-11-11 LAB
A/G RATIO: 0.9 (ref 1.1–2.2)
ALBUMIN SERPL-MCNC: 3.4 G/DL (ref 3.4–5)
ALP BLD-CCNC: 66 U/L (ref 40–129)
ALT SERPL-CCNC: 11 U/L (ref 10–40)
ANION GAP SERPL CALCULATED.3IONS-SCNC: 11 MMOL/L (ref 3–16)
AST SERPL-CCNC: 15 U/L (ref 15–37)
BASOPHILS ABSOLUTE: 0 K/UL (ref 0–0.2)
BASOPHILS RELATIVE PERCENT: 0.8 %
BILIRUB SERPL-MCNC: <0.2 MG/DL (ref 0–1)
BUN BLDV-MCNC: 15 MG/DL (ref 7–20)
CALCIUM SERPL-MCNC: 9.4 MG/DL (ref 8.3–10.6)
CHLORIDE BLD-SCNC: 109 MMOL/L (ref 99–110)
CO2: 23 MMOL/L (ref 21–32)
CREAT SERPL-MCNC: 1 MG/DL (ref 0.6–1.2)
EOSINOPHILS ABSOLUTE: 0.2 K/UL (ref 0–0.6)
EOSINOPHILS RELATIVE PERCENT: 5.9 %
GFR AFRICAN AMERICAN: >60
GFR NON-AFRICAN AMERICAN: 53
GLOBULIN: 3.6 G/DL
GLUCOSE BLD-MCNC: 97 MG/DL (ref 70–99)
HCT VFR BLD CALC: 38 % (ref 36–48)
HEMOGLOBIN: 12.5 G/DL (ref 12–16)
INTERNATIONAL NORMALIZATION RATIO, POC: 4.1
LYMPHOCYTES ABSOLUTE: 0.9 K/UL (ref 1–5.1)
LYMPHOCYTES RELATIVE PERCENT: 32.5 %
MCH RBC QN AUTO: 30.7 PG (ref 26–34)
MCHC RBC AUTO-ENTMCNC: 33 G/DL (ref 31–36)
MCV RBC AUTO: 92.9 FL (ref 80–100)
MONOCYTES ABSOLUTE: 0.4 K/UL (ref 0–1.3)
MONOCYTES RELATIVE PERCENT: 14.3 %
NEUTROPHILS ABSOLUTE: 1.3 K/UL (ref 1.7–7.7)
NEUTROPHILS RELATIVE PERCENT: 46.5 %
PDW BLD-RTO: 16.2 % (ref 12.4–15.4)
PLATELET # BLD: 152 K/UL (ref 135–450)
PMV BLD AUTO: 10.4 FL (ref 5–10.5)
POTASSIUM SERPL-SCNC: 4 MMOL/L (ref 3.5–5.1)
RBC # BLD: 4.09 M/UL (ref 4–5.2)
SODIUM BLD-SCNC: 143 MMOL/L (ref 136–145)
TOTAL PROTEIN: 7 G/DL (ref 6.4–8.2)
WBC # BLD: 2.7 K/UL (ref 4–11)

## 2019-11-11 PROCEDURE — 99211 OFF/OP EST MAY X REQ PHY/QHP: CPT

## 2019-11-11 PROCEDURE — 85610 PROTHROMBIN TIME: CPT

## 2019-11-11 PROCEDURE — 1123F ACP DISCUSS/DSCN MKR DOCD: CPT | Performed by: INTERNAL MEDICINE

## 2019-11-11 PROCEDURE — G8484 FLU IMMUNIZE NO ADMIN: HCPCS | Performed by: INTERNAL MEDICINE

## 2019-11-11 PROCEDURE — 99213 OFFICE O/P EST LOW 20 MIN: CPT | Performed by: INTERNAL MEDICINE

## 2019-11-11 PROCEDURE — 1090F PRES/ABSN URINE INCON ASSESS: CPT | Performed by: INTERNAL MEDICINE

## 2019-11-11 PROCEDURE — 4040F PNEUMOC VAC/ADMIN/RCVD: CPT | Performed by: INTERNAL MEDICINE

## 2019-11-11 PROCEDURE — 1036F TOBACCO NON-USER: CPT | Performed by: INTERNAL MEDICINE

## 2019-11-11 PROCEDURE — G8417 CALC BMI ABV UP PARAM F/U: HCPCS | Performed by: INTERNAL MEDICINE

## 2019-11-11 PROCEDURE — G8399 PT W/DXA RESULTS DOCUMENT: HCPCS | Performed by: INTERNAL MEDICINE

## 2019-11-11 PROCEDURE — G8427 DOCREV CUR MEDS BY ELIG CLIN: HCPCS | Performed by: INTERNAL MEDICINE

## 2019-11-26 ENCOUNTER — TELEPHONE (OUTPATIENT)
Dept: PHARMACY | Age: 79
End: 2019-11-26

## 2019-12-04 ENCOUNTER — ANTI-COAG VISIT (OUTPATIENT)
Dept: PHARMACY | Age: 79
End: 2019-12-04
Payer: MEDICARE

## 2019-12-04 LAB — INTERNATIONAL NORMALIZATION RATIO, POC: 3.4

## 2019-12-04 PROCEDURE — 99211 OFF/OP EST MAY X REQ PHY/QHP: CPT

## 2019-12-04 PROCEDURE — 99212 OFFICE O/P EST SF 10 MIN: CPT

## 2019-12-04 PROCEDURE — 85610 PROTHROMBIN TIME: CPT

## 2019-12-16 DIAGNOSIS — I10 ESSENTIAL HYPERTENSION, BENIGN: ICD-10-CM

## 2019-12-18 RX ORDER — AMLODIPINE BESYLATE 5 MG/1
TABLET ORAL
Qty: 90 TABLET | Refills: 1 | Status: SHIPPED | OUTPATIENT
Start: 2019-12-18 | End: 2020-06-11

## 2019-12-19 ENCOUNTER — ANTI-COAG VISIT (OUTPATIENT)
Dept: PHARMACY | Age: 79
End: 2019-12-19
Payer: MEDICARE

## 2019-12-19 LAB — INTERNATIONAL NORMALIZATION RATIO, POC: 2.2

## 2019-12-19 PROCEDURE — 85610 PROTHROMBIN TIME: CPT

## 2019-12-19 PROCEDURE — 99211 OFF/OP EST MAY X REQ PHY/QHP: CPT

## 2020-01-10 ENCOUNTER — ANTI-COAG VISIT (OUTPATIENT)
Dept: PHARMACY | Age: 80
End: 2020-01-10
Payer: MEDICARE

## 2020-01-10 LAB — INTERNATIONAL NORMALIZATION RATIO, POC: 2.2

## 2020-01-10 PROCEDURE — 99211 OFF/OP EST MAY X REQ PHY/QHP: CPT

## 2020-01-10 PROCEDURE — 85610 PROTHROMBIN TIME: CPT

## 2020-01-10 NOTE — PROGRESS NOTES
major bleeding reported   Vitamin K intake Normally has ~0-1 serving of green, leafy vegetables per month (occasional broccoli or yohan greens)   Recent vomiting/diarrhea/fever, changes in weight or activity level None reported   Tobacco or alcohol use Patient denies both   Upcoming surgeries or procedures None reported     Assessment/Plan:  Patient's INR was therapeutic today (2.2) for second consecutive visit following warfarin dose reduction on 12/4. INR has been labile since patient enrolled in clinic in September. She denies any missed/extra doses, diet changes, or medication changes since last visit. She does endorse decrease in physical activity due to colder weather, which may explain recent elevated INR levels. At initial visit, patient reported she had been taking warfarin 5 mg daily consistently for ~2 years with mostly therapeutic INR levels. However, INR has been trending high recently (except for when patient was off warfarin during recent hospital admission). Patient was instructed to continue warfarin 2.5 mg on Sun/Tue/Thur, and 5 mg on all other days. Patient was instructed to follow warfarin dosing instructions provided by clinic and not to self-dose warfarin. Repeat INR in 4 weeks. Patient was reminded to maintain consistent vitamin K intake and call with any bleeding, medication changes, or fever/vomiting/diarrhea. Patient understands dosing directions and information discussed. Dosing schedule and follow up appointment given to patient. Progress note routed to referring physician's office. Patient acknowledges working in consult agreement with pharmacist as referred by his/her physician. Next Clinic Appointment:  2/7    Please call [facility] at [phone] with any questions. Thanks! Moody Mcgarry.  Elwood Runner, PharmD, [facility]  [facility]  Ph: [phone]  1/10/2020 1:11 PM

## 2020-02-07 ENCOUNTER — ANTI-COAG VISIT (OUTPATIENT)
Dept: PHARMACY | Age: 80
End: 2020-02-07
Payer: MEDICARE

## 2020-02-07 LAB — INR BLD: 2.5

## 2020-02-07 PROCEDURE — 85610 PROTHROMBIN TIME: CPT

## 2020-02-07 PROCEDURE — 99211 OFF/OP EST MAY X REQ PHY/QHP: CPT

## 2020-02-07 NOTE — PROGRESS NOTES
ANTICOAGULATION SERVICE    Ritu Mayer is a 78 y.o. female with PMHx significant for DVT, PE, breast CA who presents to clinic 2/7/2020 for anticoagulation monitoring and adjustment. 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     Anticoagulation Indication(s):  DVT, PE    Referring Physician:  Dr. Ruby Weston  Goal INR Range:  2-3  Duration of Anticoagulation Therapy:  Indefinite  Time of day dose taken:  AM  Product patient has at home:  warfarin 5 mg (peach)      INR Summary                            Warfarin regimen (mg)  Date INR   A/P    Sun Mon Tue Wed Thu Fri Sat Mg/wk  2/7 2.5 At goal, no change  2.5 5 2.5 5 2.5 5 5 27.5  1/10 2.2 At goal, no change  2.5 5 2.5 5 2.5 5 5 27.5  12/19 2.2 At goal, no change  2.5 5 2.5 5 2.5 5 5 27.5  12/4 3.4 Above goal, decrease  2.5 5 2.5 5 2.5 5 5 27.5  11/11 4.1 Above goal, hold + dec 2.5 5 0/5 5 2.5 5 5 30  10/25 1.9 Below goal, continue  2.5 5 5 5 5 5 5 32.5  10/21 1.4 Below goal (off warfarin) 2.5 5 5 5 5 5 5 32.5  10/2 2.8 At goal, decrease  2.5 5 5 5 5 5 5 32.5  9/25 5.2 Above goal (extra doses) 5 5 5 5 0/5 0/5 5 35  9/4 3.1 Above goal, continue  5 5 5 5 5 5 5 35  8/1 2.9 Per PCP, no change  5 5 5 5 5 5 5 35    Last CBC:  Lab Results   Component Value Date    RBC 4.09 11/11/2019    HGB 12.5 11/11/2019    HCT 38.0 11/11/2019    MCV 92.9 11/11/2019    MCH 30.7 11/11/2019    MPV 10.4 11/11/2019    RDW 16.2 (H) 11/11/2019     11/11/2019       Patient History:  Recent hospitalizations/HC visits None    Recent medication changes Med rec reviewed at first visit   Medications taken regularly that may interact with warfarin or alter INR No significant drug interactions identified   Warfarin dose taken as prescribed -Yes  -Patient has pillbox, but doesn't currently use it; she has a system for taking medications that works well   Signs/symptoms of bleeding No h/o major bleeding reported   Vitamin K intake Normally has ~0-1 serving of green, leafy vegetables per month (occasional broccoli or yohan greens)   Recent vomiting/diarrhea/fever, changes in weight or activity level 2/7 - Patient reported having a cold (taking Mucinex)    Tobacco or alcohol use Patient denies both   Upcoming surgeries or procedures None reported     Assessment/Plan:  Patient's INR was therapeutic today (2.5). She denies any missed/extra doses, diet changes, or medication changes since last visit. She does endorse decrease in physical activity due to recent cold as noted above. At initial visit, patient reported she had been taking warfarin 5 mg daily consistently for ~2 years with mostly therapeutic INR levels. However, INR had been trending high a few months prior. Patient was instructed to continue warfarin 2.5 mg on Sun/Tue/Thur, and 5 mg on all other days. Patient was instructed to follow warfarin dosing instructions provided by clinic and not to self-dose warfarin. Repeat INR in 4 weeks. Patient was reminded to maintain consistent vitamin K intake and call with any bleeding, medication changes, or fever/vomiting/diarrhea. Patient understands dosing directions and information discussed. Dosing schedule and follow up appointment given to patient. Progress note routed to referring physician's office. Patient acknowledges working in consult agreement with pharmacist as referred by his/her physician. Next Clinic Appointment:  3/6    Please call [facility] at [phone] with any questions. Thanks!   Sergo Rivera  PharmD Candidate 2020 2/7/2020 1:05 PM

## 2020-03-06 ENCOUNTER — ANTI-COAG VISIT (OUTPATIENT)
Dept: PHARMACY | Age: 80
End: 2020-03-06
Payer: MEDICARE

## 2020-03-06 LAB — INTERNATIONAL NORMALIZATION RATIO, POC: 1.8

## 2020-03-06 PROCEDURE — 85610 PROTHROMBIN TIME: CPT | Performed by: PHARMACIST

## 2020-03-06 PROCEDURE — 99211 OFF/OP EST MAY X REQ PHY/QHP: CPT | Performed by: PHARMACIST

## 2020-03-06 NOTE — PROGRESS NOTES
Vitamin K intake Normally has ~0-1 serving of green, leafy vegetables per month (occasional broccoli or yohan greens)  -3/6: Ate 2 servings of yohan greens a serving of coleslaw this week    Recent vomiting/diarrhea/fever, changes in weight or activity level 2/7 - Patient reported having a cold (taking Mucinex)    Tobacco or alcohol use Patient denies both   Upcoming surgeries or procedures None reported     Assessment/Plan:  Patient's INR was subtherapeutic today (1.8) due to increased vitamin K intake this week. She usually has 0-1 servings of vitamin K foods per week and this week she reports having at least 3. She denies any missed/extra doses or medication changes since last visit. At initial visit, patient reported she had been taking warfarin 5 mg daily consistently for ~2 years with mostly therapeutic INR levels. However, INR had been trending high a few months prior. Patient was instructed to continue warfarin 2.5 mg on Sun/Tue/Thur, and 5 mg on all other days. Patient was instructed to follow warfarin dosing instructions provided by clinic and not to self-dose warfarin. Repeat INR in 4 weeks (per patient request, recommended 3 week follow-up). Patient was reminded to maintain consistent vitamin K intake and call with any bleeding, medication changes, or fever/vomiting/diarrhea. Patient understands dosing directions and information discussed. Dosing schedule and follow up appointment given to patient. Progress note routed to referring physician's office. Patient acknowledges working in consult agreement with pharmacist as referred by his/her physician. Next Clinic Appointment:  4/10    Please call [facility] at [phone] with any questions. Thanks!   Claus Durham, PharmD, BCPS  [facility]  [place]: [phone]  [place]: [phone]  3/6/2020 1:02 PM

## 2020-03-30 ENCOUNTER — TELEPHONE (OUTPATIENT)
Dept: PHARMACY | Age: 80
End: 2020-03-30

## 2020-03-30 NOTE — TELEPHONE ENCOUNTER
Called patient to reschedule clinic visit for INR on 4/3 and notify patient of change to lab draws in effort to minimize risk of exposure and spread of COVID-19. No answer. Will continue attempts to reach patient. Standing order for PT/INR has been placed facilitate transition to remote INR monitoring given efforts to reduce the spread of COVID-19.     CLINICAL PHARMACY CONSULT: MED RECONCILIATION/REVIEW ADDENDUM    For Pharmacy Admin Tracking Only    PHSO: Yes  Total # of Interventions Recommended: 0  - Maintenance Safety Lab Monitoring #: 1  Total Interventions Accepted: 0  Time Spent (min): 5    Jacquelin Teixeira, PharmD

## 2020-04-01 NOTE — TELEPHONE ENCOUNTER
Attempted to reach patient to to r/s INR and notify of clinic closure. Line rang several times; no answer. Will continue attempts to reach.     Avel Mckinnon, PharmD, [facility]  Medication Management Clinic   [place] [id number] Ph: [phone]  [facility] Ph: [phone]  4/1/2020 3:50 PM

## 2020-04-03 ENCOUNTER — ANTI-COAG VISIT (OUTPATIENT)
Dept: PHARMACY | Age: 80
End: 2020-04-03
Payer: MEDICARE

## 2020-04-03 DIAGNOSIS — I26.99 OTHER PULMONARY EMBOLISM WITHOUT ACUTE COR PULMONALE, UNSPECIFIED CHRONICITY (HCC): ICD-10-CM

## 2020-04-03 LAB
INR BLD: 2.21 (ref 0.86–1.14)
PROTHROMBIN TIME: 25.9 SEC (ref 10–13.2)

## 2020-04-03 PROCEDURE — 99211 OFF/OP EST MAY X REQ PHY/QHP: CPT

## 2020-04-03 NOTE — PROGRESS NOTES
ANTICOAGULATION SERVICE    Bairon Cabrales is a 78 y.o. female with PMHx significant for DVT, PE, breast CA who had INR drawn by lab on 4/3/2020 for anticoagulation monitoring and adjustment. 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     Anticoagulation Indication(s):  DVT, PE    Referring Physician:  Dr. Bella Ureña  Goal INR Range:  2-3  Duration of Anticoagulation Therapy:  Indefinite  Time of day dose taken:  AM  Product patient has at home:  warfarin 5 mg (peach)    INR Summary                            Warfarin regimen (mg)  Date INR   A/P    Sun Mon Tue Wed Thu Fri Sat Mg/wk  4/3 2.21 At goal, no change  2.5 5 2.5 5 2.5 5 5 27.5  3/6 1.8 Below goal, continue  2.5 5 2.5 5 2.5 5 5 27.5  2/7 2.5 At goal, no change  2.5 5 2.5 5 2.5 5 5 27.5  1/10 2.2 At goal, no change  2.5 5 2.5 5 2.5 5 5 27.5  12/19 2.2 At goal, no change  2.5 5 2.5 5 2.5 5 5 27.5  12/4 3.4 Above goal, decrease  2.5 5 2.5 5 2.5 5 5 27.5  11/11 4.1 Above goal, hold + dec 2.5 5 0/5 5 2.5 5 5 30  10/25 1.9 Below goal, continue  2.5 5 5 5 5 5 5 32.5  10/21 1.4 Below goal (off warfarin) 2.5 5 5 5 5 5 5 32.5  10/2 2.8 At goal, decrease  2.5 5 5 5 5 5 5 32.5  9/25 5.2 Above goal (extra doses) 5 5 5 5 0/5 0/5 5 35  9/4 3.1 Above goal, continue  5 5 5 5 5 5 5 35  8/1 2.9 Per PCP, no change  5 5 5 5 5 5 5 35    Last CBC:  Lab Results   Component Value Date    RBC 4.09 11/11/2019    HGB 12.5 11/11/2019    HCT 38.0 11/11/2019    MCV 92.9 11/11/2019    MCH 30.7 11/11/2019    MPV 10.4 11/11/2019    RDW 16.2 (H) 11/11/2019     11/11/2019       Patient History:  Recent hospitalizations/HC visits None    Recent medication changes Med rec reviewed at first visit   Medications taken regularly that may interact with warfarin or alter INR No significant drug interactions identified   Warfarin dose taken as prescribed -Yes  -Patient has pillbox, but doesn't currently use it; she has a system for taking medications that works well Signs/symptoms of bleeding No h/o major bleeding reported   Vitamin K intake Normally has ~0-1 serving of green, leafy vegetables per month (occasional broccoli or yohan greens)   Recent vomiting/diarrhea/fever, changes in weight or activity level None reported   Tobacco or alcohol use Patient denies both   Upcoming surgeries or procedures None reported     Assessment/Plan:  Patient's INR was therapeutic today (2.21), and seems to have stabilized on 27.5 mg/week of warfarin. Spoke with patient over the phone. She denies any missed/extra warfarin doses, diet changes, medication changes, illness, or bleeding since last visit. Patient was instructed to continue warfarin 2.5 mg on Sun/Tue/Thur, and 5 mg on all other days. Patient was instructed to follow warfarin dosing instructions provided by clinic and not to self-dose warfarin. Repeat INR in 4 weeks. Patient was reminded to maintain consistent vitamin K intake and call with any bleeding, medication changes, or fever/vomiting/diarrhea. Patient understands dosing directions and information discussed. Dosing schedule and follow up appointment given to patient. Progress note routed to referring physician's office. Patient acknowledges working in consult agreement with pharmacist as referred by his/her physician. Next INR Check:  5/4    Please call [facility] at [phone] with any questions. Thanks! Matt Campos.  Zayda Coto, ManiD, [facility]  [facility]  Ph: [phone]  4/3/2020 3:31 PM     CLINICAL PHARMACY CONSULT: MED RECONCILIATION/REVIEW ADDENDUM    For Pharmacy Admin Tracking Only    PHSO: Yes  Total # of Interventions Recommended: 0  - Maintenance Safety Lab Monitoring #: 1  Total Interventions Accepted: 0  Time Spent (min): 15

## 2020-05-04 DIAGNOSIS — I26.99 OTHER PULMONARY EMBOLISM WITHOUT ACUTE COR PULMONALE, UNSPECIFIED CHRONICITY (HCC): ICD-10-CM

## 2020-05-04 LAB
INR BLD: 2.44 (ref 0.86–1.14)
PROTHROMBIN TIME: 28.6 SEC (ref 10–13.2)

## 2020-05-05 ENCOUNTER — ANTI-COAG VISIT (OUTPATIENT)
Dept: INTERNAL MEDICINE CLINIC | Age: 80
End: 2020-05-05

## 2020-05-05 ENCOUNTER — ANTI-COAG VISIT (OUTPATIENT)
Dept: PHARMACY | Age: 80
End: 2020-05-05
Payer: MEDICARE

## 2020-05-05 PROCEDURE — 99211 OFF/OP EST MAY X REQ PHY/QHP: CPT

## 2020-05-05 NOTE — PROGRESS NOTES
doesn't currently use it; she has a system for taking medications that works well   Signs/symptoms of bleeding No h/o major bleeding reported   Vitamin K intake Normally has ~0-1 serving of green, leafy vegetables per month (occasional broccoli or yohan greens)   Recent vomiting/diarrhea/fever, changes in weight or activity level None reported   Tobacco or alcohol use Patient denies both   Upcoming surgeries or procedures None reported     Assessment/Plan:  Patient's INR was therapeutic today (2.44), and seems to have stabilized on 27.5 mg/week of warfarin. Spoke with patient over the phone. She denies any missed/extra warfarin doses, diet changes, medication changes, illness, or bleeding since last visit. Patient was instructed to continue warfarin 2.5 mg on Sun/Tue/Thur, and 5 mg on all other days. Patient was instructed to follow warfarin dosing instructions provided by clinic and not to self-dose warfarin. Repeat INR in 4 weeks at clinic if open. Patient was reminded to maintain consistent vitamin K intake and call with any bleeding, medication changes, or fever/vomiting/diarrhea. Patient understands dosing directions and information discussed. Dosing schedule and follow up appointment given to patient. Progress note routed to referring physician's office. Patient acknowledges working in consult agreement with pharmacist as referred by his/her physician. Next INR Check:  6/5    Please call [facility] at [phone] with any questions. Thanks!   Padma Mortensen, PharmD, [facility]  Medication Management Clinic   Jenniferекатерина Isakекатерина Vidal Mata3 Ph: [phone]  Armando Graham Ph: [phone]  5/5/2020 1:22 PM      CLINICAL PHARMACY CONSULT: MED RECONCILIATION/REVIEW ADDENDUM    For Pharmacy Admin Tracking Only    PHSO: Yes  Total # of Interventions Recommended: 0  - Maintenance Safety Lab Monitoring #: 1  Total Interventions Accepted: 0  Time Spent (min): 15    Mani LoboD

## 2020-05-29 ENCOUNTER — TELEPHONE (OUTPATIENT)
Dept: PHARMACY | Age: 80
End: 2020-05-29

## 2020-06-05 ENCOUNTER — ANTI-COAG VISIT (OUTPATIENT)
Dept: PHARMACY | Age: 80
End: 2020-06-05
Payer: MEDICARE

## 2020-06-08 ENCOUNTER — ANTI-COAG VISIT (OUTPATIENT)
Dept: INTERNAL MEDICINE CLINIC | Age: 80
End: 2020-06-08

## 2020-06-08 PROCEDURE — 99211 OFF/OP EST MAY X REQ PHY/QHP: CPT

## 2020-06-08 NOTE — PROGRESS NOTES
ANTICOAGULATION SERVICE    Cholo Morelos is a 78 y.o. female with PMHx significant for DVT, PE, breast CA who had INR drawn by lab and reported to clinic on 6/8/2020 for anticoagulation monitoring and adjustment. 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     Anticoagulation Indication(s):  DVT, PE    Referring Physician:  Dr. Perla Bobby  Goal INR Range:  2-3  Duration of Anticoagulation Therapy:  Indefinite  Time of day dose taken:  AM  Product patient has at home:  warfarin 5 mg (peach)    INR Summary                            Warfarin regimen (mg)  Date INR   A/P    Sun Mon Tue Wed Thu Fri Sat Mg/wk  6/5 2.02 At goal, no change  2.5 5 2.5 5 2.5 5 5 27.5  5/4 2.44 At goal, no change  2.5 5 2.5 5 2.5 5 5 27.5  4/3 2.21 At goal, no change  2.5 5 2.5 5 2.5 5 5 27.5  3/6 1.8 Below goal, continue  2.5 5 2.5 5 2.5 5 5 27.5  2/7 2.5 At goal, no change  2.5 5 2.5 5 2.5 5 5 27.5  1/10 2.2 At goal, no change  2.5 5 2.5 5 2.5 5 5 27.5  12/19 2.2 At goal, no change  2.5 5 2.5 5 2.5 5 5 27.5  12/4 3.4 Above goal, decrease  2.5 5 2.5 5 2.5 5 5 27.5  11/11 4.1 Above goal, hold + dec 2.5 5 0/5 5 2.5 5 5 30  10/25 1.9 Below goal, continue  2.5 5 5 5 5 5 5 32.5  10/21 1.4 Below goal (off warfarin) 2.5 5 5 5 5 5 5 32.5  10/2 2.8 At goal, decrease  2.5 5 5 5 5 5 5 32.5  9/25 5.2 Above goal (extra doses) 5 5 5 5 0/5 0/5 5 35  9/4 3.1 Above goal, continue  5 5 5 5 5 5 5 35  8/1 2.9 Per PCP, no change  5 5 5 5 5 5 5 35    Last CBC:  Lab Results   Component Value Date    RBC 4.09 11/11/2019    HGB 12.5 11/11/2019    HCT 38.0 11/11/2019    MCV 92.9 11/11/2019    MCH 30.7 11/11/2019    MPV 10.4 11/11/2019    RDW 16.2 (H) 11/11/2019     11/11/2019       Patient History:  Recent hospitalizations/HC visits None reported   Recent medication changes None reported   Medications taken regularly that may interact with warfarin or alter INR No significant drug interactions identified   Warfarin dose taken as

## 2020-06-11 RX ORDER — AMLODIPINE BESYLATE 5 MG/1
TABLET ORAL
Qty: 90 TABLET | Refills: 0 | Status: SHIPPED | OUTPATIENT
Start: 2020-06-11 | End: 2020-09-09

## 2020-07-02 ENCOUNTER — ANTI-COAG VISIT (OUTPATIENT)
Dept: PHARMACY | Age: 80
End: 2020-07-02
Payer: MEDICARE

## 2020-07-02 LAB — INTERNATIONAL NORMALIZATION RATIO, POC: 2.3

## 2020-07-02 PROCEDURE — 99211 OFF/OP EST MAY X REQ PHY/QHP: CPT

## 2020-07-02 PROCEDURE — 85610 PROTHROMBIN TIME: CPT

## 2020-07-02 NOTE — PROGRESS NOTES
ANTICOAGULATION SERVICE    Leopoldo Eis is a 78 y.o. female with PMHx significant for DVT, PE, breast CA who presents to clinic on 7/2/2020 for anticoagulation monitoring and adjustment. 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     Anticoagulation Indication(s):  DVT, PE    Referring Physician:  Dr. Angel Ba  Goal INR Range:  2-3  Duration of Anticoagulation Therapy:  Indefinite  Time of day dose taken:  AM  Product patient has at home:  warfarin 5 mg (peach)    INR Summary                            Warfarin regimen (mg)  Date INR   A/P    Sun Mon Tue Wed Thu Fri Sat Mg/wk  7/2 2.3 At goal, no change  2.5 5 2.5 5 2.5 5 5 27.5  6/5 2.02 At goal, no change  2.5 5 2.5 5 2.5 5 5 27.5  5/4 2.44 At goal, no change  2.5 5 2.5 5 2.5 5 5 27.5  4/3 2.21 At goal, no change  2.5 5 2.5 5 2.5 5 5 27.5  3/6 1.8 Below goal, continue  2.5 5 2.5 5 2.5 5 5 27.5  2/7 2.5 At goal, no change  2.5 5 2.5 5 2.5 5 5 27.5  1/10 2.2 At goal, no change  2.5 5 2.5 5 2.5 5 5 27.5  12/19 2.2 At goal, no change  2.5 5 2.5 5 2.5 5 5 27.5  12/4 3.4 Above goal, decrease  2.5 5 2.5 5 2.5 5 5 27.5  11/11 4.1 Above goal, hold + dec 2.5 5 0/5 5 2.5 5 5 30  10/25 1.9 Below goal, continue  2.5 5 5 5 5 5 5 32.5  10/21 1.4 Below goal (off warfarin) 2.5 5 5 5 5 5 5 32.5  10/2 2.8 At goal, decrease  2.5 5 5 5 5 5 5 32.5  9/25 5.2 Above goal (extra doses) 5 5 5 5 0/5 0/5 5 35  9/4 3.1 Above goal, continue  5 5 5 5 5 5 5 35  8/1 2.9 Per PCP, no change  5 5 5 5 5 5 5 35    Last CBC:  Lab Results   Component Value Date    RBC 4.09 11/11/2019    HGB 12.5 11/11/2019    HCT 38.0 11/11/2019    MCV 92.9 11/11/2019    MCH 30.7 11/11/2019    MPV 10.4 11/11/2019    RDW 16.2 (H) 11/11/2019     11/11/2019       Patient History:  Recent hospitalizations/HC visits None reported   Recent medication changes None reported   Medications taken regularly that may interact with warfarin or alter INR No significant drug interactions identified Warfarin dose taken as prescribed -Yes  -Patient has pillbox, but doesn't currently use it; she has a system for taking medications that works well   Signs/symptoms of bleeding No h/o major bleeding reported   Vitamin K intake Normally has ~0-1 serving of green, leafy vegetables per month (occasional broccoli or yohan greens)   Recent vomiting/diarrhea/fever, changes in weight or activity level None reported   Tobacco or alcohol use Patient denies both   Upcoming surgeries or procedures None reported     Assessment/Plan:  Patient's INR was therapeutic today (2.3), and seems to have stabilized on 27.5 mg/week of warfarin. She denies any missed/extra warfarin doses, diet changes, medication changes, illness, or bleeding since last visit. Patient was instructed to continue warfarin 2.5 mg on Sun/Tue/Thur, and 5 mg on all other days. Patient was instructed to follow warfarin dosing instructions provided by clinic and not to self-dose warfarin. Repeat INR in 5 weeks. Patient was reminded to maintain consistent vitamin K intake and call with any bleeding, medication changes, or fever/vomiting/diarrhea. Patient understands dosing directions and information discussed. Dosing schedule and follow up appointment given to patient. Progress note routed to referring physician's office. Patient acknowledges working in consult agreement with pharmacist as referred by his/her physician. Next INR Check:  8/5    Please call [facility] at [phone] with any questions. Thanks! Giorgio Sarabia.  Med Fernandes, PharmD, [facility]  [facility]  Ph: [phone]  7/2/2020 1:10 PM    CLINICAL PHARMACY CONSULT: MED RECONCILIATION/REVIEW ADDENDUM    For Pharmacy Admin Tracking Only    PHSO: Yes  Total # of Interventions Recommended: 0  - Maintenance Safety Lab Monitoring #: 1  Total Interventions Accepted: 0  Time Spent (min): 15

## 2020-07-27 ENCOUNTER — OFFICE VISIT (OUTPATIENT)
Dept: INTERNAL MEDICINE CLINIC | Age: 80
End: 2020-07-27
Payer: MEDICARE

## 2020-07-27 VITALS
TEMPERATURE: 97.1 F | DIASTOLIC BLOOD PRESSURE: 80 MMHG | BODY MASS INDEX: 41.41 KG/M2 | WEIGHT: 225 LBS | HEIGHT: 62 IN | SYSTOLIC BLOOD PRESSURE: 130 MMHG

## 2020-07-27 PROBLEM — E66.01 MORBIDLY OBESE (HCC): Status: ACTIVE | Noted: 2020-07-27

## 2020-07-27 PROCEDURE — 3288F FALL RISK ASSESSMENT DOCD: CPT | Performed by: INTERNAL MEDICINE

## 2020-07-27 PROCEDURE — G8510 SCR DEP NEG, NO PLAN REQD: HCPCS | Performed by: INTERNAL MEDICINE

## 2020-07-27 PROCEDURE — 4040F PNEUMOC VAC/ADMIN/RCVD: CPT | Performed by: INTERNAL MEDICINE

## 2020-07-27 PROCEDURE — G8399 PT W/DXA RESULTS DOCUMENT: HCPCS | Performed by: INTERNAL MEDICINE

## 2020-07-27 PROCEDURE — G8427 DOCREV CUR MEDS BY ELIG CLIN: HCPCS | Performed by: INTERNAL MEDICINE

## 2020-07-27 PROCEDURE — 1090F PRES/ABSN URINE INCON ASSESS: CPT | Performed by: INTERNAL MEDICINE

## 2020-07-27 PROCEDURE — 1123F ACP DISCUSS/DSCN MKR DOCD: CPT | Performed by: INTERNAL MEDICINE

## 2020-07-27 PROCEDURE — G8417 CALC BMI ABV UP PARAM F/U: HCPCS | Performed by: INTERNAL MEDICINE

## 2020-07-27 PROCEDURE — 1036F TOBACCO NON-USER: CPT | Performed by: INTERNAL MEDICINE

## 2020-07-27 PROCEDURE — 99213 OFFICE O/P EST LOW 20 MIN: CPT | Performed by: INTERNAL MEDICINE

## 2020-07-27 ASSESSMENT — PATIENT HEALTH QUESTIONNAIRE - PHQ9
SUM OF ALL RESPONSES TO PHQ QUESTIONS 1-9: 0
SUM OF ALL RESPONSES TO PHQ9 QUESTIONS 1 & 2: 0
2. FEELING DOWN, DEPRESSED OR HOPELESS: 0
1. LITTLE INTEREST OR PLEASURE IN DOING THINGS: 0
SUM OF ALL RESPONSES TO PHQ QUESTIONS 1-9: 0
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
## 2020-08-05 ENCOUNTER — ANTI-COAG VISIT (OUTPATIENT)
Dept: PHARMACY | Age: 80
End: 2020-08-05
Payer: MEDICARE

## 2020-08-05 LAB — INTERNATIONAL NORMALIZATION RATIO, POC: 2.8

## 2020-08-05 PROCEDURE — 99211 OFF/OP EST MAY X REQ PHY/QHP: CPT

## 2020-08-05 PROCEDURE — 85610 PROTHROMBIN TIME: CPT

## 2020-08-05 NOTE — PROGRESS NOTES
ANTICOAGULATION SERVICE    Pink Rinne is a 78 y.o. female with PMHx significant for DVT, PE, breast CA who presents to clinic on 8/5/2020 for anticoagulation monitoring and adjustment. 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     Anticoagulation Indication(s):  DVT, PE    Referring Physician:  Dr. Rafal Valentine  Goal INR Range:  2-3  Duration of Anticoagulation Therapy:  Indefinite  Time of day dose taken:  AM  Product patient has at home:  warfarin 5 mg (peach)    INR Summary                            Warfarin regimen (mg)  Date INR   A/P    Sun Mon Tue Wed Thu Fri Sat Mg/wk  8/5 2.8 At goal, no change  2.5 5 2.5 5 2.5 5 5 27.5  7/2 2.3 At goal, no change  2.5 5 2.5 5 2.5 5 5 27.5  6/5 2.02 At goal, no change  2.5 5 2.5 5 2.5 5 5 27.5  5/4 2.44 At goal, no change  2.5 5 2.5 5 2.5 5 5 27.5  4/3 2.21 At goal, no change  2.5 5 2.5 5 2.5 5 5 27.5  3/6 1.8 Below goal, continue  2.5 5 2.5 5 2.5 5 5 27.5  2/7 2.5 At goal, no change  2.5 5 2.5 5 2.5 5 5 27.5  1/10 2.2 At goal, no change  2.5 5 2.5 5 2.5 5 5 27.5  12/19 2.2 At goal, no change  2.5 5 2.5 5 2.5 5 5 27.5  12/4 3.4 Above goal, decrease  2.5 5 2.5 5 2.5 5 5 27.5  11/11 4.1 Above goal, hold + dec 2.5 5 0/5 5 2.5 5 5 30  10/25 1.9 Below goal, continue  2.5 5 5 5 5 5 5 32.5  10/21 1.4 Below goal (off warfarin) 2.5 5 5 5 5 5 5 32.5  10/2 2.8 At goal, decrease  2.5 5 5 5 5 5 5 32.5  9/25 5.2 Above goal (extra doses) 5 5 5 5 0/5 0/5 5 35  9/4 3.1 Above goal, continue  5 5 5 5 5 5 5 35  8/1 2.9 Per PCP, no change  5 5 5 5 5 5 5 35    Last CBC:  Lab Results   Component Value Date    RBC 4.09 11/11/2019    HGB 12.5 11/11/2019    HCT 38.0 11/11/2019    MCV 92.9 11/11/2019    MCH 30.7 11/11/2019    MPV 10.4 11/11/2019    RDW 16.2 (H) 11/11/2019     11/11/2019       Patient History:  Recent hospitalizations/HC visits None reported   Recent medication changes None reported   Medications taken regularly that may interact with warfarin or alter INR No significant drug interactions identified   Warfarin dose taken as prescribed -Yes  -Patient has pillbox, but doesn't currently use it; she has a system for taking medications that works well   Signs/symptoms of bleeding No h/o major bleeding reported   Vitamin K intake Normally has ~0-1 serving of green, leafy vegetables per month (occasional broccoli or yohan greens)   Recent vomiting/diarrhea/fever, changes in weight or activity level None reported   Tobacco or alcohol use Patient denies both   Upcoming surgeries or procedures None reported     Assessment/Plan:  Patient's INR was therapeutic today (2.8), and seems to have stabilized on 27.5 mg/week of warfarin. She denies any missed/extra warfarin doses, diet changes, medication changes, illness, or bleeding since last visit. Patient was instructed to continue warfarin 2.5 mg on Sun/Tue/Thur, and 5 mg on all other days. Patient was instructed to follow warfarin dosing instructions provided by clinic and not to self-dose warfarin. Repeat INR in 5 weeks. Patient was reminded to maintain consistent vitamin K intake and call with any bleeding, medication changes, or fever/vomiting/diarrhea. Patient understands dosing directions and information discussed. Dosing schedule and follow up appointment given to patient. Progress note routed to referring physician's office. Patient acknowledges working in consult agreement with pharmacist as referred by his/her physician. Next INR Check:  9/9    Please call [facility] at [phone] with any questions. Thanks!   Paz Bear, ManiD  PGY1 Pharmacy Resident  [facility]  Ph: [phone]  8/5/2020 2:03 PM    CLINICAL PHARMACY CONSULT: MED RECONCILIATION/REVIEW ADDENDUM    For Pharmacy Admin Tracking Only    PHSO: Yes  Total # of Interventions Recommended: 0  - Maintenance Safety Lab Monitoring #: 1  Total Interventions Accepted: 0  Time Spent (min): 15

## 2020-08-10 ENCOUNTER — TELEPHONE (OUTPATIENT)
Dept: INTERNAL MEDICINE CLINIC | Age: 80
End: 2020-08-10

## 2020-08-13 NOTE — TELEPHONE ENCOUNTER
[address] called to request a prescription for a four wheel walker. Please fax to [phone]. Please advise.
Script faxed.
Home

## 2020-08-27 ENCOUNTER — OFFICE VISIT (OUTPATIENT)
Dept: INTERNAL MEDICINE CLINIC | Age: 80
End: 2020-08-27
Payer: MEDICARE

## 2020-08-27 VITALS
BODY MASS INDEX: 41.41 KG/M2 | HEIGHT: 62 IN | TEMPERATURE: 97.8 F | SYSTOLIC BLOOD PRESSURE: 145 MMHG | WEIGHT: 225 LBS | HEART RATE: 72 BPM | DIASTOLIC BLOOD PRESSURE: 73 MMHG

## 2020-08-27 PROCEDURE — G8399 PT W/DXA RESULTS DOCUMENT: HCPCS | Performed by: INTERNAL MEDICINE

## 2020-08-27 PROCEDURE — 93000 ELECTROCARDIOGRAM COMPLETE: CPT | Performed by: INTERNAL MEDICINE

## 2020-08-27 PROCEDURE — 99213 OFFICE O/P EST LOW 20 MIN: CPT | Performed by: INTERNAL MEDICINE

## 2020-08-27 PROCEDURE — 1036F TOBACCO NON-USER: CPT | Performed by: INTERNAL MEDICINE

## 2020-08-27 PROCEDURE — 1123F ACP DISCUSS/DSCN MKR DOCD: CPT | Performed by: INTERNAL MEDICINE

## 2020-08-27 PROCEDURE — 90653 IIV ADJUVANT VACCINE IM: CPT | Performed by: INTERNAL MEDICINE

## 2020-08-27 PROCEDURE — G8427 DOCREV CUR MEDS BY ELIG CLIN: HCPCS | Performed by: INTERNAL MEDICINE

## 2020-08-27 PROCEDURE — G8417 CALC BMI ABV UP PARAM F/U: HCPCS | Performed by: INTERNAL MEDICINE

## 2020-08-27 PROCEDURE — G0008 ADMIN INFLUENZA VIRUS VAC: HCPCS | Performed by: INTERNAL MEDICINE

## 2020-08-27 PROCEDURE — 1090F PRES/ABSN URINE INCON ASSESS: CPT | Performed by: INTERNAL MEDICINE

## 2020-08-27 PROCEDURE — 4040F PNEUMOC VAC/ADMIN/RCVD: CPT | Performed by: INTERNAL MEDICINE

## 2020-08-27 NOTE — PROGRESS NOTES
Preoperative Consultation      Isadora Lubin  YOB: 1940    Date of Service:  8/27/2020    Chief Complaint   Patient presents with   [place]      Dr. Gus Francisco, right eye,09/03/20       This patient presents to the office today for a preoperative consultation at the request of surgeon, Dr. Soheila Hendrickson   To get surgery of Rt eye for lens implant  Planned anesthesia: Regional  Known anesthesia problems: None  Bleeding risk:  Increased, on coumadin  Personal or FH of DVT/PE:  Yes on coumadion  Patient objection to receiving blood products: No    Past Medical History:   Diagnosis Date    Abscess of anal and rectal regions     Breast cancer ([facility] 8/25/2014    Cancer ([facility]     breast    DJD (degenerative joint disease)     GERD (gastroesophageal reflux disease)     History of blood transfusion     Hx of blood clots     Hypertension     Long term (current) use of anticoagulants     Obesity     PE (pulmonary embolism) 2/23/2012    Personal history of venous thrombosis and embolism     Recurrent hernia        Past Surgical History:   Procedure Laterality Date    BREAST LUMPECTOMY      RIGHT    COLONOSCOPY      EYE SURGERY      CATARACTS    OTHER SURGICAL HISTORY N/A 06/06/2017    LAPAROSCOPIC VENTRAL HERNIA REPAIR        MD MASTECTOMY, PARTIAL Left 8/24/2018    MAMMOGRAM GUIDED NEEDLE LOCALIZED, LEFT BREAST LUMPECTOMY WITH LEFT BREAST SENTINEL NODE BIOPSY WITH RADIOISOTOPE AND GAMMA PROBE performed by Jocelynn Daniels MD at [place] [address]         Allergies   Allergen Reactions    Cipro Xr Itching    Penicillins Itching    Vicodin [Hydrocodone-Acetaminophen] Itching       Outpatient Medications Marked as Taking for the 8/27/20 encounter (Office Visit) with Carmela Koehler MD   Medication Sig Dispense Refill    amLODIPine (NORVASC) 5 MG tablet TAKE ONE TABLET BY MOUTH DAILY 90 tablet 0    warfarin (COUMADIN) 5 MG tablet TAKE ONE AND ONE-HALF TABLET BY MOUTH DAILY FOR 5 DAYS OUT OF THE WEEK AND TAKE ONE TABLET BY MOUTH DAILY THE OTHER 2 DAYS 100 tablet 2    brimonidine (ALPHAGAN P) 0.1 % SOLN Place 1 drop into the right eye every 8 hours Indications: patient taking BID       dorzolamide (TRUSOPT) 2 % ophthalmic solution Place 2 drops into both eyes 3 times daily Indications: patient taking BID       docusate sodium (COLACE) 100 MG capsule Take 1 capsule by mouth 2 times daily (Patient taking differently: Take 100 mg by mouth 2 times daily as needed ) 30 capsule 0    acetaminophen (TYLENOL) 650 MG extended release tablet Take 2 tablets by mouth every 8 hours as needed for Pain 60 tablet 3    ascorbic acid (VITAMIN C) 500 MG tablet Take 500 mg by mouth daily.  vitamin D (CHOLECALCIFEROL) 400 UNITS TABS tablet Take 400 Units by mouth daily.  ferrous sulfate 325 (65 FE) MG tablet Take 325 mg by mouth daily (with breakfast). Family History   Problem Relation Age of Onset    Alcohol Abuse Father     Alcohol Abuse Paternal Uncle     Cancer Mother 80        throat    Cancer Brother 76        throat, smoker       Social History     Socioeconomic History    Marital status:       Spouse name: Not on file    Number of children: Not on file    Years of education: Not on file    Highest education level: Not on file   Occupational History    Not on file   Social Needs    Financial resource strain: Not on file    Food insecurity     Worry: Not on file     Inability: Not on file    Transportation needs     Medical: Not on file     Non-medical: Not on file   Tobacco Use    Smoking status: Never Smoker    Smokeless tobacco: Never Used   Substance and Sexual Activity    Alcohol use: No    Drug use: No    Sexual activity: Not on file   Lifestyle    Physical activity     Days per week: Not on file     Minutes per session: Not on file    Stress: Not on file   Relationships    Social connections     Talks on phone: Not on file     Gets together: Not on file     Attends [employer] service: Not on file     Active member of club or organization: Not on file     Attends meetings of clubs or organizations: Not on file     Relationship status: Not on file    Intimate partner violence     Fear of current or ex partner: Not on file     Emotionally abused: Not on file     Physically abused: Not on file     Forced sexual activity: Not on file   Other Topics Concern    Not on file   Social History Narrative    Not on file       ROS:    Constitutional:  Denies fever or chills   Eyes:  Denies change in visual acuity   HENT:  Denies nasal congestion or sore throat   Respiratory:  Denies cough or shortness of breath   Cardiovascular:  Denies chest pain or edema   GI:  Denies abdominal pain, nausea, vomiting, bloody stools or diarrhea   :  Denies dysuria   Musculoskeletal:  Denies back pain or joint pain   Integument:  Denies rash   Neurologic:  Denies headache, focal weakness or sensory changes   Endocrine:  Denies polyuria or polydipsia   Lymphatic:  Denies swollen glands   Psychiatric:  Denies depression or anxiety       Physical Exam:    Vitals:    08/27/20 1040   BP: (!) 145/73   Pulse: 72   Temp: 97.8 °F (36.6 °C)   Weight: 225 lb (102.1 kg)   Height: 5' 2\" (1.575 m)      Wt Readings from Last 2 Encounters:   08/27/20 225 lb (102.1 kg)   07/27/20 225 lb (102.1 kg)     BP Readings from Last 3 Encounters:   08/27/20 (!) 145/73   07/27/20 130/80   11/11/19 122/76        Constitutional:  Well developed, well nourished, no acute distress, non-toxic appearance   Eyes:  PERRL, conjunctiva normal   HENT:  Atraumatic, external ears normal, nose normal, oropharynx moist, no pharyngeal exudates.  Neck- normal range of motion, no tenderness, supple   Respiratory:  No respiratory distress, normal breath sounds, no rales, no wheezing   Cardiovascular:  Normal rate, normal rhythm, no murmurs, no gallops, no rubs   GI:  Soft, nondistended, normal bowel sounds, nontender, no organomegaly, no mass, no rebound, no guarding   :  No costovertebral angle tenderness   Musculoskeletal:  No edema, no tenderness, no deformities. Back- no tenderness  Integument:  Well hydrated, no rash   Lymphatic:  No lymphadenopathy noted   Neurologic:  Alert & oriented x 3, CN 2-12 normal, normal motor function, normal sensory function, no focal deficits noted   Psychiatric:  Speech and behavior appropriate          Assessment:       78 y.o. patient with planned surgery as above. Known risk factors for perioperative complications: chronci coumadin for hx of massive PE  No contraindications to planned surgery  Pre-Operative Risk assessment using 2014 ACC/AHA guidelines     Emergent procedure No  Active Cardiac Condition No (decompensated HF, Arrhythmia, MI <3 weeks, severe valve disease)  Risk Level of Procedure Low Risk (endoscopy, superficial skin, breast, ambulatory, or cataract, etc.)  Revised Cardiac Risk Index Risk factors: None  Measurement of Exercise Tolerance before Surgery >4 Yes    According to the 2014 ACC/AHA pre-operative risk assessment guidelines Nicole Bowman is a low risk for major cardiac complications during a low risk procedure and may continue as planned. Specific medication recommendations are listed below. Medications recommended to continue should be taken with a sip of water even when NPO. Further recommendations from consultants: None    Medication Recommendations:  Coumadin Should be continued the day of surgery without interruption      Plan:     1. Preoperative workup as follows: History and physical,EKG  2. Change in medication regimen before surgery:None  3. Prophylaxis for cardiac events with perioperative beta-blockers:  Not indicated  4.  Deep vein thrombosis prophylaxis:  Early ambulation  Naz Louis

## 2020-08-27 NOTE — PROGRESS NOTES
Vaccine Information Sheet, \"Influenza - Inactivated\"  given to Toño Louis, or parent/legal guardian of  Toño Louis and verbalized understanding. Patient responses:    Have you ever had a reaction to a flu vaccine? No  Do you have any current illness? No  Have you ever had Guillian Rochester Syndrome? No  Do you have a serious allergy to any of the follow: Neomycin, Polymyxin, Thimerosal, eggs or egg products? No    Flu vaccine given per order. Please see immunization tab. Risks and benefits explained. Current VIS given.

## 2020-09-09 ENCOUNTER — ANTI-COAG VISIT (OUTPATIENT)
Dept: PHARMACY | Age: 80
End: 2020-09-09
Payer: MEDICARE

## 2020-09-09 LAB — INTERNATIONAL NORMALIZATION RATIO, POC: 2.8

## 2020-09-09 PROCEDURE — 85610 PROTHROMBIN TIME: CPT

## 2020-09-09 PROCEDURE — 99211 OFF/OP EST MAY X REQ PHY/QHP: CPT

## 2020-09-09 RX ORDER — AMLODIPINE BESYLATE 5 MG/1
TABLET ORAL
Qty: 90 TABLET | Refills: 2 | Status: SHIPPED | OUTPATIENT
Start: 2020-09-09 | End: 2021-06-04

## 2020-09-09 NOTE — PROGRESS NOTES
taken regularly that may interact with warfarin or alter INR No significant drug interactions identified   Warfarin dose taken as prescribed -Yes  -Patient has pillbox, but doesn't currently use it; she has a system for taking medications that works well   Signs/symptoms of bleeding No h/o major bleeding reported   Vitamin K intake Normally has ~0-1 serving of green, leafy vegetables per month (occasional broccoli or yohan greens)   Recent vomiting/diarrhea/fever, changes in weight or activity level None reported   Tobacco or alcohol use Patient denies both   Upcoming surgeries or procedures None reported     Assessment/Plan:  Patient's INR was therapeutic today (2.8), and seems to have stabilized on 27.5 mg/week of warfarin. She denies any missed/extra warfarin doses, diet changes, medication changes, illness, or bleeding since last visit. She had eye surgery last week, but warfarin was not held and no medication changes were made other than the addition of eye drops, which would not have an impact on INR. Patient was instructed to continue warfarin 2.5 mg on Sun/Tue/Thur, and 5 mg on all other days. Patient was instructed to follow warfarin dosing instructions provided by clinic and not to self-dose warfarin. Repeat INR in 6 weeks. Patient was reminded to maintain consistent vitamin K intake and call with any bleeding, medication changes, or fever/vomiting/diarrhea. Patient understands dosing directions and information discussed. Dosing schedule and follow up appointment given to patient. Progress note routed to referring physician's office. Patient acknowledges working in consult agreement with pharmacist as referred by his/her physician. Next INR Check:  10/21    Please call [facility] at [phone] with any questions. Thanks!   Bairon Patel, PharmD  PGY1 Pharmacy Resident   Wireless: [phone]  9/9/2020 1:49 PM    CLINICAL PHARMACY CONSULT: MED RECONCILIATION/REVIEW ADDENDUM    For Pharmacy Admin Tracking Only    PHSO: Yes  Total # of Interventions Recommended: 0  - Maintenance Safety Lab Monitoring #: 1  Total Interventions Accepted: 0  Time Spent (min): 15

## 2020-09-17 ENCOUNTER — TELEPHONE (OUTPATIENT)
Dept: INTERNAL MEDICINE CLINIC | Age: 80
End: 2020-09-17

## 2020-09-17 NOTE — TELEPHONE ENCOUNTER
Patient states she has been having pulled muscle like back pain for the past three days and would like to know what she should do. [address] [place] [address] Drive [address] [facility] [phone] - F [phone]     Please advise.

## 2020-10-20 RX ORDER — WARFARIN SODIUM 5 MG/1
TABLET ORAL
Qty: 100 TABLET | Refills: 1 | Status: SHIPPED | OUTPATIENT
Start: 2020-10-20 | End: 2021-07-07 | Stop reason: SDUPTHER

## 2020-10-21 ENCOUNTER — ANTI-COAG VISIT (OUTPATIENT)
Dept: PHARMACY | Age: 80
End: 2020-10-21
Payer: MEDICARE

## 2020-10-21 LAB — INTERNATIONAL NORMALIZATION RATIO, POC: 3

## 2020-10-21 PROCEDURE — 85610 PROTHROMBIN TIME: CPT

## 2020-10-21 PROCEDURE — 99211 OFF/OP EST MAY X REQ PHY/QHP: CPT

## 2020-10-21 NOTE — PROGRESS NOTES
ANTICOAGULATION SERVICE    Kizzy Mckenna is a 78 y.o. female with PMHx significant for DVT, PE, breast CA who presents to clinic on 10/21/2020 for anticoagulation monitoring and adjustment. 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     Anticoagulation Indication(s):  DVT, PE    Referring Physician:  Dr. Francie Virk  Goal INR Range:  2-3  Duration of Anticoagulation Therapy:  Indefinite  Time of day dose taken:  AM  Product patient has at home:  warfarin 5 mg (peach)    INR Summary                            Warfarin regimen (mg)  Date INR   A/P    Sun Mon Tue Wed Thu Fri Sat Mg/wk  10/21 3.0 At goal, no change  2.5 5 2.5 5 2.5 5 5 27.5  9/9 2.8 At goal, no change  2.5 5 2.5 5 2.5 5 5 27.5  8/5 2.8 At goal, no change  2.5 5 2.5 5 2.5 5 5 27.5  7/2 2.3 At goal, no change  2.5 5 2.5 5 2.5 5 5 27.5  6/5 2.02 At goal, no change  2.5 5 2.5 5 2.5 5 5 27.5  5/4 2.44 At goal, no change  2.5 5 2.5 5 2.5 5 5 27.5  4/3 2.21 At goal, no change  2.5 5 2.5 5 2.5 5 5 27.5  3/6 1.8 Below goal, continue  2.5 5 2.5 5 2.5 5 5 27.5  2/7 2.5 At goal, no change  2.5 5 2.5 5 2.5 5 5 27.5  1/10 2.2 At goal, no change  2.5 5 2.5 5 2.5 5 5 27.5  12/19 2.2 At goal, no change  2.5 5 2.5 5 2.5 5 5 27.5  12/4 3.4 Above goal, decrease  2.5 5 2.5 5 2.5 5 5 27.5  11/11 4.1 Above goal, hold + dec 2.5 5 0/5 5 2.5 5 5 30  10/25 1.9 Below goal, continue  2.5 5 5 5 5 5 5 32.5  10/21 1.4 Below goal (off warfarin) 2.5 5 5 5 5 5 5 32.5  10/2 2.8 At goal, decrease  2.5 5 5 5 5 5 5 32.5  9/25 5.2 Above goal (extra doses) 5 5 5 5 0/5 0/5 5 35  9/4 3.1 Above goal, continue  5 5 5 5 5 5 5 35  8/1 2.9 Per PCP, no change  5 5 5 5 5 5 5 35    Last CBC:  Lab Results   Component Value Date    RBC 4.09 11/11/2019    HGB 12.5 11/11/2019    HCT 38.0 11/11/2019    MCV 92.9 11/11/2019    MCH 30.7 11/11/2019    MPV 10.4 11/11/2019    RDW 16.2 (H) 11/11/2019     11/11/2019       Patient History:  Recent hospitalizations/HC visits None reported Recent medication changes None reported   Medications taken regularly that may interact with warfarin or alter INR No significant drug interactions identified   Warfarin dose taken as prescribed -Yes  -Patient has pillbox, but doesn't currently use it; she has a system for taking medications that works well   Signs/symptoms of bleeding No h/o major bleeding reported   Vitamin K intake Normally has ~0-1 serving of green, leafy vegetables per month (occasional broccoli or yohan greens)   Recent vomiting/diarrhea/fever, changes in weight or activity level None reported   Tobacco or alcohol use Patient denies both   Upcoming surgeries or procedures None reported     Assessment/Plan:  Patient's INR was therapeutic today (3.0), and has been stabilized on 27.5 mg/week of warfarin for almost a year. She denies any missed/extra warfarin doses, diet changes, medication changes, illness, or bleeding since last visit. Patient was instructed to continue warfarin 2.5 mg on Sun/Tue/Thur, and 5 mg on all other days. Patient was instructed to follow warfarin dosing instructions provided by clinic and not to self-dose warfarin. Repeat INR in 6 weeks. Patient was reminded to maintain consistent vitamin K intake and call with any bleeding, medication changes, or fever/vomiting/diarrhea. Patient understands dosing directions and information discussed. Dosing schedule and follow up appointment given to patient. Progress note routed to referring physician's office. Patient acknowledges working in consult agreement with pharmacist as referred by his/her physician. Next INR Check:  12/2    Please call [facility] at [phone] with any questions. Thanks!   Anel Greenberg, PharmD  PGY1 Pharmacy Resident   Wireless: [phone]  10/21/2020 12:59 PM    CLINICAL PHARMACY CONSULT: MED RECONCILIATION/REVIEW ADDENDUM    For Pharmacy Admin Tracking Only    PHSO: Yes  Total # of Interventions Recommended: 0  - Maintenance Safety Lab Monitoring #: 1  Total Interventions Accepted: 0  Time Spent (min): 15

## 2020-12-02 ENCOUNTER — ANTI-COAG VISIT (OUTPATIENT)
Dept: PHARMACY | Age: 80
End: 2020-12-02
Payer: MEDICARE

## 2020-12-02 LAB — INTERNATIONAL NORMALIZATION RATIO, POC: 2.3

## 2020-12-02 PROCEDURE — 85610 PROTHROMBIN TIME: CPT

## 2020-12-02 PROCEDURE — 99211 OFF/OP EST MAY X REQ PHY/QHP: CPT

## 2020-12-02 NOTE — PROGRESS NOTES
ANTICOAGULATION SERVICE    Yuval Reyes is a 78 y.o. female with PMHx significant for DVT, PE, breast CA who presents to clinic on 12/2/2020 for anticoagulation monitoring and adjustment. 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     Anticoagulation Indication(s):  DVT, PE    Referring Physician:  Dr. Hali Moran  Goal INR Range:  2-3  Duration of Anticoagulation Therapy:  Indefinite  Time of day dose taken:  AM  Product patient has at home:  warfarin 5 mg (peach)    INR Summary                            Warfarin regimen (mg)  Date INR   A/P    Sun Mon Tue Wed Thu Fri Sat Mg/wk  12/2 2.3 At goal, no change  2.5 5 2.5 5 2.5 5 5 27.5  10/21 3.0 At goal, no change  2.5 5 2.5 5 2.5 5 5 27.5  9/9 2.8 At goal, no change  2.5 5 2.5 5 2.5 5 5 27.5  8/5 2.8 At goal, no change  2.5 5 2.5 5 2.5 5 5 27.5  7/2 2.3 At goal, no change  2.5 5 2.5 5 2.5 5 5 27.5  6/5 2.02 At goal, no change  2.5 5 2.5 5 2.5 5 5 27.5  5/4 2.44 At goal, no change  2.5 5 2.5 5 2.5 5 5 27.5  4/3 2.21 At goal, no change  2.5 5 2.5 5 2.5 5 5 27.5  3/6 1.8 Below goal, continue  2.5 5 2.5 5 2.5 5 5 27.5  2/7 2.5 At goal, no change  2.5 5 2.5 5 2.5 5 5 27.5  1/10 2.2 At goal, no change  2.5 5 2.5 5 2.5 5 5 27.5  12/19 2.2 At goal, no change  2.5 5 2.5 5 2.5 5 5 27.5  12/4 3.4 Above goal, decrease  2.5 5 2.5 5 2.5 5 5 27.5  11/11 4.1 Above goal, hold + dec 2.5 5 0/5 5 2.5 5 5 30  10/25 1.9 Below goal, continue  2.5 5 5 5 5 5 5 32.5  10/21 1.4 Below goal (off warfarin) 2.5 5 5 5 5 5 5 32.5  10/2 2.8 At goal, decrease  2.5 5 5 5 5 5 5 32.5  9/25 5.2 Above goal (extra doses) 5 5 5 5 0/5 0/5 5 35  9/4 3.1 Above goal, continue  5 5 5 5 5 5 5 35  8/1 2.9 Per PCP, no change  5 5 5 5 5 5 5 35    Last CBC:  Lab Results   Component Value Date    RBC 4.09 11/11/2019    HGB 12.5 11/11/2019    HCT 38.0 11/11/2019    MCV 92.9 11/11/2019    MCH 30.7 11/11/2019    MPV 10.4 11/11/2019    RDW 16.2 (H) 11/11/2019     11/11/2019       Patient History:  Recent hospitalizations/HC visits None reported   Recent medication changes None reported   Medications taken regularly that may interact with warfarin or alter INR No significant drug interactions identified   Warfarin dose taken as prescribed -Yes  -Patient has pillbox, but doesn't currently use it; she has a system for taking medications that works well   Signs/symptoms of bleeding No h/o major bleeding reported   Vitamin K intake Normally has ~0-1 serving of green, leafy vegetables per month (occasional broccoli or yohan greens)   Recent vomiting/diarrhea/fever, changes in weight or activity level None reported   Tobacco or alcohol use Patient denies both   Upcoming surgeries or procedures None reported     Assessment/Plan:  Patient's INR was therapeutic today (2.3), and has been stabilized on 27.5 mg/week of warfarin for almost a year. She denies any missed/extra warfarin doses, diet changes, medication changes, illness, or bleeding since last visit. Patient was instructed to continue warfarin 2.5 mg on Sun/Tue/Thur, and 5 mg on all other days. Patient was instructed to follow warfarin dosing instructions provided by clinic and not to self-dose warfarin. Repeat INR in 6 weeks. Patient was reminded to maintain consistent vitamin K intake and call with any bleeding, medication changes, or fever/vomiting/diarrhea. Patient understands dosing directions and information discussed. Dosing schedule and follow up appointment given to patient. Progress note routed to referring physician's office. Patient acknowledges working in consult agreement with pharmacist as referred by his/her physician. Next INR Check:  1/13    Please call [facility] at [phone] with any questions. Thanks!   Liat Simpson PharmD  PGY1 Pharmacy Resident   Wireless: [phone]  12/2/2020 12:57 PM    CLINICAL PHARMACY CONSULT: MED RECONCILIATION/REVIEW ADDENDUM    For Pharmacy Admin Tracking Only    PHSO: Yes  Total # of Interventions Recommended: 0  - Maintenance Safety Lab Monitoring #: 1  Total Interventions Accepted: 0  Time Spent (min): 15

## 2021-01-13 ENCOUNTER — ANTI-COAG VISIT (OUTPATIENT)
Dept: PHARMACY | Age: 81
End: 2021-01-13
Payer: MEDICARE

## 2021-01-13 LAB — INTERNATIONAL NORMALIZATION RATIO, POC: 2.8

## 2021-01-13 PROCEDURE — 85610 PROTHROMBIN TIME: CPT

## 2021-01-13 PROCEDURE — 99211 OFF/OP EST MAY X REQ PHY/QHP: CPT

## 2021-02-24 ENCOUNTER — ANTI-COAG VISIT (OUTPATIENT)
Dept: PHARMACY | Age: 81
End: 2021-02-24
Payer: MEDICARE

## 2021-02-24 LAB — INTERNATIONAL NORMALIZATION RATIO, POC: 1.9

## 2021-02-24 PROCEDURE — 85610 PROTHROMBIN TIME: CPT

## 2021-02-24 PROCEDURE — 99211 OFF/OP EST MAY X REQ PHY/QHP: CPT

## 2021-02-24 NOTE — PROGRESS NOTES
ANTICOAGULATION SERVICE    Betsy Herrera is a 78 y.o. female with PMHx significant for DVT, PE, breast CA who presents to clinic on 2/24/2021 for anticoagulation monitoring and adjustment. 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     Anticoagulation Indication(s):  DVT, PE    Referring Physician:  Dr. Brinda Gotti  Goal INR Range:  2-3  Duration of Anticoagulation Therapy:  Indefinite  Time of day dose taken:  AM  Product patient has at home:  warfarin 5 mg (peach)    INR Summary                            Warfarin regimen (mg)  Date INR   A/P    Sun Mon Tue Wed Thu Fri Sat Mg/wk  2/24 1.9 Below goal, continue  2.5 5 2.5 5 2.5 5 5 27.5  1/13 2.8 At goal, no change  2.5 5 2.5 5 2.5 5 5 27.5  12/2 2.3 At goal, no change  2.5 5 2.5 5 2.5 5 5 27.5  10/21 3.0 At goal, no change  2.5 5 2.5 5 2.5 5 5 27.5  9/9 2.8 At goal, no change  2.5 5 2.5 5 2.5 5 5 27.5  8/5 2.8 At goal, no change  2.5 5 2.5 5 2.5 5 5 27.5  7/2 2.3 At goal, no change  2.5 5 2.5 5 2.5 5 5 27.5  6/5 2.02 At goal, no change  2.5 5 2.5 5 2.5 5 5 27.5  5/4 2.44 At goal, no change  2.5 5 2.5 5 2.5 5 5 27.5  4/3 2.21 At goal, no change  2.5 5 2.5 5 2.5 5 5 27.5  3/6 1.8 Below goal, continue  2.5 5 2.5 5 2.5 5 5 27.5  2/7 2.5 At goal, no change  2.5 5 2.5 5 2.5 5 5 27.5  1/10 2.2 At goal, no change  2.5 5 2.5 5 2.5 5 5 27.5  12/19 2.2 At goal, no change  2.5 5 2.5 5 2.5 5 5 27.5    Last CBC:  Lab Results   Component Value Date    RBC 4.09 11/11/2019    HGB 12.5 11/11/2019    HCT 38.0 11/11/2019    MCV 92.9 11/11/2019    MCH 30.7 11/11/2019    MPV 10.4 11/11/2019    RDW 16.2 (H) 11/11/2019     11/11/2019       Patient History:  Recent hospitalizations/HC visits None reported   Recent medication changes None reported   Medications taken regularly that may interact with warfarin or alter INR No significant drug interactions identified   Warfarin dose taken as prescribed -Yes  -Patient has pillbox, but doesn't currently use it; she has a system for taking medications that works well   Signs/symptoms of bleeding No h/o major bleeding reported   Vitamin K intake Normally has ~0-1 serving of green, leafy vegetables per month (occasional broccoli or yohan greens)   Recent vomiting/diarrhea/fever, changes in weight or activity level None reported   Tobacco or alcohol use Patient denies both   Upcoming surgeries or procedures None reported     Assessment/Plan:  Patient's INR was slightly subtherapeutic today (1.9), but has been stable on 27.5 mg/week of warfarin for a year. She denies any missed/extra warfarin doses, diet changes, medication changes, illness, or bleeding since last visit. She did eat brussels sprouts a couple of days ago, which could explain low INR today. Patient was instructed to continue warfarin 2.5 mg on Sun/Tue/Thur, and 5 mg on all other days. Repeat INR in 6 weeks. Patient was reminded to maintain consistent vitamin K intake and call with any bleeding, medication changes, or fever/vomiting/diarrhea. Patient understands dosing directions and information discussed. Dosing schedule and follow up appointment given to patient. Progress note routed to referring physician's office. Patient acknowledges working in consult agreement with pharmacist as referred by his/her physician. Next INR Check:  4/7    Please call [facility] at [phone] with any questions. Thanks! Samantha Skaggs.  Ursula Walker, PharmD, [facility]  [facility]  Ph: [phone]  2/24/2021 1:20 PM    CLINICAL PHARMACY CONSULT: MED RECONCILIATION/REVIEW ADDENDUM    For Pharmacy Admin Tracking Only    PHSO: Yes  Total # of Interventions Recommended: 0  - Maintenance Safety Lab Monitoring #: 1  Total Interventions Accepted: 0  Time Spent (min): 15

## 2021-04-07 ENCOUNTER — ANTI-COAG VISIT (OUTPATIENT)
Dept: PHARMACY | Age: 81
End: 2021-04-07
Payer: MEDICARE

## 2021-04-07 LAB — INTERNATIONAL NORMALIZATION RATIO, POC: 2.4

## 2021-04-07 PROCEDURE — 99211 OFF/OP EST MAY X REQ PHY/QHP: CPT

## 2021-04-07 PROCEDURE — 85610 PROTHROMBIN TIME: CPT

## 2021-04-07 NOTE — PROGRESS NOTES
ANTICOAGULATION SERVICE    Roma Adam is a 78 y.o. female with PMHx significant for DVT, PE, breast CA who presents to clinic on 4/7/2021 for anticoagulation monitoring and adjustment. 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     Anticoagulation Indication(s):  DVT, PE    Referring Physician:  Dr. Pepito Gandhi  Goal INR Range:  2-3  Duration of Anticoagulation Therapy:  Indefinite  Time of day dose taken:  AM  Product patient has at home:  warfarin 5 mg (peach)    INR Summary                            Warfarin regimen (mg)  Date INR   A/P    Sun Mon Tue Wed Thu Fri Sat Mg/wk  4/7 2.4 At goal, no change  2.5 5 2.5 5 2.5 5 5 27.5  2/24 1.9 Below goal, continue  2.5 5 2.5 5 2.5 5 5 27.5  1/13 2.8 At goal, no change  2.5 5 2.5 5 2.5 5 5 27.5  12/2 2.3 At goal, no change  2.5 5 2.5 5 2.5 5 5 27.5  10/21 3.0 At goal, no change  2.5 5 2.5 5 2.5 5 5 27.5  9/9 2.8 At goal, no change  2.5 5 2.5 5 2.5 5 5 27.5  8/5 2.8 At goal, no change  2.5 5 2.5 5 2.5 5 5 27.5  7/2 2.3 At goal, no change  2.5 5 2.5 5 2.5 5 5 27.5  6/5 2.02 At goal, no change  2.5 5 2.5 5 2.5 5 5 27.5  5/4 2.44 At goal, no change  2.5 5 2.5 5 2.5 5 5 27.5  4/3 2.21 At goal, no change  2.5 5 2.5 5 2.5 5 5 27.5  3/6 1.8 Below goal, continue  2.5 5 2.5 5 2.5 5 5 27.5  2/7 2.5 At goal, no change  2.5 5 2.5 5 2.5 5 5 27.5  1/10 2.2 At goal, no change  2.5 5 2.5 5 2.5 5 5 27.5  12/19 2.2 At goal, no change  2.5 5 2.5 5 2.5 5 5 27.5    Last CBC:  Lab Results   Component Value Date    RBC 4.09 11/11/2019    HGB 12.5 11/11/2019    HCT 38.0 11/11/2019    MCV 92.9 11/11/2019    MCH 30.7 11/11/2019    MPV 10.4 11/11/2019    RDW 16.2 (H) 11/11/2019     11/11/2019       Patient History:  Recent hospitalizations/HC visits None reported   Recent medication changes None reported   Medications taken regularly that may interact with warfarin or alter INR No significant drug interactions identified   Warfarin dose taken as prescribed -Yes  -Patient has pillbox, but doesn't currently use it; she has a system for taking medications that works well   Signs/symptoms of bleeding No h/o major bleeding reported   Vitamin K intake Normally has ~0-1 serving of green, leafy vegetables per month (occasional broccoli or yohan greens)   Recent vomiting/diarrhea/fever, changes in weight or activity level None reported   Tobacco or alcohol use Patient denies both   Upcoming surgeries or procedures None reported     Assessment/Plan:  Patient's INR was therapeutic today (2.4). She denies any missed/extra warfarin doses, diet changes, medication changes, illness, or bleeding since last visit. Patient was instructed to continue warfarin 2.5 mg on Sun/Tue/Thur, and 5 mg on all other days. Repeat INR in 6 weeks. Patient was reminded to maintain consistent vitamin K intake and call with any bleeding, medication changes, or fever/vomiting/diarrhea. Patient understands dosing directions and information discussed. Dosing schedule and follow up appointment given to patient. Progress note routed to referring physician's office. Patient acknowledges working in consult agreement with pharmacist as referred by his/her physician. Next INR Check:  5/19    Please call [facility] at [phone] with any questions. Thanks!   Yadiel Purivs, PharmD  PGY1 Pharmacy Resident  4/7/2021 1:13 PM    CLINICAL PHARMACY CONSULT: MED RECONCILIATION/REVIEW ADDENDUM    For Pharmacy Admin Tracking Only    PHSO: Yes  Total # of Interventions Recommended: 0  - Maintenance Safety Lab Monitoring #: 1  Total Interventions Accepted: 0  Time Spent (min): 15

## 2021-05-19 ENCOUNTER — ANTI-COAG VISIT (OUTPATIENT)
Dept: PHARMACY | Age: 81
End: 2021-05-19
Payer: MEDICARE

## 2021-05-19 LAB — INTERNATIONAL NORMALIZATION RATIO, POC: 2.3

## 2021-05-19 PROCEDURE — 85610 PROTHROMBIN TIME: CPT

## 2021-05-19 PROCEDURE — 99211 OFF/OP EST MAY X REQ PHY/QHP: CPT

## 2021-05-19 NOTE — PROGRESS NOTES
ANTICOAGULATION SERVICE    Mallory Phillip is a 78 y.o. female with PMHx significant for DVT, PE, breast CA who presents to clinic on 5/19/2021 for anticoagulation monitoring and adjustment. 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     Anticoagulation Indication(s):  DVT, PE    Referring Physician:  Dr. Aurora Reyes  Goal INR Range:  2-3  Duration of Anticoagulation Therapy:  Indefinite  Time of day dose taken:  AM  Product patient has at home:  warfarin 5 mg (peach)    INR Summary                            Warfarin regimen (mg)  Date INR   A/P    Sun Mon Tue Wed Thu Fri Sat Mg/wk  5/19 2.3 At goal, no change  2.5 5 2.5 5 2.5 5 5 27.5  4/7 2.4 At goal, no change  2.5 5 2.5 5 2.5 5 5 27.5  2/24 1.9 Below goal, continue  2.5 5 2.5 5 2.5 5 5 27.5  1/13 2.8 At goal, no change  2.5 5 2.5 5 2.5 5 5 27.5  12/2 2.3 At goal, no change  2.5 5 2.5 5 2.5 5 5 27.5  10/21 3.0 At goal, no change  2.5 5 2.5 5 2.5 5 5 27.5  9/9 2.8 At goal, no change  2.5 5 2.5 5 2.5 5 5 27.5  8/5 2.8 At goal, no change  2.5 5 2.5 5 2.5 5 5 27.5  7/2 2.3 At goal, no change  2.5 5 2.5 5 2.5 5 5 27.5  6/5 2.02 At goal, no change  2.5 5 2.5 5 2.5 5 5 27.5  5/4 2.44 At goal, no change  2.5 5 2.5 5 2.5 5 5 27.5  4/3 2.21 At goal, no change  2.5 5 2.5 5 2.5 5 5 27.5  3/6 1.8 Below goal, continue  2.5 5 2.5 5 2.5 5 5 27.5  2/7 2.5 At goal, no change  2.5 5 2.5 5 2.5 5 5 27.5  1/10 2.2 At goal, no change  2.5 5 2.5 5 2.5 5 5 27.5  12/19 2.2 At goal, no change  2.5 5 2.5 5 2.5 5 5 27.5    Last CBC:  Lab Results   Component Value Date    RBC 4.09 11/11/2019    HGB 12.5 11/11/2019    HCT 38.0 11/11/2019    MCV 92.9 11/11/2019    MCH 30.7 11/11/2019    MPV 10.4 11/11/2019    RDW 16.2 (H) 11/11/2019     11/11/2019       Patient History:  Recent hospitalizations/HC visits None reported   Recent medication changes None reported   Medications taken regularly that may interact with warfarin or alter INR No significant drug interactions identified   Warfarin dose taken as prescribed -Yes  -Patient has pillbox, but doesn't currently use it; she has a system for taking medications that works well   Signs/symptoms of bleeding No h/o major bleeding reported   Vitamin K intake Normally has ~0-1 serving of green, leafy vegetables per month (occasional broccoli or yohan greens)   Recent vomiting/diarrhea/fever, changes in weight or activity level None reported   Tobacco or alcohol use Patient denies both   Upcoming surgeries or procedures None reported     Assessment/Plan:  Patient's INR was therapeutic today (2.3). She denies any missed/extra warfarin doses, diet changes, medication changes, illness, or bleeding since last visit. Patient was instructed to continue warfarin 2.5 mg on Sun/Tue/Thur, and 5 mg on all other days. Repeat INR in 6 weeks. Patient was reminded to maintain consistent vitamin K intake and call with any bleeding, medication changes, or fever/vomiting/diarrhea. Patient understands dosing directions and information discussed. Dosing schedule and follow up appointment given to patient. Progress note routed to referring physician's office. Patient acknowledges working in consult agreement with pharmacist as referred by his/her physician. Next INR Check:  6/30    Please call [facility] at [phone] with any questions. Thanks!   Gwen Palafox, PharmD  PGY1 Pharmacy Resident  5/19/2021 12:59 PM    CLINICAL PHARMACY CONSULT: MED RECONCILIATION/REVIEW ADDENDUM    For Pharmacy Admin Tracking Only    PHSO: Yes  Total # of Interventions Recommended: 0  - Maintenance Safety Lab Monitoring #: 1  Total Interventions Accepted: 0  Time Spent (min): 15

## 2021-06-03 DIAGNOSIS — I10 ESSENTIAL HYPERTENSION, BENIGN: ICD-10-CM

## 2021-06-04 RX ORDER — AMLODIPINE BESYLATE 5 MG/1
TABLET ORAL
Qty: 30 TABLET | Refills: 0 | Status: SHIPPED | OUTPATIENT
Start: 2021-06-04 | End: 2021-07-07 | Stop reason: SDUPTHER

## 2021-06-14 ENCOUNTER — TELEPHONE (OUTPATIENT)
Dept: INTERNAL MEDICINE CLINIC | Age: 81
End: 2021-06-14

## 2021-06-14 NOTE — TELEPHONE ENCOUNTER
----- Message from Yany Pride sent at 6/14/2021 11:32 AM EDT -----  Subject: Appointment Request    Reason for Call: Routine Existing Condition Follow Up    QUESTIONS  Type of Appointment? Established Patient  Reason for appointment request? Available appointments did not meet   patient need  Additional Information for Provider? patient needs to be seen for med   refill for b/p and blood thinner. patient has concerns about arthritis in   legs also.  ---------------------------------------------------------------------------  --------------  CALL BACK INFO  What is the best way for the office to contact you? OK to leave message on   voicemail  Preferred Call Back Phone Number? [phone]  ---------------------------------------------------------------------------  --------------  SCRIPT ANSWERS  Relationship to Patient? Self  Appointment reason? Well Care/Follow Ups  Select a Well Care/Follow Ups appointment reason? Adult Existing Condition   Follow Up [Diabetes, CHF, COPD, Hypertension/Blood Pressure Check]  (Is the patient requesting to be seen urgently for their symptoms?)? No  Is this follow up request related to routine Diabetes Management? No  Are you having any new concerns about your existing condition? No  Have you been diagnosed with, awaiting test results for, or told that you   are suspected of having COVID-19 (Coronavirus)? (If patient has tested   negative or was tested as a requirement for work, school, or travel and   not based on symptoms, answer no)? No  Do you currently have flu-like symptoms including fever or chills, cough,   shortness of breath, difficulty breathing, or new loss of taste or smell? No  Have you had close contact with someone with COVID-19 in the last 14 days? No  (Service Expert  click yes below to proceed with [employer] As Usual   Scheduling)?  Yes

## 2021-06-30 ENCOUNTER — ANTI-COAG VISIT (OUTPATIENT)
Dept: PHARMACY | Age: 81
End: 2021-06-30
Payer: MEDICARE

## 2021-06-30 DIAGNOSIS — I26.99 PULMONARY EMBOLISM, UNSPECIFIED CHRONICITY, UNSPECIFIED PULMONARY EMBOLISM TYPE, UNSPECIFIED WHETHER ACUTE COR PULMONALE PRESENT (HCC): Primary | ICD-10-CM

## 2021-06-30 LAB — INTERNATIONAL NORMALIZATION RATIO, POC: 2.1

## 2021-06-30 PROCEDURE — 85610 PROTHROMBIN TIME: CPT

## 2021-06-30 PROCEDURE — 99211 OFF/OP EST MAY X REQ PHY/QHP: CPT

## 2021-06-30 NOTE — PROGRESS NOTES
ANTICOAGULATION SERVICE    Alison Munoz is a 78 y.o. female with PMHx significant for DVT, PE, breast CA who presents to clinic on 6/30/2021 for anticoagulation monitoring and adjustment. 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     Anticoagulation Indication(s):  DVT, PE    Referring Physician:  Dr. Mena Antis  Goal INR Range:  2-3  Duration of Anticoagulation Therapy:  Indefinite  Time of day dose taken:  AM  Product patient has at home:  warfarin 5 mg (peach)    INR Summary                            Warfarin regimen (mg)  Date INR   A/P    Sun Mon Tue Wed Thu Fri Sat Mg/wk  5/19 2.3 At goal, no change  2.5 5 2.5 5 2.5 5 5 27.5  4/7 2.4 At goal, no change  2.5 5 2.5 5 2.5 5 5 27.5  2/24 1.9 Below goal, continue  2.5 5 2.5 5 2.5 5 5 27.5  1/13 2.8 At goal, no change  2.5 5 2.5 5 2.5 5 5 27.5  12/2 2.3 At goal, no change  2.5 5 2.5 5 2.5 5 5 27.5  10/21 3.0 At goal, no change  2.5 5 2.5 5 2.5 5 5 27.5  9/9 2.8 At goal, no change  2.5 5 2.5 5 2.5 5 5 27.5  8/5 2.8 At goal, no change  2.5 5 2.5 5 2.5 5 5 27.5  7/2 2.3 At goal, no change  2.5 5 2.5 5 2.5 5 5 27.5  6/5 2.02 At goal, no change  2.5 5 2.5 5 2.5 5 5 27.5  5/4 2.44 At goal, no change  2.5 5 2.5 5 2.5 5 5 27.5  4/3 2.21 At goal, no change  2.5 5 2.5 5 2.5 5 5 27.5  3/6 1.8 Below goal, continue  2.5 5 2.5 5 2.5 5 5 27.5  2/7 2.5 At goal, no change  2.5 5 2.5 5 2.5 5 5 27.5  1/10 2.2 At goal, no change  2.5 5 2.5 5 2.5 5 5 27.5  12/19 2.2 At goal, no change  2.5 5 2.5 5 2.5 5 5 27.5    Last CBC:  Lab Results   Component Value Date    RBC 4.09 11/11/2019    HGB 12.5 11/11/2019    HCT 38.0 11/11/2019    MCV 92.9 11/11/2019    MCH 30.7 11/11/2019    MPV 10.4 11/11/2019    RDW 16.2 (H) 11/11/2019     11/11/2019       Patient History:  Recent hospitalizations/HC visits None reported   Recent medication changes None reported   Medications taken regularly that may interact with warfarin or alter INR No significant drug interactions identified   Warfarin dose taken as prescribed -Yes  -Patient has pillbox, but doesn't currently use it; she has a system for taking medications that works well   Signs/symptoms of bleeding No h/o major bleeding reported   Vitamin K intake Normally has ~0-1 serving of green, leafy vegetables per month (occasional broccoli or yohan greens)   Recent vomiting/diarrhea/fever, changes in weight or activity level None reported   Tobacco or alcohol use Patient denies both   Upcoming surgeries or procedures None reported     Assessment/Plan:  Patient's INR was therapeutic today (2.1). She denies any missed/extra warfarin doses, diet changes, medication changes, illness, or bleeding since last visit. Patient was instructed to continue warfarin 2.5 mg on Sun/Tue/Thur, and 5 mg on all other days. Repeat INR in 6 weeks. Patient was reminded to maintain consistent vitamin K intake and call with any bleeding, medication changes, or fever/vomiting/diarrhea. Patient understands dosing directions and information discussed. Dosing schedule and follow up appointment given to patient. Progress note routed to referring physician's office. Patient acknowledges working in consult agreement with pharmacist as referred by his/her physician. Next INR Check:  8/11    Please call [facility] at [phone] with any questions.      For Pharmacy Admin Tracking Only     Total # of Interventions Recommended: 0   Total # of Interventions Accepted: 0   Time Spent (min): 15

## 2021-07-07 ENCOUNTER — OFFICE VISIT (OUTPATIENT)
Dept: INTERNAL MEDICINE CLINIC | Age: 81
End: 2021-07-07
Payer: MEDICARE

## 2021-07-07 VITALS
BODY MASS INDEX: 42.69 KG/M2 | HEIGHT: 62 IN | WEIGHT: 232 LBS | DIASTOLIC BLOOD PRESSURE: 82 MMHG | OXYGEN SATURATION: 98 % | SYSTOLIC BLOOD PRESSURE: 130 MMHG | HEART RATE: 79 BPM

## 2021-07-07 DIAGNOSIS — C50.512 MALIGNANT NEOPLASM OF LOWER-OUTER QUADRANT OF LEFT BREAST OF FEMALE, ESTROGEN RECEPTOR POSITIVE (HCC): ICD-10-CM

## 2021-07-07 DIAGNOSIS — M17.11 OSTEOARTHRITIS OF RIGHT KNEE, UNSPECIFIED OSTEOARTHRITIS TYPE: ICD-10-CM

## 2021-07-07 DIAGNOSIS — E66.01 OBESITY, CLASS III, BMI 40-49.9 (MORBID OBESITY) (HCC): ICD-10-CM

## 2021-07-07 DIAGNOSIS — I10 ESSENTIAL HYPERTENSION, BENIGN: ICD-10-CM

## 2021-07-07 DIAGNOSIS — Z17.0 MALIGNANT NEOPLASM OF LOWER-OUTER QUADRANT OF LEFT BREAST OF FEMALE, ESTROGEN RECEPTOR POSITIVE (HCC): ICD-10-CM

## 2021-07-07 DIAGNOSIS — E55.9 VITAMIN D DEFICIENCY: ICD-10-CM

## 2021-07-07 DIAGNOSIS — K21.9 GASTROESOPHAGEAL REFLUX DISEASE WITHOUT ESOPHAGITIS: ICD-10-CM

## 2021-07-07 DIAGNOSIS — Z00.00 PE (PHYSICAL EXAM), ANNUAL: Primary | ICD-10-CM

## 2021-07-07 DIAGNOSIS — C50.919 MALIGNANT NEOPLASM OF FEMALE BREAST, UNSPECIFIED ESTROGEN RECEPTOR STATUS, UNSPECIFIED LATERALITY, UNSPECIFIED SITE OF BREAST (HCC): ICD-10-CM

## 2021-07-07 DIAGNOSIS — Z23 NEED FOR 23-POLYVALENT PNEUMOCOCCAL POLYSACCHARIDE VACCINE: ICD-10-CM

## 2021-07-07 DIAGNOSIS — Z90.12 S/P PARTIAL MASTECTOMY, LEFT: ICD-10-CM

## 2021-07-07 DIAGNOSIS — I26.99 OTHER PULMONARY EMBOLISM WITHOUT ACUTE COR PULMONALE, UNSPECIFIED CHRONICITY (HCC): ICD-10-CM

## 2021-07-07 PROCEDURE — 90732 PPSV23 VACC 2 YRS+ SUBQ/IM: CPT | Performed by: INTERNAL MEDICINE

## 2021-07-07 PROCEDURE — G0439 PPPS, SUBSEQ VISIT: HCPCS | Performed by: INTERNAL MEDICINE

## 2021-07-07 PROCEDURE — 4040F PNEUMOC VAC/ADMIN/RCVD: CPT | Performed by: INTERNAL MEDICINE

## 2021-07-07 PROCEDURE — 1123F ACP DISCUSS/DSCN MKR DOCD: CPT | Performed by: INTERNAL MEDICINE

## 2021-07-07 PROCEDURE — G0009 ADMIN PNEUMOCOCCAL VACCINE: HCPCS | Performed by: INTERNAL MEDICINE

## 2021-07-07 RX ORDER — WARFARIN SODIUM 5 MG/1
TABLET ORAL
Qty: 100 TABLET | Refills: 1 | Status: SHIPPED | OUTPATIENT
Start: 2021-07-07 | End: 2021-12-28

## 2021-07-07 RX ORDER — AMLODIPINE BESYLATE 5 MG/1
TABLET ORAL
Qty: 90 TABLET | Refills: 1 | Status: SHIPPED | OUTPATIENT
Start: 2021-07-07 | End: 2021-12-28

## 2021-07-07 SDOH — ECONOMIC STABILITY: FOOD INSECURITY: WITHIN THE PAST 12 MONTHS, YOU WORRIED THAT YOUR FOOD WOULD RUN OUT BEFORE YOU GOT MONEY TO BUY MORE.: NEVER TRUE

## 2021-07-07 SDOH — ECONOMIC STABILITY: FOOD INSECURITY: WITHIN THE PAST 12 MONTHS, THE FOOD YOU BOUGHT JUST DIDN'T LAST AND YOU DIDN'T HAVE MONEY TO GET MORE.: NEVER TRUE

## 2021-07-07 ASSESSMENT — PATIENT HEALTH QUESTIONNAIRE - PHQ9
SUM OF ALL RESPONSES TO PHQ QUESTIONS 1-9: 0
SUM OF ALL RESPONSES TO PHQ QUESTIONS 1-9: 0
1. LITTLE INTEREST OR PLEASURE IN DOING THINGS: 0
2. FEELING DOWN, DEPRESSED OR HOPELESS: 0
SUM OF ALL RESPONSES TO PHQ QUESTIONS 1-9: 0
SUM OF ALL RESPONSES TO PHQ9 QUESTIONS 1 & 2: 0

## 2021-07-07 ASSESSMENT — LIFESTYLE VARIABLES: HOW OFTEN DO YOU HAVE A DRINK CONTAINING ALCOHOL: 0

## 2021-07-07 ASSESSMENT — SOCIAL DETERMINANTS OF HEALTH (SDOH): HOW HARD IS IT FOR YOU TO PAY FOR THE VERY BASICS LIKE FOOD, HOUSING, MEDICAL CARE, AND HEATING?: NOT HARD AT ALL

## 2021-07-07 NOTE — PROGRESS NOTES
Annual Wellness Visit     Patient:  Andrey Villeda                                               : 1940  Age: 78 y.o. MRN: <[id number]>  Date : 2021      CHIEF COMPLAINT: Andrey Villeda is a 78 y.o. female who presents for : Physical exam    1. Other pulmonary embolism without acute cor pulmonale, unspecified chronicity (HCC)  Problem is stable she is on chronic warfarin  - warfarin (COUMADIN) 5 MG tablet; TAKE ONE  TABLET DAILY FOR 4 DAYS OUT OF THE WEEK AND HALF TABLET FOR 3 DAYS  Dispense: 100 tablet; Refill: 1    2. Essential hypertension, benign  No complaints well-controlled  - amLODIPine (NORVASC) 5 MG tablet; TAKE ONE TABLET BY MOUTH DAILY  Dispense: 90 tablet; Refill: 1  - CBC Auto Differential; Future  - Comprehensive Metabolic Panel; Future  - Lipid Panel; Future  - TSH without Reflex; Future  - Urinalysis; Future  - Vitamin D 25 Hydroxy; Future    3. Malignant neoplasm of lower-outer quadrant of left breast of female, estrogen receptor positive ([facility]  History of breast cancer due for mammogram  - CBC Auto Differential; Future  - Comprehensive Metabolic Panel; Future  - Lipid Panel; Future  - TSH without Reflex; Future  - Urinalysis; Future  - Vitamin D 25 Hydroxy; Future    4. Gastroesophageal reflux disease without esophagitis  This problem is stable  - CBC Auto Differential; Future  - Comprehensive Metabolic Panel; Future  - Lipid Panel; Future  - TSH without Reflex; Future  - Urinalysis; Future  - Vitamin D 25 Hydroxy; Future    5. Osteoarthritis of right knee, unspecified osteoarthritis type  This problem is stable with current meds    6. Vitamin D deficiency    - Vitamin D 25 Hydroxy; Future    7. S/P partial mastectomy, left      8. PE (physical exam), annual  Generally feels good denies any chest pain shortness of breath or any other problems  - CBC Auto Differential; Future  - Comprehensive Metabolic Panel; Future  - Lipid Panel; Future  - TSH without Reflex;  Future  - Urinalysis; Future  - Vitamin D 25 Hydroxy; Future    9.  Obesity, Class III, BMI 40-49.9 (morbid obesity) ([facility])  Is still trying to lose weight        Patient Active Problem List    Diagnosis Date Noted    Morbidly obese ([facility] 07/27/2020    S/P partial mastectomy, left 01/22/2019    History of right breast cancer 01/22/2019    Chronic benign neutropenia ([facility] 08/17/2018    Incisional hernia, incarcerated     Abnormal CT scan, lung 01/27/2017    Breast cancer ([facility] 08/25/2014    Pulmonary embolism ([facility] 02/23/2012     Replacing Inactive Diagnoses      GERD (gastroesophageal reflux disease) 05/11/2010    DJD (degenerative joint disease) 05/11/2010    Essential hypertension, benign 05/11/2010    Vitamin D deficiency 05/11/2010       Constitutional:  Denies fever or chills   Eyes:  Denies change in visual acuity   HENT:  Denies nasal congestion or sore throat   Respiratory:  Denies cough or shortness of breath   Cardiovascular:  Denies chest pain or edema   GI:  Denies abdominal pain, nausea, vomiting, bloody stools or diarrhea   :  Denies dysuria   Musculoskeletal:  Denies back pain or joint pain   Integument:  Denies rash   Neurologic:  Denies headache, focal weakness or sensory changes   Endocrine:  Denies polyuria or polydipsia   Lymphatic:  Denies swollen glands   Psychiatric:  Denies depression or anxiety     Past Medical History:        Diagnosis Date    Abscess of anal and rectal regions     Breast cancer ([facility] 8/25/2014    Cancer ([facility]     breast    DJD (degenerative joint disease)     GERD (gastroesophageal reflux disease)     History of blood transfusion     Hx of blood clots     Hypertension     Long term (current) use of anticoagulants     Obesity     PE (pulmonary embolism) 2/23/2012    Personal history of venous thrombosis and embolism     Recurrent hernia        Past Surgical History:        Procedure Laterality Date    BREAST LUMPECTOMY      RIGHT    COLONOSCOPY      EYE SURGERY CATARACTS    OTHER SURGICAL HISTORY N/A 06/06/2017    LAPAROSCOPIC VENTRAL HERNIA REPAIR        [place] MASTECTOMY, PARTIAL Left 8/24/2018    MAMMOGRAM GUIDED NEEDLE LOCALIZED, LEFT BREAST LUMPECTOMY WITH LEFT BREAST SENTINEL NODE BIOPSY WITH RADIOISOTOPE AND GAMMA PROBE performed by Gian Esposito MD at [address]         Family History:  Family History   Problem Relation Age of Onset    Alcohol Abuse Father     Alcohol Abuse Paternal Uncle     Cancer Mother 80        throat    Cancer Brother 76        throat, smoker       Social History:  Social History     Socioeconomic History    Marital status:      Spouse name: None    Number of children: None    Years of education: None    Highest education level: None   Occupational History    None   Tobacco Use    Smoking status: Never Smoker    Smokeless tobacco: Never Used   Vaping Use    Vaping Use: Never used   Substance and Sexual Activity    Alcohol use: No    Drug use: No    Sexual activity: None   Other Topics Concern    None   Social History Narrative    None     Social Determinants of Health     Financial Resource Strain: Low Risk     Difficulty of Paying Living Expenses: Not hard at all   Food Insecurity: No Food Insecurity    Worried About Running Out of Food in the Last Year: Never true    Makenna of Food in the Last Year: Never true   Transportation Needs:     Lack of Transportation (Medical):      Lack of Transportation (Non-Medical):    Physical Activity:     Days of Exercise per Week:     Minutes of Exercise per Session:    Stress:     Feeling of Stress :    Social Connections:     Frequency of Communication with Friends and Family:     Frequency of Social Gatherings with Friends and Family:     Attends Mu-ism Services:     Active Member of Clubs or Organizations:     Attends Club or Organization Meetings:     Marital Status:    Intimate Partner Violence:     Fear of Current or Ex-Partner:  Emotionally Abused:     Physically Abused:     Sexually Abused: Allergies:  Cipro xr, Penicillins, and Vicodin [hydrocodone-acetaminophen]    Current Medications:    Prior to Admission medications    Medication Sig Start Date End Date Taking? Authorizing Provider   warfarin (COUMADIN) 5 MG tablet TAKE ONE  TABLET DAILY FOR 4 DAYS OUT OF THE WEEK AND HALF TABLET FOR 3 DAYS 7/7/21  Yes Yudy Mendes MD   amLODIPine (NORVASC) 5 MG tablet TAKE ONE TABLET BY MOUTH DAILY 7/7/21  Yes Yudy Mendes MD   brimonidine (ALPHAGAN P) 0.1 % SOLN Place 1 drop into the right eye every 8 hours Indications: patient taking BID    Yes Historical Provider, MD   dorzolamide (TRUSOPT) 2 % ophthalmic solution Place 2 drops into both eyes 3 times daily Indications: patient taking BID    Yes Historical Provider, MD   docusate sodium (COLACE) 100 MG capsule Take 1 capsule by mouth 2 times daily  Patient taking differently: Take 100 mg by mouth 2 times daily as needed  2/15/17  Yes Marlinda Bolus, APRN - CNP   acetaminophen (TYLENOL) 650 MG extended release tablet Take 2 tablets by mouth every 8 hours as needed for Pain 2/15/17  Yes Marlinda Bolus, APRN - CNP   ascorbic acid (VITAMIN C) 500 MG tablet Take 500 mg by mouth daily. Yes Historical Provider, MD   vitamin D (CHOLECALCIFEROL) 400 UNITS TABS tablet Take 400 Units by mouth daily. Yes Historical Provider, MD   ferrous sulfate 325 (65 FE) MG tablet Take 325 mg by mouth daily (with breakfast). Yes Historical Provider, MD           Physical Exam:      Constitutional:  Well developed, overweight no acute distress, non-toxic appearance   Eyes:  PERRL, conjunctiva normal   HENT:  Atraumatic, external ears normal, nose normal, oropharynx moist, no pharyngeal exudates.  Neck- normal range of motion, no tenderness, supple   Respiratory:  No respiratory distress, normal breath sounds, no rales, no wheezing   Cardiovascular:  Normal rate, normal rhythm, no murmurs, no gallops, no rubs   GI:  Soft, nondistended, normal bowel sounds, nontender, no organomegaly, no mass, no rebound, no guarding   :  No costovertebral angle tenderness   Musculoskeletal:  No edema, no tenderness, no deformities. Back- no tenderness  Integument:  Well hydrated, no rash   Lymphatic:  No lymphadenopathy noted   Neurologic:  Alert & oriented x 3, CN 2-12 normal, normal motor function, normal sensory function, no focal deficits noted   Psychiatric:  Speech and behavior appropriate   Breast exam reveals evidence of radiation changes in the breast no masses felt    Vitals: /82   Pulse 79   Ht 5' 2\" (1.575 m)   Wt 232 lb (105.2 kg)   SpO2 98%   BMI 42.43 kg/m²     Body mass index is 42.43 kg/m².   Wt Readings from Last 3 Encounters:   07/07/21 232 lb (105.2 kg)   08/27/20 225 lb (102.1 kg)   07/27/20 225 lb (102.1 kg)         LABS:    CBC:   Lab Results   Component Value Date    WBC 2.7 (L) 11/11/2019    HGB 12.5 11/11/2019    HCT 38.0 11/11/2019    MCV 92.9 11/11/2019     11/11/2019           Lab Results   Component Value Date    IRON 55 06/21/2017    TIBC 171 (L) 06/21/2017    LCNIDECC00 584 03/10/2016                                                             BMP:    Lab Results   Component Value Date     11/11/2019    K 4.0 11/11/2019     11/11/2019    CO2 23 11/11/2019       LFT's:   Lab Results   Component Value Date    ALT 11 11/11/2019    AST 15 11/11/2019    ALKPHOS 66 11/11/2019    BILITOT <0.2 11/11/2019       Lipids:   Lab Results   Component Value Date    CHOL 160 06/02/2018    HDL 64 (H) 06/02/2018    LDLCALC 84 06/02/2018    TRIG 58 06/02/2018       INR:   Lab Results   Component Value Date    INR 2.1 06/30/2021    INR 2.3 05/19/2021    INR 2.4 04/07/2021    PROTIME 23.6 (H) 06/05/2020    PROTIME 28.6 (H) 05/04/2020    PROTIME 25.9 (H) 04/03/2020       U/A:  Lab Results   Component Value Date    LABMICR YES 06/02/2018        No results found for: LABA1C     Lab Results   Component Value Date    CREATININE 1.0 11/11/2019       -----------------------------------------------------------------     Assessment/Plan:   1. Other pulmonary embolism without acute cor pulmonale, unspecified chronicity (HCC)  This problem is stable continue chronic Coumadin therapy  - warfarin (COUMADIN) 5 MG tablet; TAKE ONE  TABLET DAILY FOR 4 DAYS OUT OF THE WEEK AND HALF TABLET FOR 3 DAYS  Dispense: 100 tablet; Refill: 1    2. Essential hypertension, benign  Problem is stable continue present meds  - amLODIPine (NORVASC) 5 MG tablet; TAKE ONE TABLET BY MOUTH DAILY  Dispense: 90 tablet; Refill: 1  - CBC Auto Differential; Future  - Comprehensive Metabolic Panel; Future  - Lipid Panel; Future  - TSH without Reflex; Future  - Urinalysis; Future  - Vitamin D 25 Hydroxy; Future    3. Malignant neoplasm of lower-outer quadrant of left breast of female, estrogen receptor positive ([facility]  Problem is stable to get a repeat mammogram  - CBC Auto Differential; Future  - Comprehensive Metabolic Panel; Future  - Lipid Panel; Future  - TSH without Reflex; Future  - Urinalysis; Future  - Vitamin D 25 Hydroxy; Future    4. Gastroesophageal reflux disease without esophagitis  Problem is stable on present meds  - CBC Auto Differential; Future  - Comprehensive Metabolic Panel; Future  - Lipid Panel; Future  - TSH without Reflex; Future  - Urinalysis; Future  - Vitamin D 25 Hydroxy; Future    5. Osteoarthritis of right knee, unspecified osteoarthritis type  Problem is stable continue exercise as tolerated    6. Vitamin D deficiency  Check above labs  - Vitamin D 25 Hydroxy; Future    7. S/P partial mastectomy, left  Repeat mammogram    8. PE (physical exam), annual  Screening labs Pneumovax given she is strong encouraged to try to lose weight and will follow-up in 6 months  - CBC Auto Differential; Future  - Comprehensive Metabolic Panel; Future  - Lipid Panel; Future  - TSH without Reflex;  Future  - Urinalysis; Future  - Vitamin D 25 Hydroxy; Future    9.  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

## 2021-07-07 NOTE — PROGRESS NOTES
of anal and rectal regions     Breast cancer ([facility] 8/25/2014    Cancer ([facility]     breast    DJD (degenerative joint disease)     GERD (gastroesophageal reflux disease)     History of blood transfusion     Hx of blood clots     Hypertension     Long term (current) use of anticoagulants     Obesity     PE (pulmonary embolism) 2/23/2012    Personal history of venous thrombosis and embolism     Recurrent hernia        Past Surgical History:   Procedure Laterality Date    BREAST LUMPECTOMY      RIGHT    COLONOSCOPY      EYE SURGERY      CATARACTS    OTHER SURGICAL HISTORY N/A 06/06/2017    LAPAROSCOPIC VENTRAL HERNIA REPAIR        [place] MASTECTOMY, PARTIAL Left 8/24/2018    MAMMOGRAM GUIDED NEEDLE LOCALIZED, LEFT BREAST LUMPECTOMY WITH LEFT BREAST SENTINEL NODE BIOPSY WITH RADIOISOTOPE AND GAMMA PROBE performed by Ahmet Morelos MD at [place] [address]         Family History   Problem Relation Age of Onset    Alcohol Abuse Father     Alcohol Abuse Paternal Uncle     Cancer Mother 80        throat    Cancer Brother 75        throat, smoker       CareTeam (Including outside providers/suppliers regularly involved in providing care):   Patient Care Team:  Linwood Newell MD as PCP - General (Internal Medicine)  Carlos Hung MD as PCP - Hematology/Oncology (Hematology and Oncology)  Linwood Newell MD as PCP - [facility]  Reyes Flint, MD as Consulting Physician (Radiation Oncology)  Lisa Castellanos MD as Surgeon (General Surgery)  Ahmet Morelos MD as Surgeon (General Surgery)  Ahmet Morelos MD as Surgeon (General Surgery)    Wt Readings from Last 3 Encounters:   07/07/21 232 lb (105.2 kg)   08/27/20 225 lb (102.1 kg)   07/27/20 225 lb (102.1 kg)     Vitals:    07/07/21 1545   BP: 130/82   Pulse: 79   SpO2: 98%   Weight: 232 lb (105.2 kg)   Height: 5' 2\" (1.575 m)     Body mass index is 42.43 kg/m².     Based upon direct observation of the patient, evaluation of cognition reveals recent and remote memory intact. General Appearance: alert and oriented to person, place and time, well developed and well- nourished, in no acute distress  Skin: warm and dry, no rash or erythema  Head: normocephalic and atraumatic  Eyes: pupils equal, round, and reactive to light, extraocular eye movements intact, conjunctivae normal  ENT: tympanic membrane, external ear and ear canal normal bilaterally, nose without deformity, nasal mucosa and turbinates normal without polyps  Neck: supple and non-tender without mass, no thyromegaly or thyroid nodules, no cervical lymphadenopathy  Pulmonary/Chest: clear to auscultation bilaterally- no wheezes, rales or rhonchi, normal air movement, no respiratory distress  Cardiovascular: normal rate, regular rhythm, normal S1 and S2, no murmurs, rubs, clicks, or gallops, distal pulses intact, no carotid bruits  Abdomen: soft, non-tender, non-distended, normal bowel sounds, no masses or organomegaly  Extremities: no cyanosis, clubbing or edema  Musculoskeletal: normal range of motion, no joint swelling, deformity or tenderness  Neurologic: reflexes normal and symmetric, no cranial nerve deficit, gait, coordination and speech normal    Patient's complete Health Risk Assessment and screening values have been reviewed and are found in Flowsheets. The following problems were reviewed today and where indicated follow up appointments were made and/or referrals ordered. Positive Risk Factor Screenings with Interventions:          General Health and ACP:  General  In general, how would you say your health is?: Good  In the past 7 days, have you experienced any of the following?  New or Increased Pain, New or Increased Fatigue, Loneliness, Social Isolation, Stress or Anger?: (!) New or Increased Pain  Do you get the social and emotional support that you need?: Yes  Do you have a Living Will?: (!) No  Advance Directives     Power of  Living Will ACP-Advance frequency per FRAX score)  Completed    COVID-19 Vaccine  Completed    Hepatitis A vaccine  Aged Out    Hepatitis B vaccine  Aged Out    Hib vaccine  Aged Out    Meningococcal (ACWY) vaccine  Aged Out     Recommendations for [place] Due: see orders and patient instructions/AVS.  . Recommended screening schedule for the next 5-10 years is provided to the patient in written form: see Patient Vicki Young was seen today for medicare awv.     Diagnoses and all orders for this visit:    Other pulmonary embolism without acute cor pulmonale, unspecified chronicity (HCC)  -     warfarin (COUMADIN) 5 MG tablet; TAKE ONE  TABLET DAILY FOR 4 DAYS OUT OF THE WEEK AND HALF TABLET FOR 3 DAYS    Essential hypertension, benign  -     amLODIPine (NORVASC) 5 MG tablet; TAKE ONE TABLET BY MOUTH DAILY

## 2021-07-08 ENCOUNTER — TELEPHONE (OUTPATIENT)
Dept: INTERNAL MEDICINE CLINIC | Age: 81
End: 2021-07-08

## 2021-07-08 NOTE — TELEPHONE ENCOUNTER
Aida with [employer] called stating they need a new order for   [facility]IG[facility]NOST[facility] W OR WO CAD BILATERAL (Order [id number]. The ICD 10 code will not meet medical necessity. It needs to be more specific. Please call to advise.

## 2021-07-12 DIAGNOSIS — E55.9 VITAMIN D DEFICIENCY: ICD-10-CM

## 2021-07-12 DIAGNOSIS — Z17.0 MALIGNANT NEOPLASM OF LOWER-OUTER QUADRANT OF LEFT BREAST OF FEMALE, ESTROGEN RECEPTOR POSITIVE (HCC): ICD-10-CM

## 2021-07-12 DIAGNOSIS — I10 ESSENTIAL HYPERTENSION, BENIGN: ICD-10-CM

## 2021-07-12 DIAGNOSIS — K21.9 GASTROESOPHAGEAL REFLUX DISEASE WITHOUT ESOPHAGITIS: ICD-10-CM

## 2021-07-12 DIAGNOSIS — C50.512 MALIGNANT NEOPLASM OF LOWER-OUTER QUADRANT OF LEFT BREAST OF FEMALE, ESTROGEN RECEPTOR POSITIVE (HCC): ICD-10-CM

## 2021-07-12 DIAGNOSIS — Z00.00 PE (PHYSICAL EXAM), ANNUAL: ICD-10-CM

## 2021-07-12 LAB
A/G RATIO: 0.9 (ref 1.1–2.2)
ALBUMIN SERPL-MCNC: 3.8 G/DL (ref 3.4–5)
ALP BLD-CCNC: 78 U/L (ref 40–129)
ALT SERPL-CCNC: 10 U/L (ref 10–40)
ANION GAP SERPL CALCULATED.3IONS-SCNC: 13 MMOL/L (ref 3–16)
AST SERPL-CCNC: 19 U/L (ref 15–37)
BACTERIA: ABNORMAL /HPF
BASOPHILS ABSOLUTE: 0 K/UL (ref 0–0.2)
BASOPHILS RELATIVE PERCENT: 0.4 %
BILIRUB SERPL-MCNC: 0.4 MG/DL (ref 0–1)
BILIRUBIN URINE: NEGATIVE
BLOOD, URINE: NEGATIVE
BUN BLDV-MCNC: 16 MG/DL (ref 7–20)
CALCIUM SERPL-MCNC: 9.7 MG/DL (ref 8.3–10.6)
CHLORIDE BLD-SCNC: 106 MMOL/L (ref 99–110)
CHOLESTEROL, TOTAL: 143 MG/DL (ref 0–199)
CLARITY: CLEAR
CO2: 24 MMOL/L (ref 21–32)
COLOR: YELLOW
CREAT SERPL-MCNC: 1 MG/DL (ref 0.6–1.2)
EOSINOPHILS ABSOLUTE: 0.1 K/UL (ref 0–0.6)
EOSINOPHILS RELATIVE PERCENT: 1.7 %
EPITHELIAL CELLS, UA: 11 /HPF (ref 0–5)
GFR AFRICAN AMERICAN: >60
GFR NON-AFRICAN AMERICAN: 53
GLOBULIN: 4.1 G/DL
GLUCOSE BLD-MCNC: 92 MG/DL (ref 70–99)
GLUCOSE URINE: NEGATIVE MG/DL
HCT VFR BLD CALC: 41.3 % (ref 36–48)
HDLC SERPL-MCNC: 59 MG/DL (ref 40–60)
HEMOGLOBIN: 13.7 G/DL (ref 12–16)
HYALINE CASTS: 1 /LPF (ref 0–8)
KETONES, URINE: NEGATIVE MG/DL
LDL CHOLESTEROL CALCULATED: 75 MG/DL
LEUKOCYTE ESTERASE, URINE: ABNORMAL
LYMPHOCYTES ABSOLUTE: 1.1 K/UL (ref 1–5.1)
LYMPHOCYTES RELATIVE PERCENT: 37.9 %
MCH RBC QN AUTO: 31 PG (ref 26–34)
MCHC RBC AUTO-ENTMCNC: 33.2 G/DL (ref 31–36)
MCV RBC AUTO: 93.3 FL (ref 80–100)
MICROSCOPIC EXAMINATION: YES
MONOCYTES ABSOLUTE: 0.3 K/UL (ref 0–1.3)
MONOCYTES RELATIVE PERCENT: 11.2 %
NEUTROPHILS ABSOLUTE: 1.5 K/UL (ref 1.7–7.7)
NEUTROPHILS RELATIVE PERCENT: 48.8 %
NITRITE, URINE: NEGATIVE
PDW BLD-RTO: 15.3 % (ref 12.4–15.4)
PH UA: 6 (ref 5–8)
PLATELET # BLD: 203 K/UL (ref 135–450)
PMV BLD AUTO: 9.5 FL (ref 5–10.5)
POTASSIUM SERPL-SCNC: 4.4 MMOL/L (ref 3.5–5.1)
PROTEIN UA: NEGATIVE MG/DL
RBC # BLD: 4.43 M/UL (ref 4–5.2)
RBC UA: 2 /HPF (ref 0–4)
SODIUM BLD-SCNC: 143 MMOL/L (ref 136–145)
SPECIFIC GRAVITY UA: 1.02 (ref 1–1.03)
TOTAL PROTEIN: 7.9 G/DL (ref 6.4–8.2)
TRIGL SERPL-MCNC: 47 MG/DL (ref 0–150)
TSH SERPL DL<=0.05 MIU/L-ACNC: 1.54 UIU/ML (ref 0.27–4.2)
URINE TYPE: ABNORMAL
UROBILINOGEN, URINE: 1 E.U./DL
VITAMIN D 25-HYDROXY: 57.5 NG/ML
VLDLC SERPL CALC-MCNC: 9 MG/DL
WBC # BLD: 3 K/UL (ref 4–11)
WBC UA: 5 /HPF (ref 0–5)

## 2021-07-21 ENCOUNTER — TELEPHONE (OUTPATIENT)
Dept: PHARMACY | Age: 81
End: 2021-07-21

## 2021-07-21 DIAGNOSIS — Z79.01 ENCOUNTER FOR CURRENT LONG-TERM USE OF ANTICOAGULANTS: ICD-10-CM

## 2021-07-21 DIAGNOSIS — I26.99 PULMONARY EMBOLISM, UNSPECIFIED CHRONICITY, UNSPECIFIED PULMONARY EMBOLISM TYPE, UNSPECIFIED WHETHER ACUTE COR PULMONALE PRESENT (HCC): Primary | ICD-10-CM

## 2021-07-21 NOTE — TELEPHONE ENCOUNTER
This patient needs updated signed referral from provider to continue management in the anticoagulation clinic. A referral order has been pended within this encounter. Please sign order in encounter.     Thank you,  Allyssa Ruiz PharmD  [facility]  Ph: [phone]

## 2021-08-09 ENCOUNTER — APPOINTMENT (OUTPATIENT)
Dept: GENERAL RADIOLOGY | Age: 81
End: 2021-08-09
Payer: MEDICARE

## 2021-08-09 ENCOUNTER — HOSPITAL ENCOUNTER (EMERGENCY)
Age: 81
Discharge: HOME | End: 2021-08-09
Attending: EMERGENCY MEDICINE
Payer: MEDICARE

## 2021-08-09 VITALS
SYSTOLIC BLOOD PRESSURE: 138 MMHG | TEMPERATURE: 97.1 F | WEIGHT: 185 LBS | BODY MASS INDEX: 34.04 KG/M2 | HEIGHT: 62 IN | HEART RATE: 80 BPM | RESPIRATION RATE: 16 BRPM | OXYGEN SATURATION: 100 % | DIASTOLIC BLOOD PRESSURE: 84 MMHG
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
## 2021-08-09 PROCEDURE — 73552 X-RAY EXAM OF FEMUR 2/>: CPT

## 2021-08-09 PROCEDURE — 73590 X-RAY EXAM OF LOWER LEG: CPT

## 2021-08-09 PROCEDURE — 73630 X-RAY EXAM OF FOOT: CPT

## 2021-08-09 PROCEDURE — 6370000000 HC RX 637 (ALT 250 FOR IP): Performed by: STUDENT IN AN ORGANIZED HEALTH CARE EDUCATION/TRAINING PROGRAM

## 2021-08-09 PROCEDURE — 99284 EMERGENCY DEPT VISIT MOD MDM: CPT

## 2021-08-09 PROCEDURE — 72170 X-RAY EXAM OF PELVIS: CPT

## 2021-08-09 RX ORDER — LIDOCAINE 4 G/G
1 PATCH TOPICAL ONCE
Status: DISCONTINUED | OUTPATIENT
Start: 2021-08-09 | End: 2021-08-09 | Stop reason: HOSPADM

## 2021-08-09 RX ORDER — ACETAMINOPHEN 500 MG
1000 TABLET ORAL ONCE
Status: COMPLETED | OUTPATIENT
Start: 2021-08-09 | End: 2021-08-09

## 2021-08-09 RX ORDER — IBUPROFEN 400 MG/1
800 TABLET ORAL ONCE
Status: DISCONTINUED | OUTPATIENT
Start: 2021-08-09 | End: 2021-08-09 | Stop reason: HOSPADM

## 2021-08-09 RX ADMIN — ACETAMINOPHEN 1000 MG: 500 TABLET, FILM COATED ORAL at 17:43

## 2021-08-09 ASSESSMENT — PAIN SCALES - GENERAL
PAINLEVEL_OUTOF10: 0
PAINLEVEL_OUTOF10: 10
PAINLEVEL_OUTOF10: 10

## 2021-08-09 NOTE — ED PROVIDER NOTES
[id number] RESIDENT NOTE       Date of evaluation: 8/9/2021    Chief Complaint     Fall (pt tripped at gas station sat and c/o back pain and right leg pain)      History of Present Illness     Jessica Reno is a 78 y.o. female who presents with right leg pain after a mechanical fall at a gas station today. The patient tripped on a rug and fell forward. She was unable to get up due to not being able to bear weight on her right leg. She also complains of pain in the right lower back. She is on Coumadin but denies any head injury today. She did not lose consciousness. She has no other complaints. Review of Systems     Review of Systems   All other systems reviewed and are negative. Past Medical, Surgical, Family, and Social History     She has a past medical history of Abscess of anal and rectal regions, Breast cancer ([address] Cancer ([address] DJD (degenerative joint disease), GERD (gastroesophageal reflux disease), History of blood transfusion, Hx of blood clots, Hypertension, Long term (current) use of anticoagulants, Obesity, PE (pulmonary embolism), Personal history of venous thrombosis and embolism, and Recurrent hernia. She has a past surgical history that includes Umbilical hernia repair; Colonoscopy; other surgical history (N/A, 06/06/2017); Breast lumpectomy; eye surgery; and pr mastectomy, partial (Left, 8/24/2018). Her family history includes Alcohol Abuse in her father and paternal uncle; Cancer (age of onset: 76) in her brother; Cancer (age of onset: 80) in her mother. She reports that she has never smoked. She has never used smokeless tobacco. She reports that she does not drink alcohol and does not use drugs.     Medications     Discharge Medication List as of 8/9/2021  7:02 PM      CONTINUE these medications which have NOT CHANGED    Details   warfarin (COUMADIN) 5 MG tablet TAKE ONE  TABLET DAILY FOR 4 DAYS OUT OF THE WEEK AND HALF TABLET FOR 3 DAYS, Disp-100 tablet, R-1Normal      amLODIPine (NORVASC) 5 MG tablet TAKE ONE TABLET BY MOUTH DAILY, Disp-90 tablet, R-1NEEDS AN APPOINTMENTNormal      brimonidine (ALPHAGAN P) 0.1 % SOLN Place 1 drop into the right eye every 8 hours Indications: patient taking BID Historical Med      dorzolamide (TRUSOPT) 2 % ophthalmic solution Place 2 drops into both eyes 3 times daily Indications: patient taking BID Historical Med      docusate sodium (COLACE) 100 MG capsule Take 1 capsule by mouth 2 times daily, Disp-30 capsule, R-0      acetaminophen (TYLENOL) 650 MG extended release tablet Take 2 tablets by mouth every 8 hours as needed for Pain, Disp-60 tablet, R-3      ascorbic acid (VITAMIN C) 500 MG tablet Take 500 mg by mouth daily. vitamin D (CHOLECALCIFEROL) 400 UNITS TABS tablet Take 400 Units by mouth daily. ferrous sulfate 325 (65 FE) MG tablet Take 325 mg by mouth daily (with breakfast). Allergies     She is allergic to cipro xr, penicillins, and vicodin [hydrocodone-acetaminophen]. Physical Exam     INITIAL VITALS: BP: (!) 167/104, Temp: 97.1 °F (36.2 °C), Pulse: 79, Resp: 18, SpO2: 100 %   Physical Exam  Constitutional:       General: She is not in acute distress. Appearance: She is well-developed. She is not diaphoretic. HENT:      Head: Normocephalic and atraumatic. Right Ear: External ear normal.      Left Ear: External ear normal.      Nose: Nose normal.   Eyes:      Conjunctiva/sclera: Conjunctivae normal.      Pupils: Pupils are equal, round, and reactive to light. Cardiovascular:      Rate and Rhythm: Normal rate and regular rhythm. Heart sounds: Normal heart sounds. Pulmonary:      Effort: Pulmonary effort is normal. No respiratory distress. Breath sounds: Normal breath sounds. Abdominal:      General: There is no distension. Palpations: Abdomen is soft. Tenderness: There is no abdominal tenderness.    Musculoskeletal: General: Tenderness (R femur, knee, tib/fib, ankle and foot. No midline spinal TTP) present. Comments: RLE pain with logroll. ROM limited by pain   Skin:     General: Skin is warm and dry. Findings: No rash. Neurological:      General: No focal deficit present. Mental Status: She is alert and oriented to person, place, and time. Motor: No weakness or abnormal muscle tone. Psychiatric:         Mood and Affect: Mood normal.         Behavior: Behavior normal.         Thought Content: Thought content normal.         DiagnosticResults       RADIOLOGY:  XR FOOT RIGHT (MIN 3 VIEWS)   Final Result      No acute fracture seen. XR PELVIS (1-2 VIEWS)   Final Result      Advanced degenerative change in left hip joint. No fracture seen. XR FEMUR RIGHT (MIN 2 VIEWS)   Final Result      No acute fracture seen. XR TIBIA FIBULA RIGHT (2 VIEWS)   Final Result      No acute fracture seen. LABS:   No results found for this visit on 08/09/21. RECENT VITALS:  BP: 138/84, Temp: 97.1 °F (36.2 °C), Pulse: 80,Resp: 16, SpO2: 100 %     Procedures         ED Course     Nursing Notes, Past Medical Hx, Past Surgical Hx, Social Hx, Allergies, and Family Hx were reviewed. The patient was given the followingmedications:  Orders Placed This Encounter   Medications    acetaminophen (TYLENOL) tablet 1,000 mg    ibuprofen (ADVIL;MOTRIN) tablet 800 mg    lidocaine 4 % external patch 1 patch       CONSULTS:  None    MEDICAL DECISION MAKING / ASSESSMENT / Brant Bernheim is a 78 y.o. female presenting with history and exam as above. The patient is alert, oriented, and well-appearing with a normal neurologic exam and denies any head injury. She is clear that this was a mechanical fall rather than a syncopal episode. X-rays were performed to evaluate her right lower extremity pain and were negative for fracture or other acute abnormalities.   After treatment with Tylenol and a lidocaine patch, she was able to ambulate with her walker without assistance. She is therefore appropriate for discharge home with PCP follow-up. She was given standard return precautions. This patient was also evaluated by the attending physician. All care plans werediscussed and agreed upon. Clinical Impression     1.  Fall, initial encounter        Disposition     PATIENT REFERRED TO:  Wilder Lombardi MD  [address] [place] [address]  [phone]    Schedule an appointment as soon as possible for a visit in 3 days  As needed      DISCHARGE MEDICATIONS:  Discharge Medication List as of 8/9/2021  7:02 PM          Jesus Cuevas MD  Resident  08/09/21 [id number]

## 2021-08-11 ENCOUNTER — HOSPITAL ENCOUNTER (EMERGENCY)
Age: 81
Discharge: HOME | End: 2021-08-11
Payer: MEDICARE

## 2021-08-11 ENCOUNTER — APPOINTMENT (OUTPATIENT)
Dept: CT IMAGING | Age: 81
End: 2021-08-11
Payer: MEDICARE

## 2021-08-11 VITALS
TEMPERATURE: 99.6 F | BODY MASS INDEX: 33.13 KG/M2 | WEIGHT: 180 LBS | RESPIRATION RATE: 16 BRPM | DIASTOLIC BLOOD PRESSURE: 81 MMHG | HEART RATE: 91 BPM | SYSTOLIC BLOOD PRESSURE: 154 MMHG | OXYGEN SATURATION: 100 % | HEIGHT: 62 IN

## 2021-08-11 DIAGNOSIS — M25.561 RIGHT KNEE PAIN, UNSPECIFIED CHRONICITY: ICD-10-CM

## 2021-08-11 DIAGNOSIS — M25.551 RIGHT HIP PAIN: Primary | ICD-10-CM

## 2021-08-11 LAB
INR BLD: 2.84 (ref 0.88–1.12)
PROTHROMBIN TIME: 33.5 SEC (ref 9.9–12.7)

## 2021-08-11 PROCEDURE — 72125 CT NECK SPINE W/O DYE: CPT

## 2021-08-11 PROCEDURE — 85610 PROTHROMBIN TIME: CPT

## 2021-08-11 PROCEDURE — 99284 EMERGENCY DEPT VISIT MOD MDM: CPT

## 2021-08-11 PROCEDURE — 36415 COLL VENOUS BLD VENIPUNCTURE: CPT

## 2021-08-11 PROCEDURE — 6370000000 HC RX 637 (ALT 250 FOR IP): Performed by: PHYSICIAN ASSISTANT

## 2021-08-11 PROCEDURE — 73700 CT LOWER EXTREMITY W/O DYE: CPT

## 2021-08-11 PROCEDURE — 70450 CT HEAD/BRAIN W/O DYE: CPT

## 2021-08-11 RX ORDER — OXYCODONE HYDROCHLORIDE AND ACETAMINOPHEN 5; 325 MG/1; MG/1
1-2 TABLET ORAL EVERY 6 HOURS PRN
Qty: 10 TABLET | Refills: 0 | Status: SHIPPED | OUTPATIENT
Start: 2021-08-11 | End: 2021-08-14

## 2021-08-11 RX ORDER — ONDANSETRON 4 MG/1
4 TABLET, ORALLY DISINTEGRATING ORAL ONCE
Status: COMPLETED | OUTPATIENT
Start: 2021-08-11 | End: 2021-08-11

## 2021-08-11 RX ORDER — ONDANSETRON 4 MG/1
4 TABLET, ORALLY DISINTEGRATING ORAL EVERY 8 HOURS PRN
Qty: 20 TABLET | Refills: 0 | Status: SHIPPED | OUTPATIENT
Start: 2021-08-11

## 2021-08-11 RX ORDER — LIDOCAINE 50 MG/G
1 PATCH TOPICAL DAILY
Qty: 30 PATCH | Refills: 0 | Status: SHIPPED | OUTPATIENT
Start: 2021-08-11

## 2021-08-11 RX ORDER — OXYCODONE HYDROCHLORIDE AND ACETAMINOPHEN 5; 325 MG/1; MG/1
2 TABLET ORAL ONCE
Status: COMPLETED | OUTPATIENT
Start: 2021-08-11 | End: 2021-08-11

## 2021-08-11 RX ADMIN — OXYCODONE HYDROCHLORIDE AND ACETAMINOPHEN 2 TABLET: 5; 325 TABLET ORAL at 13:54

## 2021-08-11 RX ADMIN — ONDANSETRON 4 MG: 4 TABLET, ORALLY DISINTEGRATING ORAL at 13:54

## 2021-08-11 ASSESSMENT — ENCOUNTER SYMPTOMS
SHORTNESS OF BREATH: 0
NAUSEA: 0
VOMITING: 0

## 2021-08-11 ASSESSMENT — PAIN SCALES - GENERAL: PAINLEVEL_OUTOF10: 10

## 2021-08-11 NOTE — ED PROVIDER NOTES
[address] [facility]        Pt Name: Lonnie Rollins  MRN: [id number]  [place] 1940  Date of evaluation: 8/11/2021  Provider: Heather Cordero PA-C  PCP: Jasmin Melendez MD  Note Started: 2:44 PM EDT       MANAN. I have evaluated this patient. My supervising physician was available for consultation. CHIEF COMPLAINT       Chief Complaint   Patient presents with    Leg Pain     tripped over rug on Saturday and landed on right leg. Went to ER and was told there were no broken bones. Still unable to bear weight. 10/10 pain in right leg. HISTORY OF PRESENT ILLNESS   (Location, Timing/Onset, Context/Setting, Quality, Duration, Modifying Factors, Severity, Associated Signs and Symptoms)  Note limiting factors. Chief Complaint: Yamile Hedrick is a 78 y.o. female who presents to the emergency department today for evaluation for a fall which occurred on Saturday. The patient states that she was walking into the store, she states that she tripped over the rug, fell, landing on her right knee. The patient tells me that she landed face first, and she is unsure if she hit her head. She denies any loss of consciousness. No vomiting. She is on Coumadin secondary to history of blood clots. The patient was actually seen at an outside facility 2 days ago for similar symptoms, she states that she had x-rays performed, and x-rays were negative for any acute process. She states that she has had a worsening pain since being at home and she states that she is having trouble bearing weight on the leg particularly with her knee as well as her hip. Patient is rating her pain as a 10/10, her pain is sharp, constant is worse with touch and certain movements. Patient denies any headaches. She has no visual changes. She has no dizziness. No numbness, tingling or weakness. No recent illnesses.  No other complaints    Nursing Notes were all reviewed and agreed with or any disagreements were addressed in the HPI. REVIEW OF SYSTEMS    (2-9 systems for level 4, 10 or more for level 5)     Review of Systems   Constitutional: Negative for activity change, appetite change and fever. Eyes: Negative for visual disturbance. Respiratory: Negative for shortness of breath. Cardiovascular: Negative for chest pain. Gastrointestinal: Negative for nausea and vomiting. Musculoskeletal: Positive for arthralgias. Skin: Negative for wound. Neurological: Negative for weakness, numbness and headaches. Positives and Pertinent negatives as per HPI. Except as noted above in the ROS, all other systems were reviewed and negative.        PAST MEDICAL HISTORY     Past Medical History:   Diagnosis Date    Abscess of anal and rectal regions     Breast cancer ([facility] [address] 8/25/2014    Cancer (HCC)     breast    DJD (degenerative joint disease)     GERD (gastroesophageal reflux disease)     History of blood transfusion     Hx of blood clots     Hypertension     Long term (current) use of anticoagulants     Obesity     PE (pulmonary embolism) 2/23/2012    Personal history of venous thrombosis and embolism     Recurrent hernia          SURGICAL HISTORY     Past Surgical History:   Procedure Laterality Date    BREAST LUMPECTOMY      RIGHT    COLONOSCOPY      EYE SURGERY      CATARACTS    OTHER SURGICAL HISTORY N/A 06/06/2017    LAPAROSCOPIC VENTRAL HERNIA REPAIR        HI MASTECTOMY, PARTIAL Left 8/24/2018    MAMMOGRAM GUIDED NEEDLE LOCALIZED, LEFT BREAST LUMPECTOMY WITH LEFT BREAST SENTINEL NODE BIOPSY WITH RADIOISOTOPE AND GAMMA PROBE performed by Cynthia Luther MD at [facility]       Previous Medications    ACETAMINOPHEN (TYLENOL) 650 MG EXTENDED RELEASE TABLET    Take 2 tablets by mouth every 8 hours as needed for Pain    AMLODIPINE (NORVASC) 5 MG TABLET    TAKE ONE TABLET BY MOUTH DAILY    ASCORBIC ACID (VITAMIN C) 500 MG TABLET    Take 500 mg by mouth daily. BRIMONIDINE (ALPHAGAN P) 0.1 % SOLN    Place 1 drop into the right eye every 8 hours Indications: patient taking BID     DOCUSATE SODIUM (COLACE) 100 MG CAPSULE    Take 1 capsule by mouth 2 times daily    DORZOLAMIDE (TRUSOPT) 2 % OPHTHALMIC SOLUTION    Place 2 drops into both eyes 3 times daily Indications: patient taking BID     FERROUS SULFATE 325 (65 FE) MG TABLET    Take 325 mg by mouth daily (with breakfast). VITAMIN D (CHOLECALCIFEROL) 400 UNITS TABS TABLET    Take 400 Units by mouth daily. WARFARIN (COUMADIN) 5 MG TABLET    TAKE ONE  TABLET DAILY FOR 4 DAYS OUT OF THE WEEK AND HALF TABLET FOR 3 DAYS         ALLERGIES     Cipro xr, Penicillins, and Vicodin [hydrocodone-acetaminophen]    FAMILYHISTORY       Family History   Problem Relation Age of Onset    Alcohol Abuse Father     Alcohol Abuse Paternal Uncle     Cancer Mother 80        throat    Cancer Brother 76        throat, smoker          SOCIAL HISTORY       Social History     Tobacco Use    Smoking status: Never Smoker    Smokeless tobacco: Never Used   Vaping Use    Vaping Use: Never used   Substance Use Topics    Alcohol use: No    Drug use: No       SCREENINGS             PHYSICAL EXAM    (up to 7 for level 4, 8 or more for level 5)     ED Triage Vitals [08/11/21 1327]   BP Temp Temp Source Pulse Resp SpO2 Height Weight   (!) 154/81 99.6 °F (37.6 °C) Oral 91 16 100 % 5' 2\" (1.575 m) 180 lb (81.6 kg)       Physical Exam  Vitals and nursing note reviewed. Constitutional:       Appearance: She is well-developed. She is not diaphoretic. HENT:      Head: Normocephalic and atraumatic. Comments: There is no connor sign raccoon sign. No CSF rhinorrhea     Right Ear: External ear normal.      Left Ear: External ear normal.      Nose: Nose normal.   Eyes:      General:         Right eye: No discharge. Left eye: No discharge. Neck:      Trachea: No tracheal deviation. Cardiovascular:      Rate and Rhythm: Normal rate and regular rhythm. Heart sounds: No murmur heard. Pulmonary:      Effort: Pulmonary effort is normal. No respiratory distress. Breath sounds: Normal breath sounds. No wheezing or rales. Musculoskeletal:         General: Normal range of motion. Cervical back: Normal range of motion and neck supple. Comments: There is no midline spinal tenderness    There is diffuse tenderness noted over the lateral aspect of the right hip, with pain with logroll. She does have tenderness to palpation over the right knee, with some mild edema and tenderness noted over the distal femur. There is no erythema, warmth. Patient has pain with range of motion. Dorsalis pedis and posterior to be also pulse are 2+. Normal sensation light touch neurovascularly intact   Skin:     General: Skin is warm and dry. Neurological:      General: No focal deficit present. Mental Status: She is alert and oriented to person, place, and time. Psychiatric:         Behavior: Behavior normal.         DIAGNOSTIC RESULTS   LABS:    Labs Reviewed   PROTIME-INR - Abnormal; Notable for the following components:       Result Value    Protime 33.5 (*)     INR 2.84 (*)     All other components within normal limits    Narrative:     Performed at:  [facility] [address], [place], [facility] [address]   Phone [phone]       When ordered only abnormal lab results are displayed. All other labs were within normal range or not returned as of this dictation. EKG: When ordered, EKG's are interpreted by the Emergency Department Physician in the absence of a cardiologist.  Please see their note for interpretation of EKG.     RADIOLOGY:   Non-plain film images such as CT, Ultrasound and MRI are read by the radiologist. Plain radiographic images are visualized and preliminarily interpreted by the ED Provider with the below findings:        Interpretation per the Radiologist below, if available at the time of this note:    CT KNEE RIGHT WO CONTRAST   Final Result   Right knee:      1. Severe tricompartmental osteoarthrosis. CPPD. 2. Large joint effusion. Moderate Baker's cyst.   3. Loose body in the suprapatellar joint space measuring up to 9 mm. Detached osteophytes along the posterior aspect of the lateral compartment. 4. No acute fracture evident. Right hip:      1. Severe left hip osteoarthrosis. Mild to moderate right hip osteoarthrosis. 2. No acute fracture or dislocation. 3. Atherosclerotic calcification of the aorta and branch vasculature. CT HIP RIGHT WO CONTRAST   Final Result   Right knee:      1. Severe tricompartmental osteoarthrosis. CPPD. 2. Large joint effusion. Moderate Baker's cyst.   3. Loose body in the suprapatellar joint space measuring up to 9 mm. Detached osteophytes along the posterior aspect of the lateral compartment. 4. No acute fracture evident. Right hip:      1. Severe left hip osteoarthrosis. Mild to moderate right hip osteoarthrosis. 2. No acute fracture or dislocation. 3. Atherosclerotic calcification of the aorta and branch vasculature. CT HEAD WO CONTRAST   Final Result   No acute intracranial abnormality. CT CERVICAL SPINE WO CONTRAST   Final Result   No acute abnormality of the cervical spine. XR PELVIS (1-2 VIEWS)    Result Date: 8/9/2021  AP PELVIS  one view HISTORY: Pain COMPARISON: 2/14/2017. FINDINGS: No evidence of acute fracture or traumatic bony abnormality is seen. Severe degenerative changes seen in the left hip joint with extensive sclerosis and secondary bony changes involving the femoral head There is been significant progression from prior study and the possibility of avascular necrosis is a potential consideration. Advanced degenerative change in left hip joint. No fracture seen.      XR FEMUR RIGHT (MIN 2 VIEWS)    Result Date: 8/9/2021  RIGHT FEMUR  4 views HISTORY: Pain COMPARISON: none FINDINGS: No evidence of acute fracture or traumatic bony abnormality is seen. Advanced degenerative changes are noted of the right knee joint with suspected prior patellar fracture. No acute fracture seen. XR TIBIA FIBULA RIGHT (2 VIEWS)    Result Date: 8/9/2021  RIGHT TIBIA FIBULA  4 views HISTORY: Pain COMPARISON: none FINDINGS: No evidence of acute fracture or traumatic bony abnormality is seen. No acute fracture seen. XR FOOT RIGHT (MIN 3 VIEWS)    Result Date: 8/9/2021  RIGHT FOOT  3 views HISTORY: Pain COMPARISON: none FINDINGS: No evidence of acute fracture or traumatic bony abnormality is seen. Degenerative changes seen at the tarsometatarsal joints diffusely. No acute fracture seen. PROCEDURES   Unless otherwise noted below, none     Procedures    CRITICAL CARE TIME   N/A    CONSULTS:  None      EMERGENCY DEPARTMENT COURSE and DIFFERENTIAL DIAGNOSIS/MDM:   Vitals:    Vitals:    08/11/21 1327   BP: (!) 154/81   Pulse: 91   Resp: 16   Temp: 99.6 °F (37.6 °C)   TempSrc: Oral   SpO2: 100%   Weight: 180 lb (81.6 kg)   Height: 5' 2\" (1.575 m)       Patient was given the following medications:  Medications   oxyCODONE-acetaminophen (PERCOCET) 5-325 MG per tablet 2 tablet (2 tablets Oral Given 8/11/21 1354)   ondansetron (ZOFRAN-ODT) disintegrating tablet 4 mg (4 mg Oral Given 8/11/21 1354)           Briefly, this is an 29-year-old female who presents to the emergency department today for evaluation for a fall which occurred on Saturday after tripping on a rug. The patient was seen in emergency room 2 days ago for pain to the right leg, x-rays were negative    Examination, she does have tenderness noted over the right hip and the right knee, CT will be obtained there is severe arthritis with a joint effusion. As the patient is on Coumadin will not perform arthrocentesis. Ace wrap will be applied.   CT of the hip shows no acute fracture dislocation. Patient will be referred to orthopedics she will be given Percocet, Zofran and Lidoderm patches    Patient tells me that she did hit her head, and as she is on Coumadin, will obtain a head CT as she told me that she did have a headache the other day but denies a headache at this time. Negative    inr is 2.84    Patient is able to ambulate without any difficulty therefore I feel that she can be managed as an outpatient as there are no acute fracture. Recommend that she follow with orthopedics within the next 2 to 3 days for reevaluation. She is to return to the ED for any new or worsening symptoms, the patient voiced understanding is agreeable with plan. Stable for discharge. I estimate there is LOW risk for COMPARTMENT SYNDROME, DEEP VENOUS THROMBOSIS, SEPTIC ARTHRITIS, TENDON OR NEUROVASCULAR INJURY, thus I consider the discharge disposition reasonable. Adalberto Salazar and I have discussed the diagnosis and risks, and we agree with discharging home to follow-up with their primary doctor or the referral orthopedist. We also discussed returning to the Emergency Department immediately if new or worsening symptoms occur. We have discussed the symptoms which are most concerning (e.g., changing or worsening pain, numbness, weakness) that necessitate immediate return. FINAL IMPRESSION      1. Right hip pain    2. Right knee pain, unspecified chronicity          DISPOSITION/PLAN   DISPOSITION Decision To Discharge 08/11/2021 04:01:08 PM      PATIENT REFERRED TO:  Agustin Ortiz, [address] [facility]  [address] [place] [address]  [phone]    Schedule an appointment as soon as possible for a visit in 3 days      [facility] Emergency Department  [address] [facility]  [phone]    As needed, If symptoms worsen      DISCHARGE MEDICATIONS:  New Prescriptions    LIDOCAINE (LIDODERM) 5 %    Place 1 patch onto the skin daily 12 hours on, 12 hours off. ONDANSETRON (ZOFRAN ODT) 4 MG DISINTEGRATING TABLET    Take 1 tablet by mouth every 8 hours as needed for Nausea    OXYCODONE-ACETAMINOPHEN (PERCOCET) 5-325 MG PER TABLET    Take 1-2 tablets by mouth every 6 hours as needed for Pain for up to 3 days. WARNING:  May cause drowsiness. May impair ability to operate vehicles or machinery. Do not use in combination with alcohol.        DISCONTINUED MEDICATIONS:  Discontinued Medications    No medications on file              (Please note that portions of this note were completed with a voice recognition program.  Efforts were made to edit the dictations but occasionally words are mis-transcribed.)    River Deshpande PA-C (electronically signed)            River Deshpande PA-C  08/11/21 5312

## 2021-08-18 ENCOUNTER — TELEPHONE (OUTPATIENT)
Dept: INTERNAL MEDICINE CLINIC | Age: 81
End: 2021-08-18

## 2021-08-18 NOTE — TELEPHONE ENCOUNTER
----- Message from Jeffrey French sent at 8/17/2021  4:09 PM EDT -----  Subject: Message to Provider    QUESTIONS  Information for Provider? Pt is needing a zia digital, [employer] told pt   she cant get zia until the Dr. Marion Quan fixes the code for it. Please call   [phone]  ---------------------------------------------------------------------------  --------------  Nikolas HYLTON  What is the best way for the office to contact you? OK to leave message on   voicemail  Preferred Call Back Phone Number?  [phone]  ---------------------------------------------------------------------------  --------------  SCRIPT ANSWERS  undefined

## 2021-08-18 NOTE — TELEPHONE ENCOUNTER
----- Message from Cristofer Sima sent at 8/17/2021  4:06 PM EDT -----  Subject: Appointment Request    Reason for Call: Urgent (Patient Request) No Script    QUESTIONS  Type of Appointment? Established Patient  Reason for appointment request? No appointments available during search  Additional Information for Provider? Pt fell on 8/7 went to ER on Monday 8/9, and 8/14 cant put weight on right leg, no broken bones, just bruised   sore, no available appts, need appt for Next week after Monday so she has   a ride. Please call [phone]  ---------------------------------------------------------------------------  --------------  [employer]  What is the best way for the office to contact you? OK to leave message on   voicemail  Preferred Call Back Phone Number? [phone]  ---------------------------------------------------------------------------  --------------  SCRIPT ANSWERS  Relationship to Patient? Self  (Is the patient requesting to see the provider for a procedure?)? No  (Is the patient requesting to see the provider urgently  today or   tomorrow. )? Yes  Have you been diagnosed with, awaiting test results for, or told that you   are suspected of having COVID-19 (Coronavirus)? (If patient has tested   negative or was tested as a requirement for work, school, or travel and   not based on symptoms, answer no)? No  Do you currently have flu-like symptoms including fever or chills, cough,   shortness of breath, difficulty breathing, or new loss of taste or smell? No  Have you had close contact with someone with COVID-19 in the last 14 days? No  (Service Expert  click yes below to proceed with [employer] As Usual   Scheduling)?  Yes

## 2021-08-26 ENCOUNTER — ANTI-COAG VISIT (OUTPATIENT)
Dept: PHARMACY | Age: 81
End: 2021-08-26
Payer: MEDICARE

## 2021-08-26 ENCOUNTER — OFFICE VISIT (OUTPATIENT)
Dept: INTERNAL MEDICINE CLINIC | Age: 81
End: 2021-08-26
Payer: MEDICARE

## 2021-08-26 VITALS
WEIGHT: 227 LBS | BODY MASS INDEX: 41.77 KG/M2 | HEART RATE: 79 BPM | SYSTOLIC BLOOD PRESSURE: 130 MMHG | DIASTOLIC BLOOD PRESSURE: 80 MMHG | HEIGHT: 62 IN | OXYGEN SATURATION: 98 %

## 2021-08-26 DIAGNOSIS — Z23 NEED FOR INFLUENZA VACCINATION: ICD-10-CM
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
## 2021-08-26 DIAGNOSIS — S80.01XD CONTUSION OF RIGHT KNEE, SUBSEQUENT ENCOUNTER: ICD-10-CM

## 2021-08-26 DIAGNOSIS — I26.99 PULMONARY EMBOLISM, UNSPECIFIED CHRONICITY, UNSPECIFIED PULMONARY EMBOLISM TYPE, UNSPECIFIED WHETHER ACUTE COR PULMONALE PRESENT (HCC): Primary | ICD-10-CM

## 2021-08-26 LAB — INTERNATIONAL NORMALIZATION RATIO, POC: 3.1

## 2021-08-26 PROCEDURE — 1123F ACP DISCUSS/DSCN MKR DOCD: CPT | Performed by: INTERNAL MEDICINE

## 2021-08-26 PROCEDURE — G8427 DOCREV CUR MEDS BY ELIG CLIN: HCPCS | Performed by: INTERNAL MEDICINE

## 2021-08-26 PROCEDURE — 1111F DSCHRG MED/CURRENT MED MERGE: CPT | Performed by: INTERNAL MEDICINE

## 2021-08-26 PROCEDURE — 99211 OFF/OP EST MAY X REQ PHY/QHP: CPT | Performed by: PHARMACIST

## 2021-08-26 PROCEDURE — 85610 PROTHROMBIN TIME: CPT | Performed by: PHARMACIST

## 2021-08-26 PROCEDURE — 1090F PRES/ABSN URINE INCON ASSESS: CPT | Performed by: INTERNAL MEDICINE

## 2021-08-26 PROCEDURE — G8417 CALC BMI ABV UP PARAM F/U: HCPCS | Performed by: INTERNAL MEDICINE

## 2021-08-26 PROCEDURE — G8399 PT W/DXA RESULTS DOCUMENT: HCPCS | Performed by: INTERNAL MEDICINE

## 2021-08-26 PROCEDURE — G0008 ADMIN INFLUENZA VIRUS VAC: HCPCS | Performed by: INTERNAL MEDICINE

## 2021-08-26 PROCEDURE — 4040F PNEUMOC VAC/ADMIN/RCVD: CPT | Performed by: INTERNAL MEDICINE

## 2021-08-26 PROCEDURE — 90694 VACC AIIV4 NO PRSRV 0.5ML IM: CPT | Performed by: INTERNAL MEDICINE

## 2021-08-26 PROCEDURE — 99213 OFFICE O/P EST LOW 20 MIN: CPT | Performed by: INTERNAL MEDICINE

## 2021-08-26 PROCEDURE — 1036F TOBACCO NON-USER: CPT | Performed by: INTERNAL MEDICINE

## 2021-08-26 ASSESSMENT — ENCOUNTER SYMPTOMS
VOMITING: 0
COUGH: 0
WHEEZING: 0
SORE THROAT: 0
SINUS PRESSURE: 0
NAUSEA: 0
SHORTNESS OF BREATH: 0

## 2021-08-26 NOTE — PROGRESS NOTES
ANTICOAGULATION SERVICE    Dominique Monsivais is a 78 y.o. female with PMHx significant for DVT, PE, breast CA who presents to clinic on 8/26/2021 for anticoagulation monitoring and adjustment. 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     Anticoagulation Indication(s):  DVT, PE    Referring Physician:  Dr. Babak Jimenes  Goal INR Range:  2-3  Duration of Anticoagulation Therapy:  Indefinite  Time of day dose taken:  AM  Product patient has at home:  warfarin 5 mg (peach)    INR Summary                            Warfarin regimen (mg)  Date INR   A/P    Sun Mon Tue Wed Thu Fri Sat Mg/wk  8/26 3.1 Above goal, no change 2.5 5 2.5 5 2.5 5 5 27.5  6/30 2.1 At goal, no change  2.5 5 2.5 5 2.5 5 5 27.5  5/19 2.3 At goal, no change  2.5 5 2.5 5 2.5 5 5 27.5  4/7 2.4 At goal, no change  2.5 5 2.5 5 2.5 5 5 27.5  2/24 1.9 Below goal, continue  2.5 5 2.5 5 2.5 5 5 27.5  1/13 2.8 At goal, no change  2.5 5 2.5 5 2.5 5 5 27.5  12/2 2.3 At goal, no change  2.5 5 2.5 5 2.5 5 5 27.5  10/21 3.0 At goal, no change  2.5 5 2.5 5 2.5 5 5 27.5  9/9 2.8 At goal, no change  2.5 5 2.5 5 2.5 5 5 27.5  8/5 2.8 At goal, no change  2.5 5 2.5 5 2.5 5 5 27.5  7/2 2.3 At goal, no change  2.5 5 2.5 5 2.5 5 5 27.5  6/5 2.02 At goal, no change  2.5 5 2.5 5 2.5 5 5 27.5  5/4 2.44 At goal, no change  2.5 5 2.5 5 2.5 5 5 27.5  4/3 2.21 At goal, no change  2.5 5 2.5 5 2.5 5 5 27.5  3/6 1.8 Below goal, continue  2.5 5 2.5 5 2.5 5 5 27.5  2/7 2.5 At goal, no change  2.5 5 2.5 5 2.5 5 5 27.5  1/10 2.2 At goal, no change  2.5 5 2.5 5 2.5 5 5 27.5  12/19 2.2 At goal, no change  2.5 5 2.5 5 2.5 5 5 27.5    Last CBC:  Lab Results   Component Value Date    RBC 4.43 07/12/2021    HGB 13.7 07/12/2021    HCT 41.3 07/12/2021    MCV 93.3 07/12/2021    MCH 31.0 07/12/2021    MPV 9.5 07/12/2021    RDW 15.3 07/12/2021     07/12/2021       Patient History:  Recent hospitalizations/HC visits None reported   Recent medication changes None reported Medications taken regularly that may interact with warfarin or alter INR No significant drug interactions identified   Warfarin dose taken as prescribed -Yes  -Patient has pillbox, but doesn't currently use it; she has a system for taking medications that works well   Signs/symptoms of bleeding No h/o major bleeding reported   Vitamin K intake Normally has ~0-1 serving of green, leafy vegetables per month (occasional broccoli or yohan greens)   Recent vomiting/diarrhea/fever, changes in weight or activity level None reported   Tobacco or alcohol use Patient denies both   Upcoming surgeries or procedures None reported     Assessment/Plan:  Patient's INR was slightly supratherapeutic today (3.1) for unknown reason. She denies any missed/extra warfarin doses, diet changes, medication changes, illness, or bleeding since last visit. Patient was instructed to continue warfarin 2.5 mg on Sun/Tue/Thur, and 5 mg on all other days. Repeat INR in 3 weeks, if stable will extend to 6 weeks. Patient was reminded to maintain consistent vitamin K intake and call with any bleeding, medication changes, or fever/vomiting/diarrhea. Patient understands dosing directions and information discussed. Dosing schedule and follow up appointment given to patient. Progress note routed to referring physician's office. Patient acknowledges working in consult agreement with pharmacist as referred by his/her physician. Next INR Check:  9/16    Please call [facility] at [phone] with any questions.      For Pharmacy Admin Tracking Only     Total # of Interventions Recommended: 0   Total # of Interventions Accepted: 0   Time Spent (min): 15

## 2021-08-26 NOTE — PROGRESS NOTES
Anneliese Cuevas (:  1940) is a 78 y.o. female,Established patient, here for evaluation of the following chief complaint(s):  Follow-Up from Hospital (ED visit X 2. had a fall on 21)         ASSESSMENT/PLAN:     1. Knee contusion   -Referral for physical therapy       Subjective   SUBJECTIVE/OBJECTIVE:  HPI   Ms. 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 She fell after tripping on a rug at a gas station on Aug 9, 2021 and was unable to bear weight on her right leg. She visited the emergency room and returned 2 days later after still being unable to bear weight on her leg. Today she states that the pain is located in her anterior knee and radiates down towards her ankle. She describes the pain as dull and rates it as a 6/10. She states that the pain is worse at night and after activity. She is now able to bear weight on her leg and walk. She reports that elevating the leg, taking Tylenol, and wrapping her leg alleviates the pain and helps with walking. She denies confusion as well as swelling, bruising, numbness, or tingling in the leg. Review of Systems   Constitutional: Negative for chills and fever. HENT: Negative for congestion, ear pain, hearing loss, sinus pressure and sore throat. Eyes: Negative for visual disturbance. Respiratory: Negative for cough, shortness of breath and wheezing. Cardiovascular: Negative for chest pain and leg swelling. Gastrointestinal: Negative for nausea and vomiting. Sore around abdomen. States she fell on her abdomen. Neurological: Negative for light-headedness, numbness and headaches. Psychiatric/Behavioral: Negative for confusion. Objective    Vitals: BP: 130/80   Pulse: 79   Temp: 99.6F   Resp: 16   SpO2: 98%   Physical Exam  HENT:      Head: Normocephalic and atraumatic.    Cardiovascular:      Rate and Rhythm: Normal rate and regular rhythm. Heart sounds: No murmur heard. No friction rub. No gallop. Pulmonary:      Breath sounds: No wheezing, rhonchi or rales. Abdominal:      General: There is no distension. Palpations: Abdomen is soft. There is no mass. Tenderness: There is no abdominal tenderness. There is no guarding. Musculoskeletal:         General: No swelling. Skin:     Findings: No bruising. Neurological:      Mental Status: She is alert and oriented to person, place, and time. Psychiatric:         Mood and Affect: Mood normal.         Behavior: Behavior normal.         Thought Content: Thought content normal.            On this date 8/26/2021 I have spent 10 minutes reviewing previous notes, test results and face to face with the patient discussing the diagnosis and importance of compliance with the treatment plan as well as documenting on the day of the visit. An electronic signature was used to authenticate this note.     --Kavita Johnson MD

## 2021-09-16 ENCOUNTER — ANTI-COAG VISIT (OUTPATIENT)
Dept: PHARMACY | Age: 81
End: 2021-09-16
Payer: MEDICARE

## 2021-09-16 DIAGNOSIS — I26.99 PULMONARY EMBOLISM, UNSPECIFIED CHRONICITY, UNSPECIFIED PULMONARY EMBOLISM TYPE, UNSPECIFIED WHETHER ACUTE COR PULMONALE PRESENT (HCC): Primary | ICD-10-CM

## 2021-09-16 LAB — INTERNATIONAL NORMALIZATION RATIO, POC: 2.7

## 2021-09-16 PROCEDURE — 85610 PROTHROMBIN TIME: CPT | Performed by: PHARMACIST

## 2021-09-16 PROCEDURE — 99211 OFF/OP EST MAY X REQ PHY/QHP: CPT | Performed by: PHARMACIST

## 2021-09-16 NOTE — PROGRESS NOTES
ANTICOAGULATION SERVICE    Loren Vicente is a 78 y.o. female with PMHx significant for DVT, PE, breast CA who presents to clinic on 9/16/2021 for anticoagulation monitoring and adjustment. 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     Anticoagulation Indication(s):  DVT, PE    Referring Physician:  Dr. Casimiro Velez  Goal INR Range:  2-3  Duration of Anticoagulation Therapy:  Indefinite  Time of day dose taken:  AM  Product patient has at home:  warfarin 5 mg (peach)    INR Summary                            Warfarin regimen (mg)  Date INR   A/P    Sun Mon Tue Wed Thu Fri Sat Mg/wk  9/16  Above goal, no change 2.5 5 2.5 5 2.5 5 5 27.5  8/26 3.1 Above goal, no change 2.5 5 2.5 5 2.5 5 5 27.5  6/30 2.1 At goal, no change  2.5 5 2.5 5 2.5 5 5 27.5  5/19 2.3 At goal, no change  2.5 5 2.5 5 2.5 5 5 27.5  4/7 2.4 At goal, no change  2.5 5 2.5 5 2.5 5 5 27.5  2/24 1.9 Below goal, continue  2.5 5 2.5 5 2.5 5 5 27.5  1/13 2.8 At goal, no change  2.5 5 2.5 5 2.5 5 5 27.5  12/2 2.3 At goal, no change  2.5 5 2.5 5 2.5 5 5 27.5  10/21 3.0 At goal, no change  2.5 5 2.5 5 2.5 5 5 27.5  9/9 2.8 At goal, no change  2.5 5 2.5 5 2.5 5 5 27.5  8/5 2.8 At goal, no change  2.5 5 2.5 5 2.5 5 5 27.5  7/2 2.3 At goal, no change  2.5 5 2.5 5 2.5 5 5 27.5  6/5 2.02 At goal, no change  2.5 5 2.5 5 2.5 5 5 27.5  5/4 2.44 At goal, no change  2.5 5 2.5 5 2.5 5 5 27.5  4/3 2.21 At goal, no change  2.5 5 2.5 5 2.5 5 5 27.5  3/6 1.8 Below goal, continue  2.5 5 2.5 5 2.5 5 5 27.5  2/7 2.5 At goal, no change  2.5 5 2.5 5 2.5 5 5 27.5  1/10 2.2 At goal, no change  2.5 5 2.5 5 2.5 5 5 27.5  12/19 2.2 At goal, no change  2.5 5 2.5 5 2.5 5 5 27.5    Last CBC:  Lab Results   Component Value Date    RBC 4.43 07/12/2021    HGB 13.7 07/12/2021    HCT 41.3 07/12/2021    MCV 93.3 07/12/2021    MCH 31.0 07/12/2021    MPV 9.5 07/12/2021    RDW 15.3 07/12/2021     07/12/2021       Patient History:  Recent hospitalizations/HC visits None reported   Recent medication changes None reported   Medications taken regularly that may interact with warfarin or alter INR No significant drug interactions identified   Warfarin dose taken as prescribed -Yes  -Patient has pillbox, but doesn't currently use it; she has a system for taking medications that works well   Signs/symptoms of bleeding No h/o major bleeding reported   Vitamin K intake Normally has ~0-1 serving of green, leafy vegetables per month (occasional broccoli or yohan greens)   Recent vomiting/diarrhea/fever, changes in weight or activity level None reported   Tobacco or alcohol use Patient denies both   Upcoming surgeries or procedures None reported     Assessment/Plan:  Patient's INR was therapeutic today (3.1). INR slightly elevated last appointment for unknown reason. She denies any missed/extra warfarin doses, diet changes, medication changes, illness, or bleeding since last visit. Patient was instructed to continue warfarin 2.5 mg on Sun/Tue/Thur, and 5 mg on all other days. Repeat INR in  6 weeks. Patient was reminded to maintain consistent vitamin K intake and call with any bleeding, medication changes, or fever/vomiting/diarrhea. Patient understands dosing directions and information discussed. Dosing schedule and follow up appointment given to patient. Progress note routed to referring physician's office. Patient acknowledges working in consult agreement with pharmacist as referred by his/her physician. Next INR Check:  10/28    Please call [facility] at [phone] with any questions.      For Pharmacy Admin Tracking Only     Total # of Interventions Recommended: 0   Total # of Interventions Accepted: 0   Time Spent (min): 15

## 2021-10-13 ENCOUNTER — TELEPHONE (OUTPATIENT)
Dept: INTERNAL MEDICINE CLINIC | Age: 81
End: 2021-10-13

## 2021-10-13 NOTE — TELEPHONE ENCOUNTER
[facility] called to request a fax or verbal order for PT and OT. They would also need a progress report from her last visit. Fax- [phone]    Please advise.

## 2021-10-28 ENCOUNTER — ANTI-COAG VISIT (OUTPATIENT)
Dept: PHARMACY | Age: 81
End: 2021-10-28
Payer: MEDICARE

## 2021-10-28 DIAGNOSIS — I26.99 PULMONARY EMBOLISM, UNSPECIFIED CHRONICITY, UNSPECIFIED PULMONARY EMBOLISM TYPE, UNSPECIFIED WHETHER ACUTE COR PULMONALE PRESENT (HCC): Primary | ICD-10-CM

## 2021-10-28 LAB — INTERNATIONAL NORMALIZATION RATIO, POC: 3.9

## 2021-10-28 PROCEDURE — 99212 OFFICE O/P EST SF 10 MIN: CPT | Performed by: PHARMACIST

## 2021-10-28 PROCEDURE — 85610 PROTHROMBIN TIME: CPT | Performed by: PHARMACIST

## 2021-10-28 NOTE — PROGRESS NOTES
ANTICOAGULATION SERVICE    Marylene Prudent is a 78 y.o. female with PMHx significant for DVT, PE, breast CA who presents to clinic on 10/28/2021 for anticoagulation monitoring and adjustment. 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     Anticoagulation Indication(s):  DVT, PE    Referring Physician:  Dr. Joaquin Haynes  Goal INR Range:  2-3  Duration of Anticoagulation Therapy:  Indefinite  Time of day dose taken:  AM  Product patient has at home:  warfarin 5 mg (peach)    INR Summary                            Warfarin regimen (mg)  Date INR   A/P    Sun Mon Tue Wed Thu Fri Sat Mg/wk  10/28 3.9 Above goal, hold x1  2.5 5 2.5 5 2.5 0/5 5 27.5  9/16 2.7 At goal, no change  2.5 5 2.5 5 2.5 5 5 27.5  8/26 3.1 Above goal, no change 2.5 5 2.5 5 2.5 5 5 27.5  6/30 2.1 At goal, no change  2.5 5 2.5 5 2.5 5 5 27.5  5/19 2.3 At goal, no change  2.5 5 2.5 5 2.5 5 5 27.5  4/7 2.4 At goal, no change  2.5 5 2.5 5 2.5 5 5 27.5  2/24 1.9 Below goal, continue  2.5 5 2.5 5 2.5 5 5 27.5  1/13 2.8 At goal, no change  2.5 5 2.5 5 2.5 5 5 27.5  12/2 2.3 At goal, no change  2.5 5 2.5 5 2.5 5 5 27.5  10/21 3.0 At goal, no change  2.5 5 2.5 5 2.5 5 5 27.5  9/9 2.8 At goal, no change  2.5 5 2.5 5 2.5 5 5 27.5  8/5 2.8 At goal, no change  2.5 5 2.5 5 2.5 5 5 27.5  7/2 2.3 At goal, no change  2.5 5 2.5 5 2.5 5 5 27.5  6/5 2.02 At goal, no change  2.5 5 2.5 5 2.5 5 5 27.5  5/4 2.44 At goal, no change  2.5 5 2.5 5 2.5 5 5 27.5  4/3 2.21 At goal, no change  2.5 5 2.5 5 2.5 5 5 27.5  3/6 1.8 Below goal, continue  2.5 5 2.5 5 2.5 5 5 27.5  2/7 2.5 At goal, no change  2.5 5 2.5 5 2.5 5 5 27.5  1/10 2.2 At goal, no change  2.5 5 2.5 5 2.5 5 5 27.5  12/19 2.2 At goal, no change  2.5 5 2.5 5 2.5 5 5 27.5    Last CBC:  Lab Results   Component Value Date    RBC 4.43 07/12/2021    HGB 13.7 07/12/2021    HCT 41.3 07/12/2021    MCV 93.3 07/12/2021    MCH 31.0 07/12/2021    MPV 9.5 07/12/2021    RDW 15.3 07/12/2021     07/12/2021 Only    Intervention Detail: Dose Adjustment: 1, reason: Therapy Optimization  Total # of Interventions Recommended: 1  Total # of Interventions Accepted: 1  Time Spent (min): 15       

## 2021-11-18 ENCOUNTER — ANTI-COAG VISIT (OUTPATIENT)
Dept: PHARMACY | Age: 81
End: 2021-11-18
Payer: MEDICARE

## 2021-11-18 DIAGNOSIS — I26.99 PULMONARY EMBOLISM, UNSPECIFIED CHRONICITY, UNSPECIFIED PULMONARY EMBOLISM TYPE, UNSPECIFIED WHETHER ACUTE COR PULMONALE PRESENT (HCC): Primary | ICD-10-CM

## 2021-11-18 LAB — INTERNATIONAL NORMALIZATION RATIO, POC: 2.3

## 2021-11-18 PROCEDURE — 99211 OFF/OP EST MAY X REQ PHY/QHP: CPT | Performed by: PHARMACIST

## 2021-11-18 PROCEDURE — 85610 PROTHROMBIN TIME: CPT | Performed by: PHARMACIST

## 2021-11-18 NOTE — PROGRESS NOTES
ANTICOAGULATION SERVICE    Jesica Sanches is a 78 y.o. female with PMHx significant for DVT, PE, breast CA who presents to clinic on 11/18/2021 for anticoagulation monitoring and adjustment. 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     Anticoagulation Indication(s):  DVT, PE    Referring Physician:  Dr. Jenny Corral  Goal INR Range:  2-3  Duration of Anticoagulation Therapy:  Indefinite  Time of day dose taken:  AM  Product patient has at home:  warfarin 5 mg (peach)    INR Summary                            Warfarin regimen (mg)  Date INR   A/P    Sun Mon Tue Wed Thu Fri Sat Mg/wk  11/18 2.3 At goal, continue   2.5 5 2.5 5 2.5 5 5 27.5  10/28 3.9 Above goal, hold x1  2.5 5 2.5 5 2.5 0/5 5 27.5  9/16 2.7 At goal, no change  2.5 5 2.5 5 2.5 5 5 27.5  8/26 3.1 Above goal, no change 2.5 5 2.5 5 2.5 5 5 27.5  6/30 2.1 At goal, no change  2.5 5 2.5 5 2.5 5 5 27.5  5/19 2.3 At goal, no change  2.5 5 2.5 5 2.5 5 5 27.5  4/7 2.4 At goal, no change  2.5 5 2.5 5 2.5 5 5 27.5  2/24 1.9 Below goal, continue  2.5 5 2.5 5 2.5 5 5 27.5  1/13 2.8 At goal, no change  2.5 5 2.5 5 2.5 5 5 27.5  12/2 2.3 At goal, no change  2.5 5 2.5 5 2.5 5 5 27.5  10/21 3.0 At goal, no change  2.5 5 2.5 5 2.5 5 5 27.5  9/9 2.8 At goal, no change  2.5 5 2.5 5 2.5 5 5 27.5  8/5 2.8 At goal, no change  2.5 5 2.5 5 2.5 5 5 27.5  7/2 2.3 At goal, no change  2.5 5 2.5 5 2.5 5 5 27.5  6/5 2.02 At goal, no change  2.5 5 2.5 5 2.5 5 5 27.5  5/4 2.44 At goal, no change  2.5 5 2.5 5 2.5 5 5 27.5  4/3 2.21 At goal, no change  2.5 5 2.5 5 2.5 5 5 27.5  3/6 1.8 Below goal, continue  2.5 5 2.5 5 2.5 5 5 27.5  2/7 2.5 At goal, no change  2.5 5 2.5 5 2.5 5 5 27.5  1/10 2.2 At goal, no change  2.5 5 2.5 5 2.5 5 5 27.5  12/19 2.2 At goal, no change  2.5 5 2.5 5 2.5 5 5 27.5    Last CBC:  Lab Results   Component Value Date    RBC 4.43 07/12/2021    HGB 13.7 07/12/2021    HCT 41.3 07/12/2021    MCV 93.3 07/12/2021    MCH 31.0 07/12/2021    MPV 9.5 07/12/2021    RDW 15.3 07/12/2021     07/12/2021       Patient History:  Recent hospitalizations/HC visits None reported   Recent medication changes None reported   Medications taken regularly that may interact with warfarin or alter INR No significant drug interactions identified   Warfarin dose taken as prescribed -Yes  -Patient has pillbox, but doesn't currently use it; she has a system for taking medications that works well   Signs/symptoms of bleeding No h/o major bleeding reported   Vitamin K intake Normally has ~1-2 serving of green, leafy vegetables per month (occasional broccoli or yohan greens)   Recent vomiting/diarrhea/fever, changes in weight or activity level None reported   Tobacco or alcohol use Patient denies both   Upcoming surgeries or procedures None reported     Assessment/Plan:  Patient's INR was therapeutic today (2.3). INR likely elevated last check due to eating less vitamin K foods. She denies any missed/extra warfarin doses, diet changes, medication changes, illness, or bleeding since last visit. Patient was instructed to continue warfarin 2.5 mg on Sun/Tue/Thur, and 5 mg on all other days. Repeat INR in 6 weeks. Patient was reminded to maintain consistent vitamin K intake and call with any bleeding, medication changes, or fever/vomiting/diarrhea. Patient understands dosing directions and information discussed. Dosing schedule and follow up appointment given to patient. Progress note routed to referring physician's office. Patient acknowledges working in consult agreement with pharmacist as referred by his/her physician. Next INR Check:  12/30     Please call [facility] at [phone] with any questions.   Manju Malhotra, PharmD, BCPS  [facility]  [place]: [phone]  [facility]: [phone]  11/18/2021 1:21 PM    For Pharmacy Admin Tracking Only   Total # of Interventions Recommended: 0   Total # of Interventions Accepted: 0   Time Spent (min): 15

## 2021-12-27 DIAGNOSIS — I26.99 OTHER PULMONARY EMBOLISM WITHOUT ACUTE COR PULMONALE, UNSPECIFIED CHRONICITY (HCC): ICD-10-CM

## 2021-12-27 DIAGNOSIS — I10 ESSENTIAL HYPERTENSION, BENIGN: ICD-10-CM

## 2021-12-28 RX ORDER — WARFARIN SODIUM 5 MG/1
TABLET ORAL
Qty: 71 TABLET | Refills: 1 | Status: SHIPPED | OUTPATIENT
Start: 2021-12-28 | End: 2022-06-23

## 2021-12-28 RX ORDER — AMLODIPINE BESYLATE 5 MG/1
TABLET ORAL
Qty: 90 TABLET | Refills: 1 | Status: SHIPPED | OUTPATIENT
Start: 2021-12-28 | End: 2022-06-23

## 2021-12-30 ENCOUNTER — ANTI-COAG VISIT (OUTPATIENT)
Dept: PHARMACY | Age: 81
End: 2021-12-30
Payer: MEDICARE

## 2021-12-30 DIAGNOSIS — I26.99 PULMONARY EMBOLISM, UNSPECIFIED CHRONICITY, UNSPECIFIED PULMONARY EMBOLISM TYPE, UNSPECIFIED WHETHER ACUTE COR PULMONALE PRESENT (HCC): Primary | ICD-10-CM

## 2021-12-30 LAB — INTERNATIONAL NORMALIZATION RATIO, POC: 2.7

## 2021-12-30 PROCEDURE — 85610 PROTHROMBIN TIME: CPT | Performed by: PHARMACIST

## 2021-12-30 PROCEDURE — 99211 OFF/OP EST MAY X REQ PHY/QHP: CPT | Performed by: PHARMACIST

## 2021-12-30 NOTE — PROGRESS NOTES
ANTICOAGULATION SERVICE    Ana Laura Mehta is a 78 y.o. female with PMHx significant for DVT, PE, breast CA who presents to clinic on 12/30/2021 for anticoagulation monitoring and adjustment. 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     Anticoagulation Indication(s):  DVT, PE    Referring Physician:  Dr. James Roberts  Goal INR Range:  2-3  Duration of Anticoagulation Therapy:  Indefinite  Time of day dose taken:  AM  Product patient has at home:  warfarin 5 mg (peach)    INR Summary                            Warfarin regimen (mg)  Date INR   A/P    Sun Mon Tue Wed Thu Fri Sat Mg/wk  12/30  2.7       At goal, continue  2.5 5 2.5 5 2.5 5 5 27.5   11/18 2.3 At goal, continue   2.5 5 2.5 5 2.5 5 5 27.5  10/28 3.9 Above goal, hold x1  2.5 5 2.5 5 2.5 0/5 5 27.5  9/16 2.7 At goal, no change  2.5 5 2.5 5 2.5 5 5 27.5  8/26 3.1 Above goal, no change 2.5 5 2.5 5 2.5 5 5 27.5  6/30 2.1 At goal, no change  2.5 5 2.5 5 2.5 5 5 27.5  5/19 2.3 At goal, no change  2.5 5 2.5 5 2.5 5 5 27.5  4/7 2.4 At goal, no change  2.5 5 2.5 5 2.5 5 5 27.5  2/24 1.9 Below goal, continue  2.5 5 2.5 5 2.5 5 5 27.5  1/13 2.8 At goal, no change  2.5 5 2.5 5 2.5 5 5 27.5  12/2 2.3 At goal, no change  2.5 5 2.5 5 2.5 5 5 27.5  10/21 3.0 At goal, no change  2.5 5 2.5 5 2.5 5 5 27.5  9/9 2.8 At goal, no change  2.5 5 2.5 5 2.5 5 5 27.5  8/5 2.8 At goal, no change  2.5 5 2.5 5 2.5 5 5 27.5  7/2 2.3 At goal, no change  2.5 5 2.5 5 2.5 5 5 27.5  6/5 2.02 At goal, no change  2.5 5 2.5 5 2.5 5 5 27.5  5/4 2.44 At goal, no change  2.5 5 2.5 5 2.5 5 5 27.5  4/3 2.21 At goal, no change  2.5 5 2.5 5 2.5 5 5 27.5  3/6 1.8 Below goal, continue  2.5 5 2.5 5 2.5 5 5 27.5  2/7 2.5 At goal, no change  2.5 5 2.5 5 2.5 5 5 27.5  1/10 2.2 At goal, no change  2.5 5 2.5 5 2.5 5 5 27.5  12/19 2.2 At goal, no change  2.5 5 2.5 5 2.5 5 5 27.5    Last CBC:  Lab Results   Component Value Date    RBC 4.43 07/12/2021    HGB 13.7 07/12/2021    HCT 41.3 07/12/2021    MCV 93.3 07/12/2021    MCH 31.0 07/12/2021    MPV 9.5 07/12/2021    RDW 15.3 07/12/2021     07/12/2021       Patient History:  Recent hospitalizations/HC visits None reported   Recent medication changes None reported   Medications taken regularly that may interact with warfarin or alter INR No significant drug interactions identified   Warfarin dose taken as prescribed -Yes  -Patient has pillbox, but doesn't currently use it; she has a system for taking medications that works well   Signs/symptoms of bleeding No h/o major bleeding reported   Vitamin K intake Normally has ~1-2 serving of green, leafy vegetables per month (occasional broccoli or yohan greens)   Recent vomiting/diarrhea/fever, changes in weight or activity level None reported   Tobacco or alcohol use Patient denies both   Upcoming surgeries or procedures None reported     Assessment/Plan:  Patient's INR was therapeutic today (2.7). INR was likely previously elevated due to eating less vitamin K foods. She denies any missed/extra warfarin doses, diet changes, medication changes, illness, or bleeding since last visit. Patient was instructed to continue warfarin 2.5 mg on Sun/Tue/Thur, and 5 mg on all other days. Repeat INR in 6 weeks. Patient was reminded to maintain consistent vitamin K intake and call with any bleeding, medication changes, or fever/vomiting/diarrhea. Patient understands dosing directions and information discussed. Dosing schedule and follow up appointment given to patient. Progress note routed to referring physician's office. Patient acknowledges working in consult agreement with pharmacist as referred by his/her physician. Next INR Check:  2/10    Please call [facility] at [phone] with any questions.   Janet Fermin, PharmD Candidate 2022   [facility]  [place]: [address] [place] [address] Street: [phone]  12/30/2021 12:58 PM    For Pharmacy Admin Tracking Only   Total # of Interventions Recommended: 0   Total # of Interventions Accepted: 0   Time Spent (min): 15

## 2022-02-10 ENCOUNTER — ANTI-COAG VISIT (OUTPATIENT)
Dept: PHARMACY | Age: 82
End: 2022-02-10
Payer: MEDICARE

## 2022-02-10 DIAGNOSIS — I26.99 PULMONARY EMBOLISM, UNSPECIFIED CHRONICITY, UNSPECIFIED PULMONARY EMBOLISM TYPE, UNSPECIFIED WHETHER ACUTE COR PULMONALE PRESENT (HCC): Primary | ICD-10-CM

## 2022-02-10 LAB — INTERNATIONAL NORMALIZATION RATIO, POC: 2.2

## 2022-02-10 PROCEDURE — 99211 OFF/OP EST MAY X REQ PHY/QHP: CPT | Performed by: PHARMACIST

## 2022-02-10 PROCEDURE — 85610 PROTHROMBIN TIME: CPT | Performed by: PHARMACIST

## 2022-02-10 NOTE — PROGRESS NOTES
ANTICOAGULATION SERVICE    Chantelle Diaz is a 78 y.o. female with PMHx significant for DVT, PE, breast CA who presents to clinic on 2/10/2022 for anticoagulation monitoring and adjustment. 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     Anticoagulation Indication(s):  DVT, PE    Referring Physician:  Dr. Arlen Borges  Goal INR Range:  2-3  Duration of Anticoagulation Therapy:  Indefinite  Time of day dose taken:  AM  Product patient has at home:  warfarin 5 mg (peach)    INR Summary                            Warfarin regimen (mg)  Date INR   A/P    Sun Mon Tue Wed Thu Fri Sat Mg/wk  2/10  2.2       At goal, continue  2.5 5 2.5 5 2.5 5 5 27.5  12/30  2.7       At goal, continue  2.5 5 2.5 5 2.5 5 5 27.5   11/18 2.3 At goal, continue   2.5 5 2.5 5 2.5 5 5 27.5  10/28 3.9 Above goal, hold x1  2.5 5 2.5 5 2.5 0/5 5 27.5  9/16 2.7 At goal, no change  2.5 5 2.5 5 2.5 5 5 27.5  8/26 3.1 Above goal, no change 2.5 5 2.5 5 2.5 5 5 27.5  6/30 2.1 At goal, no change  2.5 5 2.5 5 2.5 5 5 27.5  5/19 2.3 At goal, no change  2.5 5 2.5 5 2.5 5 5 27.5  4/7 2.4 At goal, no change  2.5 5 2.5 5 2.5 5 5 27.5  2/24 1.9 Below goal, continue  2.5 5 2.5 5 2.5 5 5 27.5  1/13 2.8 At goal, no change  2.5 5 2.5 5 2.5 5 5 27.5  12/2 2.3 At goal, no change  2.5 5 2.5 5 2.5 5 5 27.5  10/21 3.0 At goal, no change  2.5 5 2.5 5 2.5 5 5 27.5  9/9 2.8 At goal, no change  2.5 5 2.5 5 2.5 5 5 27.5  8/5 2.8 At goal, no change  2.5 5 2.5 5 2.5 5 5 27.5  7/2 2.3 At goal, no change  2.5 5 2.5 5 2.5 5 5 27.5  6/5 2.02 At goal, no change  2.5 5 2.5 5 2.5 5 5 27.5  5/4 2.44 At goal, no change  2.5 5 2.5 5 2.5 5 5 27.5  4/3 2.21 At goal, no change  2.5 5 2.5 5 2.5 5 5 27.5  3/6 1.8 Below goal, continue  2.5 5 2.5 5 2.5 5 5 27.5  2/7 2.5 At goal, no change  2.5 5 2.5 5 2.5 5 5 27.5  1/10 2.2 At goal, no change  2.5 5 2.5 5 2.5 5 5 27.5  12/19 2.2 At goal, no change  2.5 5 2.5 5 2.5 5 5 27.5    Last CBC:  Lab Results   Component Value Date RBC 4.43 07/12/2021    HGB 13.7 07/12/2021    HCT 41.3 07/12/2021    MCV 93.3 07/12/2021    MCH 31.0 07/12/2021    MPV 9.5 07/12/2021    RDW 15.3 07/12/2021     07/12/2021       Patient History:  Recent hospitalizations/HC visits None reported   Recent medication changes None reported   Medications taken regularly that may interact with warfarin or alter INR No significant drug interactions identified   Warfarin dose taken as prescribed -Yes  -Patient has pillbox, but doesn't currently use it; she has a system for taking medications that works well   Signs/symptoms of bleeding No h/o major bleeding reported   Vitamin K intake Normally has ~1-2 serving of green, leafy vegetables per month (occasional broccoli or yohan greens)   Recent vomiting/diarrhea/fever, changes in weight or activity level None reported   Tobacco or alcohol use Patient denies both   Upcoming surgeries or procedures None reported     Assessment/Plan:  Patient's INR was therapeutic today (2.2). She denies any missed/extra warfarin doses, diet changes, medication changes, illness, or bleeding since last visit. Patient was instructed to continue warfarin 2.5 mg on Sun/Tue/Thur, and 5 mg on all other days. Repeat INR in 6 weeks. Patient was reminded to maintain consistent vitamin K intake and call with any bleeding, medication changes, or fever/vomiting/diarrhea. Patient understands dosing directions and information discussed. Dosing schedule and follow up appointment given to patient. Progress note routed to referring physician's office. Patient acknowledges working in consult agreement with pharmacist as referred by his/her physician. Next INR Check:  3/24    Please call [facility] at [phone] with any questions.   Edgar Handy, PharmD, BCPS  [facility]  [place]: [phone]  [place]: [phone]  2/10/2022 2:10 PM    For Pharmacy Admin Tracking Only   Total # of Interventions Recommended: 0   Total # of Interventions Accepted: 0   Time Spent (min): 15

## 2022-03-24 ENCOUNTER — ANTI-COAG VISIT (OUTPATIENT)
Dept: PHARMACY | Age: 82
End: 2022-03-24
Payer: MEDICARE

## 2022-03-24 DIAGNOSIS — I26.99 PULMONARY EMBOLISM, UNSPECIFIED CHRONICITY, UNSPECIFIED PULMONARY EMBOLISM TYPE, UNSPECIFIED WHETHER ACUTE COR PULMONALE PRESENT (HCC): Primary | ICD-10-CM

## 2022-03-24 LAB — INTERNATIONAL NORMALIZATION RATIO, POC: 2.7

## 2022-03-24 PROCEDURE — 85610 PROTHROMBIN TIME: CPT | Performed by: PHARMACIST

## 2022-03-24 PROCEDURE — 99211 OFF/OP EST MAY X REQ PHY/QHP: CPT | Performed by: PHARMACIST

## 2022-03-24 NOTE — PROGRESS NOTES
ANTICOAGULATION SERVICE    Babak Palm is a 78 y.o. female with PMHx significant for DVT, PE, breast CA who presents to clinic on 3/24/2022 for anticoagulation monitoring and adjustment. 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     Anticoagulation Indication(s):  DVT, PE    Referring Physician:  Dr. Camila Parsons  Goal INR Range:  2-3  Duration of Anticoagulation Therapy:  Indefinite  Time of day dose taken:  AM  Product patient has at home:  warfarin 5 mg (peach)    INR Summary                            Warfarin regimen (mg)  Date INR   A/P    Sun Mon Tue Wed Thu Fri Sat Mg/wk  3/24  2.7       At goal, continue  2.5 5 2.5 5 2.5 5 5 27.5  2/10  2.2       At goal, continue  2.5 5 2.5 5 2.5 5 5 27.5  12/30  2.7       At goal, continue  2.5 5 2.5 5 2.5 5 5 27.5   11/18 2.3 At goal, continue   2.5 5 2.5 5 2.5 5 5 27.5  10/28 3.9 Above goal, hold x1  2.5 5 2.5 5 2.5 0/5 5 27.5  9/16 2.7 At goal, no change  2.5 5 2.5 5 2.5 5 5 27.5  8/26 3.1 Above goal, no change 2.5 5 2.5 5 2.5 5 5 27.5  6/30 2.1 At goal, no change  2.5 5 2.5 5 2.5 5 5 27.5  5/19 2.3 At goal, no change  2.5 5 2.5 5 2.5 5 5 27.5  4/7 2.4 At goal, no change  2.5 5 2.5 5 2.5 5 5 27.5  2/24 1.9 Below goal, continue  2.5 5 2.5 5 2.5 5 5 27.5  1/13 2.8 At goal, no change  2.5 5 2.5 5 2.5 5 5 27.5  12/2 2.3 At goal, no change  2.5 5 2.5 5 2.5 5 5 27.5  10/21 3.0 At goal, no change  2.5 5 2.5 5 2.5 5 5 27.5  9/9 2.8 At goal, no change  2.5 5 2.5 5 2.5 5 5 27.5  8/5 2.8 At goal, no change  2.5 5 2.5 5 2.5 5 5 27.5  7/2 2.3 At goal, no change  2.5 5 2.5 5 2.5 5 5 27.5  6/5 2.02 At goal, no change  2.5 5 2.5 5 2.5 5 5 27.5  5/4 2.44 At goal, no change  2.5 5 2.5 5 2.5 5 5 27.5  4/3 2.21 At goal, no change  2.5 5 2.5 5 2.5 5 5 27.5  3/6 1.8 Below goal, continue  2.5 5 2.5 5 2.5 5 5 27.5  2/7 2.5 At goal, no change  2.5 5 2.5 5 2.5 5 5 27.5  1/10 2.2 At goal, no change  2.5 5 2.5 5 2.5 5 5 27.5  12/19 2.2 At goal, no change  2.5 5 2.5 5 2.5 5 5 27.5    Last CBC:  Lab Results   Component Value Date    RBC 4.43 07/12/2021    HGB 13.7 07/12/2021    HCT 41.3 07/12/2021    MCV 93.3 07/12/2021    MCH 31.0 07/12/2021    MPV 9.5 07/12/2021    RDW 15.3 07/12/2021     07/12/2021       Patient History:  Recent hospitalizations/HC visits None reported   Recent medication changes None reported   Medications taken regularly that may interact with warfarin or alter INR No significant drug interactions identified   Warfarin dose taken as prescribed -Yes  -Patient has pillbox, but doesn't currently use it; she has a system for taking medications that works well   Signs/symptoms of bleeding No h/o major bleeding reported   Vitamin K intake Normally has ~1-2 serving of green, leafy vegetables per month (occasional broccoli or yohan greens)   Recent vomiting/diarrhea/fever, changes in weight or activity level None reported   Tobacco or alcohol use Patient denies both   Upcoming surgeries or procedures None reported     Assessment/Plan:  Patient's INR was therapeutic today (2.7). She denies any missed/extra warfarin doses, diet changes, medication changes, illness, or bleeding since last visit. Patient was instructed to continue warfarin 2.5 mg on Sun/Tue/Thur, and 5 mg on all other days. Repeat INR in 6 weeks. Patient was reminded to maintain consistent vitamin K intake and call with any bleeding, medication changes, or fever/vomiting/diarrhea. Patient understands dosing directions and information discussed. Dosing schedule and follow up appointment given to patient. Progress note routed to referring physician's office. Patient acknowledges working in consult agreement with pharmacist as referred by his/her physician. Next INR Check:  5/5    Please call [facility] at [phone] with any questions.   Kyle Olivas, ManiD, BCPS  [facility]  [place]: [phone]  [place]: [phone]  3/24/2022 12:52 PM    For Pharmacy Admin Tracking Only   Total # of Interventions Recommended: 0   Total # of Interventions Accepted: 0   Time Spent (min): 15

## 2022-05-05 ENCOUNTER — ANTI-COAG VISIT (OUTPATIENT)
Dept: PHARMACY | Age: 82
End: 2022-05-05
Payer: MEDICARE

## 2022-05-05 DIAGNOSIS — I26.99 OTHER PULMONARY EMBOLISM WITHOUT ACUTE COR PULMONALE, UNSPECIFIED CHRONICITY (HCC): Primary | ICD-10-CM

## 2022-05-05 LAB — INTERNATIONAL NORMALIZATION RATIO, POC: 3.3

## 2022-05-05 PROCEDURE — 85610 PROTHROMBIN TIME: CPT | Performed by: PHARMACIST

## 2022-05-05 PROCEDURE — 99211 OFF/OP EST MAY X REQ PHY/QHP: CPT | Performed by: PHARMACIST

## 2022-05-05 NOTE — PROGRESS NOTES
ANTICOAGULATION SERVICE    Yuval Reyes is a 78 y.o. female with PMHx significant for DVT, PE, breast CA who presents to clinic on 5/5/2022 for anticoagulation monitoring and adjustment. 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     Anticoagulation Indication(s):  DVT, PE    Referring Physician:  Dr. Hali Moran  Goal INR Range:  2-3  Duration of Anticoagulation Therapy:  Indefinite  Time of day dose taken:  AM  Product patient has at home:  warfarin 5 mg (peach)    INR Summary                            Warfarin regimen (mg)  Date INR   A/P    Sun Mon Tue Wed Thu Fri Sat Mg/wk  5/5 3.3 Above goal, continue  2.5 5 2.5 5 2.5 5 5 27.5  3/24  2.7       At goal, continue  2.5 5 2.5 5 2.5 5 5 27.5  2/10  2.2       At goal, continue  2.5 5 2.5 5 2.5 5 5 27.5  12/30  2.7       At goal, continue  2.5 5 2.5 5 2.5 5 5 27.5   11/18 2.3 At goal, continue   2.5 5 2.5 5 2.5 5 5 27.5  10/28 3.9 Above goal, hold x1  2.5 5 2.5 5 2.5 0/5 5 27.5  9/16 2.7 At goal, no change  2.5 5 2.5 5 2.5 5 5 27.5  8/26 3.1 Above goal, no change 2.5 5 2.5 5 2.5 5 5 27.5  6/30 2.1 At goal, no change  2.5 5 2.5 5 2.5 5 5 27.5  5/19 2.3 At goal, no change  2.5 5 2.5 5 2.5 5 5 27.5  4/7 2.4 At goal, no change  2.5 5 2.5 5 2.5 5 5 27.5  2/24 1.9 Below goal, continue  2.5 5 2.5 5 2.5 5 5 27.5  1/13 2.8 At goal, no change  2.5 5 2.5 5 2.5 5 5 27.5  12/2 2.3 At goal, no change  2.5 5 2.5 5 2.5 5 5 27.5  10/21 3.0 At goal, no change  2.5 5 2.5 5 2.5 5 5 27.5  9/9 2.8 At goal, no change  2.5 5 2.5 5 2.5 5 5 27.5  8/5 2.8 At goal, no change  2.5 5 2.5 5 2.5 5 5 27.5  7/2 2.3 At goal, no change  2.5 5 2.5 5 2.5 5 5 27.5  6/5 2.02 At goal, no change  2.5 5 2.5 5 2.5 5 5 27.5  5/4 2.44 At goal, no change  2.5 5 2.5 5 2.5 5 5 27.5  4/3 2.21 At goal, no change  2.5 5 2.5 5 2.5 5 5 27.5  3/6 1.8 Below goal, continue  2.5 5 2.5 5 2.5 5 5 27.5  2/7 2.5 At goal, no change  2.5 5 2.5 5 2.5 5 5 27.5  1/10 2.2 At goal, no change  2.5 5 2.5 5 2.5 5 5 27.5  12/19 2.2 At goal, no change  2.5 5 2.5 5 2.5 5 5 27.5    Last CBC:  Lab Results   Component Value Date    RBC 4.43 07/12/2021    HGB 13.7 07/12/2021    HCT 41.3 07/12/2021    MCV 93.3 07/12/2021    MCH 31.0 07/12/2021    MPV 9.5 07/12/2021    RDW 15.3 07/12/2021     07/12/2021       Patient History:  Recent hospitalizations/HC visits None reported   Recent medication changes None reported   Medications taken regularly that may interact with warfarin or alter INR No significant drug interactions identified   Warfarin dose taken as prescribed -Yes  -Patient has pillbox, but doesn't currently use it; she has a system for taking medications that works well   Signs/symptoms of bleeding No h/o major bleeding reported   Vitamin K intake Normally has ~1-2 serving of green, leafy vegetables per month (occasional broccoli or yohan greens)   Recent vomiting/diarrhea/fever, changes in weight or activity level None reported   Tobacco or alcohol use Patient denies both   Upcoming surgeries or procedures None reported     Assessment/Plan:  Patient's INR was supratherapeutic today (3.3) likely due to decreased vitamin K intake. She denies any missed/extra warfarin doses, diet changes, medication changes, illness, or bleeding since last visit. Patient was instructed to continue warfarin 2.5 mg on Sun/Tue/Thur, and 5 mg on all other days. She will eat an extra serving of vitamin K foods this week. Repeat INR in 6 weeks per patient request. Patient was reminded to maintain consistent vitamin K intake and call with any bleeding, medication changes, or fever/vomiting/diarrhea. Patient understands dosing directions and information discussed. Dosing schedule and follow up appointment given to patient. Progress note routed to referring physician's office. Patient acknowledges working in consult agreement with pharmacist as referred by his/her physician.      Next INR Check: 6/18    Please call [facility] at [phone] with any questions.   Mani OreillyD, BCPS  [facility]  [place]: [address] [place] [address]: [phone]  5/5/2022 1:07 PM    For Pharmacy Admin Tracking Only   Total # of Interventions Recommended: 0   Total # of Interventions Accepted: 0   Time Spent (min): 15

## 2022-06-22 ENCOUNTER — ANTI-COAG VISIT (OUTPATIENT)
Dept: PHARMACY | Age: 82
End: 2022-06-22
Payer: MEDICARE

## 2022-06-22 DIAGNOSIS — I26.99 PULMONARY EMBOLISM, OTHER, UNSPECIFIED CHRONICITY, UNSPECIFIED WHETHER ACUTE COR PULMONALE PRESENT (HCC): Primary | ICD-10-CM

## 2022-06-22 LAB — INTERNATIONAL NORMALIZATION RATIO, POC: 2.3

## 2022-06-22 PROCEDURE — 85610 PROTHROMBIN TIME: CPT

## 2022-06-22 PROCEDURE — 99211 OFF/OP EST MAY X REQ PHY/QHP: CPT

## 2022-06-22 NOTE — PROGRESS NOTES
ANTICOAGULATION SERVICE    Lu Fair is a 78 y.o. female with PMHx significant for DVT, PE, breast CA who presents to clinic on 6/22/2022 for anticoagulation monitoring and adjustment. 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     Anticoagulation Indication(s):  DVT, PE    Referring Physician:  Dr. Lida Thorne  Goal INR Range:  2-3  Duration of Anticoagulation Therapy:  Indefinite  Time of day dose taken:  AM  Product patient has at home:  warfarin 5 mg (peach)    INR Summary                            Warfarin regimen (mg)  Date INR   A/P    Sun Mon Tue Wed Thu Fri Sat Mg/wk  6/22 2.3 At goal, no change  2.5 5 2.5 5 2.5 5 5 27.5  5/5 3.3 Above goal, continue  2.5 5 2.5 5 2.5 5 5 27.5  3/24  2.7       At goal, continue  2.5 5 2.5 5 2.5 5 5 27.5  2/10  2.2       At goal, continue  2.5 5 2.5 5 2.5 5 5 27.5  12/30  2.7       At goal, continue  2.5 5 2.5 5 2.5 5 5 27.5   11/18 2.3 At goal, continue   2.5 5 2.5 5 2.5 5 5 27.5  10/28 3.9 Above goal, hold x1  2.5 5 2.5 5 2.5 0/5 5 27.5  9/16 2.7 At goal, no change  2.5 5 2.5 5 2.5 5 5 27.5  8/26 3.1 Above goal, no change 2.5 5 2.5 5 2.5 5 5 27.5  6/30 2.1 At goal, no change  2.5 5 2.5 5 2.5 5 5 27.5  5/19 2.3 At goal, no change  2.5 5 2.5 5 2.5 5 5 27.5  4/7 2.4 At goal, no change  2.5 5 2.5 5 2.5 5 5 27.5  2/24 1.9 Below goal, continue  2.5 5 2.5 5 2.5 5 5 27.5  1/13 2.8 At goal, no change  2.5 5 2.5 5 2.5 5 5 27.5  12/2 2.3 At goal, no change  2.5 5 2.5 5 2.5 5 5 27.5  10/21 3.0 At goal, no change  2.5 5 2.5 5 2.5 5 5 27.5  9/9 2.8 At goal, no change  2.5 5 2.5 5 2.5 5 5 27.5  8/5 2.8 At goal, no change  2.5 5 2.5 5 2.5 5 5 27.5  7/2 2.3 At goal, no change  2.5 5 2.5 5 2.5 5 5 27.5  6/5 2.02 At goal, no change  2.5 5 2.5 5 2.5 5 5 27.5  5/4 2.44 At goal, no change  2.5 5 2.5 5 2.5 5 5 27.5  4/3 2.21 At goal, no change  2.5 5 2.5 5 2.5 5 5 27.5  3/6 1.8 Below goal, continue  2.5 5 2.5 5 2.5 5 5 27.5  2/7 2.5 At goal, no change  2.5 5 2.5 5 2.5 5 5 27.5  1/10 2.2 At goal, no change  2.5 5 2.5 5 2.5 5 5 27.5  12/19 2.2 At goal, no change  2.5 5 2.5 5 2.5 5 5 27.5    Last CBC:  Lab Results   Component Value Date    RBC 4.43 07/12/2021    HGB 13.7 07/12/2021    HCT 41.3 07/12/2021    MCV 93.3 07/12/2021    MCH 31.0 07/12/2021    MPV 9.5 07/12/2021    RDW 15.3 07/12/2021     07/12/2021       Patient History:  Recent hospitalizations/HC visits None reported   Recent medication changes None reported   Medications taken regularly that may interact with warfarin or alter INR No significant drug interactions identified   Warfarin dose taken as prescribed -Yes  -Patient has pillbox, but doesn't currently use it; she has a system for taking medications that works well   Signs/symptoms of bleeding No h/o major bleeding reported   Vitamin K intake Normally has ~1-2 serving of green, leafy vegetables per month (occasional broccoli or yohan greens)   Recent vomiting/diarrhea/fever, changes in weight or activity level None reported   Tobacco or alcohol use Patient denies both   Upcoming surgeries or procedures None reported     Assessment/Plan:  Patient's INR was therapeuitc today (2.3) after a supratherapeutic INR last visit. Increased INR last visit likely due to decreased vitamin K intake. She denies any missed/extra warfarin doses, diet changes, medication changes, illness, or bleeding since last visit. Patient was instructed to continue warfarin 2.5 mg on Sun/Tue/Thur, and 5 mg on all other days. Repeat INR in 6 weeks per patient request. Patient was reminded to maintain consistent vitamin K intake and call with any bleeding, medication changes, or fever/vomiting/diarrhea. Patient understands dosing directions and information discussed. Dosing schedule and follow up appointment given to patient. Progress note routed to referring physician's office.   Patient acknowledges working in consult agreement with pharmacist as referred by his/her physician. Next INR Check:  8/3    Please call [facility] at [phone] with any questions. Thanks!   Reyna Jimenes, PharmD  PGY1 Pharmacy Resident  [facility]  6/22/2022 9:36 AM    For Pharmacy Admin Tracking Only   Total # of Interventions Recommended: 0   Total # of Interventions Accepted: 0   Time Spent (min): 10

## 2022-06-23 DIAGNOSIS — I10 ESSENTIAL HYPERTENSION, BENIGN: ICD-10-CM

## 2022-06-23 DIAGNOSIS — I26.99 OTHER PULMONARY EMBOLISM WITHOUT ACUTE COR PULMONALE, UNSPECIFIED CHRONICITY (HCC): ICD-10-CM

## 2022-06-23 RX ORDER — AMLODIPINE BESYLATE 5 MG/1
TABLET ORAL
Qty: 90 TABLET | Refills: 1 | Status: SHIPPED | OUTPATIENT
Start: 2022-06-23

## 2022-06-23 RX ORDER — WARFARIN SODIUM 5 MG/1
TABLET ORAL
Qty: 71 TABLET | Refills: 1 | Status: SHIPPED | OUTPATIENT
Start: 2022-06-23

## 2022-08-03 ENCOUNTER — ANTI-COAG VISIT (OUTPATIENT)
Dept: PHARMACY | Age: 82
End: 2022-08-03
Payer: MEDICARE

## 2022-08-03 DIAGNOSIS — I26.99 OTHER PULMONARY EMBOLISM WITHOUT ACUTE COR PULMONALE, UNSPECIFIED CHRONICITY (HCC): Primary | ICD-10-CM

## 2022-08-03 LAB — INTERNATIONAL NORMALIZATION RATIO, POC: 2.7

## 2022-08-03 PROCEDURE — 99211 OFF/OP EST MAY X REQ PHY/QHP: CPT

## 2022-08-03 PROCEDURE — 85610 PROTHROMBIN TIME: CPT

## 2022-08-03 NOTE — PROGRESS NOTES
ANTICOAGULATION SERVICE    Brennen Rodriguez is a 78 y.o. female with PMHx significant for DVT, PE, breast CA who presents to clinic on 8/3/2022 for anticoagulation monitoring and adjustment. 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     Anticoagulation Indication(s):  DVT, PE    Referring Physician:  Dr. Jb Saavedra  Goal INR Range:  2-3  Duration of Anticoagulation Therapy:  Indefinite  Time of day dose taken:  AM  Product patient has at home:  warfarin 5 mg (peach)    INR Summary                            Warfarin regimen (mg)  Date INR   A/P    Sun Mon Tue Wed Thu Fri Sat Mg/wk  8/3 2.7 At goal, no change  2.5 5 2.5 5 2.5 5 5 27.5  6/22 2.3 At goal, no change  2.5 5 2.5 5 2.5 5 5 27.5  5/5 3.3 Above goal, continue  2.5 5 2.5 5 2.5 5 5 27.5  3/24  2.7       At goal, continue  2.5 5 2.5 5 2.5 5 5 27.5  2/10  2.2       At goal, continue  2.5 5 2.5 5 2.5 5 5 27.5  12/30  2.7       At goal, continue  2.5 5 2.5 5 2.5 5 5 27.5   11/18 2.3 At goal, continue   2.5 5 2.5 5 2.5 5 5 27.5  10/28 3.9 Above goal, hold x1  2.5 5 2.5 5 2.5 0/5 5 27.5  9/16 2.7 At goal, no change  2.5 5 2.5 5 2.5 5 5 27.5  8/26 3.1 Above goal, no change 2.5 5 2.5 5 2.5 5 5 27.5  6/30 2.1 At goal, no change  2.5 5 2.5 5 2.5 5 5 27.5  5/19 2.3 At goal, no change  2.5 5 2.5 5 2.5 5 5 27.5  4/7 2.4 At goal, no change  2.5 5 2.5 5 2.5 5 5 27.5  2/24 1.9 Below goal, continue  2.5 5 2.5 5 2.5 5 5 27.5  1/13 2.8 At goal, no change  2.5 5 2.5 5 2.5 5 5 27.5  12/2 2.3 At goal, no change  2.5 5 2.5 5 2.5 5 5 27.5  10/21 3.0 At goal, no change  2.5 5 2.5 5 2.5 5 5 27.5  9/9 2.8 At goal, no change  2.5 5 2.5 5 2.5 5 5 27.5  8/5 2.8 At goal, no change  2.5 5 2.5 5 2.5 5 5 27.5  7/2 2.3 At goal, no change  2.5 5 2.5 5 2.5 5 5 27.5  6/5 2.02 At goal, no change  2.5 5 2.5 5 2.5 5 5 27.5  5/4 2.44 At goal, no change  2.5 5 2.5 5 2.5 5 5 27.5  4/3 2.21 At goal, no change  2.5 5 2.5 5 2.5 5 5 27.5  3/6 1.8 Below goal, continue  2.5 5 2.5 5 2.5 5 5 27.5  2/7 2.5 At goal, no change  2.5 5 2.5 5 2.5 5 5 27.5  1/10 2.2 At goal, no change  2.5 5 2.5 5 2.5 5 5 27.5  12/19 2.2 At goal, no change  2.5 5 2.5 5 2.5 5 5 27.5    Last CBC:  Lab Results   Component Value Date    RBC 4.43 07/12/2021    HGB 13.7 07/12/2021    HCT 41.3 07/12/2021    MCV 93.3 07/12/2021    MCH 31.0 07/12/2021    MPV 9.5 07/12/2021    RDW 15.3 07/12/2021     07/12/2021       Patient History:  Recent hospitalizations/HC visits None reported   Recent medication changes None reported   Medications taken regularly that may interact with warfarin or alter INR No significant drug interactions identified   Warfarin dose taken as prescribed -Yes  -Patient has pillbox, but doesn't currently use it; she has a system for taking medications that works well   Signs/symptoms of bleeding No h/o major bleeding reported   Vitamin K intake Normally has ~1-2 serving of green, leafy vegetables per month (occasional broccoli or yohan greens)   Recent vomiting/diarrhea/fever, changes in weight or activity level None reported   Tobacco or alcohol use Patient denies both   Upcoming surgeries or procedures None reported     Assessment/Plan:  Patient's INR was therapeuitc today (2.7). She denies any missed/extra warfarin doses, diet changes, medication changes, illness, or bleeding since last visit. Patient was instructed to continue warfarin 2.5 mg on Sun/Tue/Thur, and 5 mg on all other days. Repeat INR in 6 weeks per patient request. Patient was reminded to maintain consistent vitamin K intake and call with any bleeding, medication changes, or fever/vomiting/diarrhea. Patient understands dosing directions and information discussed. Dosing schedule and follow up appointment given to patient. Progress note routed to referring physician's office. Patient acknowledges working in consult agreement with pharmacist as referred by his/her physician.      Next INR Check: 9/14    Please call [facility] at [phone] with any questions. Thanks!   Hali South, PharmD  PGY-1 Pharmacy Resident  The [address] [place] [address] Drive  [id number]  8/3/2022   1:11 PM    For Pharmacy Admin Tracking Only    Total # of Interventions Recommended: 0  Total # of Interventions Accepted: 0  Time Spent (min): 15

## 2022-09-14 ENCOUNTER — ANTI-COAG VISIT (OUTPATIENT)
Dept: PHARMACY | Age: 82
End: 2022-09-14
Payer: MEDICARE

## 2022-09-14 DIAGNOSIS — I26.99 OTHER PULMONARY EMBOLISM WITHOUT ACUTE COR PULMONALE, UNSPECIFIED CHRONICITY (HCC): Primary | ICD-10-CM

## 2022-09-14 LAB — INTERNATIONAL NORMALIZATION RATIO, POC: 2.5

## 2022-09-14 PROCEDURE — 85610 PROTHROMBIN TIME: CPT

## 2022-09-14 PROCEDURE — 99211 OFF/OP EST MAY X REQ PHY/QHP: CPT

## 2022-09-14 NOTE — PROGRESS NOTES
ANTICOAGULATION SERVICE    Brennen Rodriguez is a 78 y.o. female with PMHx significant for DVT, PE, breast CA who presents to clinic on 9/14/2022 for anticoagulation monitoring and adjustment. 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     Anticoagulation Indication(s):  DVT, PE    Referring Physician:  Dr. Jb Saavedra  Goal INR Range:  2-3  Duration of Anticoagulation Therapy:  Indefinite  Time of day dose taken:  AM  Product patient has at home:  warfarin 5 mg (peach)    INR Summary                            Warfarin regimen (mg)  Date INR   A/P    Sun Mon Tue Wed Thu Fri Sat Mg/wk  9/14 2.5 At goal, no change  2.5 5 2.5 5 2.5 5 5 27.5  8/3 2.7 At goal, no change  2.5 5 2.5 5 2.5 5 5 27.5  6/22 2.3 At goal, no change  2.5 5 2.5 5 2.5 5 5 27.5  5/5 3.3 Above goal, continue  2.5 5 2.5 5 2.5 5 5 27.5  3/24  2.7       At goal, continue  2.5 5 2.5 5 2.5 5 5 27.5  2/10  2.2       At goal, continue  2.5 5 2.5 5 2.5 5 5 27.5  12/30  2.7       At goal, continue  2.5 5 2.5 5 2.5 5 5 27.5   11/18 2.3 At goal, continue   2.5 5 2.5 5 2.5 5 5 27.5  10/28 3.9 Above goal, hold x1  2.5 5 2.5 5 2.5 0/5 5 27.5  9/16 2.7 At goal, no change  2.5 5 2.5 5 2.5 5 5 27.5  8/26 3.1 Above goal, no change 2.5 5 2.5 5 2.5 5 5 27.5  6/30 2.1 At goal, no change  2.5 5 2.5 5 2.5 5 5 27.5  5/19 2.3 At goal, no change  2.5 5 2.5 5 2.5 5 5 27.5  4/7 2.4 At goal, no change  2.5 5 2.5 5 2.5 5 5 27.5  2/24 1.9 Below goal, continue  2.5 5 2.5 5 2.5 5 5 27.5  1/13 2.8 At goal, no change  2.5 5 2.5 5 2.5 5 5 27.5  12/2 2.3 At goal, no change  2.5 5 2.5 5 2.5 5 5 27.5  10/21 3.0 At goal, no change  2.5 5 2.5 5 2.5 5 5 27.5  9/9 2.8 At goal, no change  2.5 5 2.5 5 2.5 5 5 27.5  8/5 2.8 At goal, no change  2.5 5 2.5 5 2.5 5 5 27.5  7/2 2.3 At goal, no change  2.5 5 2.5 5 2.5 5 5 27.5  6/5 2.02 At goal, no change  2.5 5 2.5 5 2.5 5 5 27.5  5/4 2.44 At goal, no change  2.5 5 2.5 5 2.5 5 5 27.5  4/3 2.21 At goal, no referred by his/her physician. Next INR Check:  10/26     Please call [facility] at [phone] with any questions. Thanks!   Oliva Bence, PharmD  PGY-1 Pharmacy Resident  [facility]  [id number]  9/14/2022   1:36 PM    For Pharmacy Admin Tracking Only    Total # of Interventions Recommended: 0  Total # of Interventions Accepted: 0  Time Spent (min): 15

## 2022-10-26 ENCOUNTER — ANTI-COAG VISIT (OUTPATIENT)
Dept: PHARMACY | Age: 82
End: 2022-10-26
Payer: MEDICARE

## 2022-10-26 DIAGNOSIS — I26.99 OTHER PULMONARY EMBOLISM WITHOUT ACUTE COR PULMONALE, UNSPECIFIED CHRONICITY (HCC): Primary | ICD-10-CM

## 2022-10-26 LAB — INTERNATIONAL NORMALIZATION RATIO, POC: 3.2

## 2022-10-26 PROCEDURE — 85610 PROTHROMBIN TIME: CPT

## 2022-10-26 PROCEDURE — 99211 OFF/OP EST MAY X REQ PHY/QHP: CPT

## 2022-10-26 NOTE — PROGRESS NOTES
ANTICOAGULATION SERVICE    Magdaline Meigs is a 78 y.o. female with PMHx significant for DVT, PE, breast CA who presents to clinic on 10/26/2022 for anticoagulation monitoring and adjustment. 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     Anticoagulation Indication(s):  DVT, PE    Referring Physician:  Dr. Jose Potts  Goal INR Range:  2-3  Duration of Anticoagulation Therapy:  Indefinite  Time of day dose taken:  AM  Product patient has at home:  warfarin 5 mg (peach)    INR Summary                            Warfarin regimen (mg)  Date INR   A/P    Sun Mon Tue Wed Thu Fri Sat Mg/wk  10/26 3.2 At goal, no change  2.5 5 2.5 5 2.5 5 5 27.5  9/14 2.5 At goal, no change  2.5 5 2.5 5 2.5 5 5 27.5  8/3 2.7 At goal, no change  2.5 5 2.5 5 2.5 5 5 27.5  6/22 2.3 At goal, no change  2.5 5 2.5 5 2.5 5 5 27.5  5/5 3.3 Above goal, continue  2.5 5 2.5 5 2.5 5 5 27.5  3/24  2.7       At goal, continue  2.5 5 2.5 5 2.5 5 5 27.5  2/10  2.2       At goal, continue  2.5 5 2.5 5 2.5 5 5 27.5  12/30  2.7       At goal, continue  2.5 5 2.5 5 2.5 5 5 27.5   11/18 2.3 At goal, continue   2.5 5 2.5 5 2.5 5 5 27.5  10/28 3.9 Above goal, hold x1  2.5 5 2.5 5 2.5 0/5 5 27.5  9/16 2.7 At goal, no change  2.5 5 2.5 5 2.5 5 5 27.5  8/26 3.1 Above goal, no change 2.5 5 2.5 5 2.5 5 5 27.5  6/30 2.1 At goal, no change  2.5 5 2.5 5 2.5 5 5 27.5  5/19 2.3 At goal, no change  2.5 5 2.5 5 2.5 5 5 27.5  4/7 2.4 At goal, no change  2.5 5 2.5 5 2.5 5 5 27.5  2/24 1.9 Below goal, continue  2.5 5 2.5 5 2.5 5 5 27.5  1/13 2.8 At goal, no change  2.5 5 2.5 5 2.5 5 5 27.5  12/2 2.3 At goal, no change  2.5 5 2.5 5 2.5 5 5 27.5  10/21 3.0 At goal, no change  2.5 5 2.5 5 2.5 5 5 27.5  9/9 2.8 At goal, no change  2.5 5 2.5 5 2.5 5 5 27.5  8/5 2.8 At goal, no change  2.5 5 2.5 5 2.5 5 5 27.5  7/2 2.3 At goal, no change  2.5 5 2.5 5 2.5 5 5 27.5  6/5 2.02 At goal, no change  2.5 5 2.5 5 2.5 5 5 27.5  5/4 2.44 At goal, no change  2.5 5 2.5 5 2.5 5 5 27.5  4/3 2.21 At goal, no change  2.5 5 2.5 5 2.5 5 5 27.5  3/6 1.8 Below goal, continue  2.5 5 2.5 5 2.5 5 5 27.5  2/7 2.5 At goal, no change  2.5 5 2.5 5 2.5 5 5 27.5  1/10 2.2 At goal, no change  2.5 5 2.5 5 2.5 5 5 27.5  12/19 2.2 At goal, no change  2.5 5 2.5 5 2.5 5 5 27.5    Last CBC:  Lab Results   Component Value Date    RBC 4.43 07/12/2021    HGB 13.7 07/12/2021    HCT 41.3 07/12/2021    MCV 93.3 07/12/2021    MCH 31.0 07/12/2021    MPV 9.5 07/12/2021    RDW 15.3 07/12/2021     07/12/2021       Patient History:  Recent hospitalizations/HC visits None reported   Recent medication changes None reported   Medications taken regularly that may interact with warfarin or alter INR No significant drug interactions identified   Warfarin dose taken as prescribed -Yes  -Patient has pillbox, but doesn't currently use it; she has a system for taking medications that works well   Signs/symptoms of bleeding No h/o major bleeding reported   Vitamin K intake Normally has ~1-2 serving of green, leafy vegetables per month (occasional broccoli or yohan greens)   Recent vomiting/diarrhea/fever, changes in weight or activity level None reported   Tobacco or alcohol use Patient denies both   Upcoming surgeries or procedures None reported     Assessment/Plan:  Patient's INR was supratherapeuitc today (3.2). Patient stated she has not been eating as many green leafy vegetables as she normally does. She stated she will resume eating her usual 1-2 servings of green leafy vegetables a week. She denies any missed/extra warfarin doses, medication changes, illness, or bleeding since last visit. Patient was instructed to continue warfarin 2.5 mg on Sun/Tue/Thur, and 5 mg on all other days. Repeat INR in 6 weeks per patient request. Patient was reminded to maintain consistent vitamin K intake and call with any bleeding, medication changes, or fever/vomiting/diarrhea.      Patient understands dosing directions and information discussed. Dosing schedule and follow up appointment given to patient. Progress note routed to referring physician's office. Patient acknowledges working in consult agreement with pharmacist as referred by his/her physician. Next INR Check:  12/07     Please call [facility] at [phone] with any questions. Thanks!   Lucero Kimbrough, PharmD  PGY-1 Pharmacy Resident  [facility]  [id number]  10/26/2022   12:54 PM    For Pharmacy Admin Tracking Only    Total # of Interventions Recommended: 0  Total # of Interventions Accepted: 0  Time Spent (min): 15

## 2022-12-07 ENCOUNTER — ANTI-COAG VISIT (OUTPATIENT)
Dept: PHARMACY | Age: 82
End: 2022-12-07
Payer: MEDICARE

## 2022-12-07 DIAGNOSIS — I26.99 OTHER PULMONARY EMBOLISM WITHOUT ACUTE COR PULMONALE, UNSPECIFIED CHRONICITY (HCC): Primary | ICD-10-CM

## 2022-12-07 LAB — INTERNATIONAL NORMALIZATION RATIO, POC: 2.6

## 2022-12-07 PROCEDURE — 99211 OFF/OP EST MAY X REQ PHY/QHP: CPT | Performed by: PHARMACIST

## 2022-12-07 PROCEDURE — 85610 PROTHROMBIN TIME: CPT | Performed by: PHARMACIST

## 2022-12-07 NOTE — PROGRESS NOTES
change  2.5 5 2.5 5 2.5 5 5 27.5  5/4 2.44 At goal, no change  2.5 5 2.5 5 2.5 5 5 27.5  4/3 2.21 At goal, no change  2.5 5 2.5 5 2.5 5 5 27.5  3/6 1.8 Below goal, continue  2.5 5 2.5 5 2.5 5 5 27.5  2/7 2.5 At goal, no change  2.5 5 2.5 5 2.5 5 5 27.5  1/10 2.2 At goal, no change  2.5 5 2.5 5 2.5 5 5 27.5  12/19 2.2 At goal, no change  2.5 5 2.5 5 2.5 5 5 27.5    Last CBC:  Lab Results   Component Value Date    RBC 4.43 07/12/2021    HGB 13.7 07/12/2021    HCT 41.3 07/12/2021    MCV 93.3 07/12/2021    MCH 31.0 07/12/2021    MPV 9.5 07/12/2021    RDW 15.3 07/12/2021     07/12/2021       Patient History:  Recent hospitalizations/HC visits None reported   Recent medication changes None reported   Medications taken regularly that may interact with warfarin or alter INR No significant drug interactions identified   Warfarin dose taken as prescribed -Yes  -Patient has pillbox, but doesn't currently use it; she has a system for taking medications that works well   Signs/symptoms of bleeding No h/o major bleeding reported   Vitamin K intake Normally has ~1-2 serving of green, leafy vegetables per month (occasional broccoli or yohan greens)   Recent vomiting/diarrhea/fever, changes in weight or activity level None reported   Tobacco or alcohol use Patient denies both   Upcoming surgeries or procedures None reported     Assessment/Plan:  Patient's INR was therapeutic today (2.6). She denies any missed/extra warfarin doses, medication changes, illness, or bleeding since last visit. Patient was instructed to continue warfarin 2.5 mg on Sun/Tue/Thur, and 5 mg on all other days. Repeat INR in 6 weeks per patient request. Patient was reminded to maintain consistent vitamin K intake and call with any bleeding, medication changes, or fever/vomiting/diarrhea. Patient understands dosing directions and information discussed. Dosing schedule and follow up appointment given to patient.   Progress note routed to referring physician's office. Patient acknowledges working in consult agreement with pharmacist as referred by his/her physician. Next INR Check:  1/18      Please call [facility] at [phone] with any questions. Thanks!   Adarsh Up, PharmD, [facility]  [facility]  [place]: [phone]  [place]: [phone]  12/7/2022 1:38 PM      For Pharmacy Admin Tracking Only    Total # of Interventions Recommended: 0  Total # of Interventions Accepted: 0  Time Spent (min): 15

## 2022-12-19 DIAGNOSIS — I10 ESSENTIAL HYPERTENSION, BENIGN: ICD-10-CM

## 2022-12-19 DIAGNOSIS — I26.99 OTHER PULMONARY EMBOLISM WITHOUT ACUTE COR PULMONALE, UNSPECIFIED CHRONICITY (HCC): ICD-10-CM

## 2022-12-19 RX ORDER — AMLODIPINE BESYLATE 5 MG/1
TABLET ORAL
Qty: 90 TABLET | Refills: 1 | Status: SHIPPED | OUTPATIENT
Start: 2022-12-19

## 2022-12-19 RX ORDER — WARFARIN SODIUM 5 MG/1
TABLET ORAL
Qty: 71 TABLET | Refills: 1 | Status: SHIPPED | OUTPATIENT
Start: 2022-12-19

## 2023-01-18 ENCOUNTER — ANTI-COAG VISIT (OUTPATIENT)
Dept: PHARMACY | Age: 83
End: 2023-01-18
Payer: MEDICARE

## 2023-01-18 DIAGNOSIS — I26.99 OTHER PULMONARY EMBOLISM WITHOUT ACUTE COR PULMONALE, UNSPECIFIED CHRONICITY (HCC): Primary | ICD-10-CM

## 2023-01-18 LAB — INTERNATIONAL NORMALIZATION RATIO, POC: 3.1

## 2023-01-18 PROCEDURE — 99211 OFF/OP EST MAY X REQ PHY/QHP: CPT

## 2023-01-18 PROCEDURE — 85610 PROTHROMBIN TIME: CPT

## 2023-01-18 NOTE — PROGRESS NOTES
ANTICOAGULATION SERVICE    Padma Feng is a 78 y.o. female with PMHx significant for DVT, PE, breast CA who presents to clinic on 1/18/2023 for anticoagulation monitoring and adjustment. 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     Anticoagulation Indication(s):  DVT, PE    Referring Physician:  Dr. Serrano Dose  Goal INR Range:  2-3  Duration of Anticoagulation Therapy:  Indefinite  Time of day dose taken:  AM  Product patient has at home:  warfarin 5 mg (peach)    INR Summary                            Warfarin regimen (mg)  Date INR   A/P    Sun Mon Tue Wed Thu Fri Sat Mg/wk  1/18 3.1 Above goal, no change 2.5 5 2.5 5 2.5 5 5 27.5  12/7 2.6 At goal, no change  2.5 5 2.5 5 2.5 5 5 27.5  10/26 3.2 At goal, no change  2.5 5 2.5 5 2.5 5 5 27.5  9/14 2.5 At goal, no change  2.5 5 2.5 5 2.5 5 5 27.5  8/3 2.7 At goal, no change  2.5 5 2.5 5 2.5 5 5 27.5  6/22 2.3 At goal, no change  2.5 5 2.5 5 2.5 5 5 27.5  5/5 3.3 Above goal, continue  2.5 5 2.5 5 2.5 5 5 27.5  3/24  2.7       At goal, continue  2.5 5 2.5 5 2.5 5 5 27.5  2/10  2.2       At goal, continue  2.5 5 2.5 5 2.5 5 5 27.5  12/30  2.7       At goal, continue  2.5 5 2.5 5 2.5 5 5 27.5   11/18 2.3 At goal, continue   2.5 5 2.5 5 2.5 5 5 27.5  10/28 3.9 Above goal, hold x1  2.5 5 2.5 5 2.5 0/5 5 27.5  9/16 2.7 At goal, no change  2.5 5 2.5 5 2.5 5 5 27.5  8/26 3.1 Above goal, no change 2.5 5 2.5 5 2.5 5 5 27.5  6/30 2.1 At goal, no change  2.5 5 2.5 5 2.5 5 5 27.5  5/19 2.3 At goal, no change  2.5 5 2.5 5 2.5 5 5 27.5  4/7 2.4 At goal, no change  2.5 5 2.5 5 2.5 5 5 27.5  2/24 1.9 Below goal, continue  2.5 5 2.5 5 2.5 5 5 27.5  1/13 2.8 At goal, no change  2.5 5 2.5 5 2.5 5 5 27.5  12/2 2.3 At goal, no change  2.5 5 2.5 5 2.5 5 5 27.5  10/21 3.0 At goal, no change  2.5 5 2.5 5 2.5 5 5 27.5  9/9 2.8 At goal, no change  2.5 5 2.5 5 2.5 5 5 27.5  8/5 2.8 At goal, no change  2.5 5 2.5 5 2.5 5 5 27.5  7/2 2.3 At goal, no change  2.5 5 2.5 5 2.5 5 5 27.5  6/5 2.02 At goal, no change  2.5 5 2.5 5 2.5 5 5 27.5  5/4 2.44 At goal, no change  2.5 5 2.5 5 2.5 5 5 27.5  4/3 2.21 At goal, no change  2.5 5 2.5 5 2.5 5 5 27.5  3/6 1.8 Below goal, continue  2.5 5 2.5 5 2.5 5 5 27.5  2/7 2.5 At goal, no change  2.5 5 2.5 5 2.5 5 5 27.5  1/10 2.2 At goal, no change  2.5 5 2.5 5 2.5 5 5 27.5  12/19 2.2 At goal, no change  2.5 5 2.5 5 2.5 5 5 27.5    Last CBC:  Lab Results   Component Value Date    RBC 4.43 07/12/2021    HGB 13.7 07/12/2021    HCT 41.3 07/12/2021    MCV 93.3 07/12/2021    MCH 31.0 07/12/2021    MPV 9.5 07/12/2021    RDW 15.3 07/12/2021     07/12/2021       Patient History:  Recent hospitalizations/HC visits None reported   Recent medication changes None reported   Medications taken regularly that may interact with warfarin or alter INR No significant drug interactions identified   Warfarin dose taken as prescribed -Yes  -Patient has pillbox, but doesn't currently use it; she has a system for taking medications that works well   Signs/symptoms of bleeding No h/o major bleeding reported   Vitamin K intake Normally has ~1-2 serving of green, leafy vegetables per month (occasional broccoli or yohan greens)   Recent vomiting/diarrhea/fever, changes in weight or activity level None reported   Tobacco or alcohol use Patient denies both   Upcoming surgeries or procedures None reported     Assessment/Plan:  Patient's INR was slightly supratherapeutic (3.1). She denies any missed/extra warfarin doses, medication changes, illness, or bleeding since last visit. Patient was instructed to continue warfarin 2.5 mg on Sun/Tue/Thur, and 5 mg on all other days. Repeat INR in 6 weeks per patient request. Patient was reminded to maintain consistent vitamin K intake and call with any bleeding, medication changes, or fever/vomiting/diarrhea. Patient understands dosing directions and information discussed.   Dosing schedule and follow up appointment given to patient. Progress note routed to referring physician's office. Patient acknowledges working in consult agreement with pharmacist as referred by his/her physician. Next INR Check:  3/1      Please call [facility] at [phone] with any questions. Thanks!   Gertrudis Angel, PharmD  PGY-1 Pharmacy Resident  [facility] [place]  [id number]  1/18/2023   1:18 PM      For Pharmacy Admin Tracking Only    Total # of Interventions Recommended: 0  Total # of Interventions Accepted: 0  Time Spent (min): 15

## 2023-03-01 ENCOUNTER — ANTI-COAG VISIT (OUTPATIENT)
Dept: PHARMACY | Age: 83
End: 2023-03-01
Payer: MEDICARE

## 2023-03-01 DIAGNOSIS — I26.99 OTHER PULMONARY EMBOLISM WITHOUT ACUTE COR PULMONALE, UNSPECIFIED CHRONICITY (HCC): Primary | ICD-10-CM

## 2023-03-01 LAB — INTERNATIONAL NORMALIZATION RATIO, POC: 3.4

## 2023-03-01 PROCEDURE — 99212 OFFICE O/P EST SF 10 MIN: CPT

## 2023-03-01 PROCEDURE — 85610 PROTHROMBIN TIME: CPT

## 2023-03-01 NOTE — PROGRESS NOTES
ANTICOAGULATION SERVICE    Yosi Herron is a 78 y.o. female with PMHx significant for DVT, PE, breast CA who presents to clinic on 3/1/2023 for anticoagulation monitoring and adjustment. 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     Anticoagulation Indication(s):  DVT, PE    Referring Physician:  Dr. Susan Jimenez  Goal INR Range:  2-3  Duration of Anticoagulation Therapy:  Indefinite  Time of day dose taken:  AM  Product patient has at home:  warfarin 5 mg (peach)    INR Summary                            Warfarin regimen (mg)  Date INR   A/P    Sun Mon Tue Wed Thu Fri Sat Mg/wk  3/1 3.4 Above goal, no change 2.5 5 2.5 5 2.5 5 5 27.5  1/18 3.1 Above goal, no change 2.5 5 2.5 5 2.5 5 5 27.5  12/7 2.6 At goal, no change  2.5 5 2.5 5 2.5 5 5 27.5  10/26 3.2 At goal, no change  2.5 5 2.5 5 2.5 5 5 27.5  9/14 2.5 At goal, no change  2.5 5 2.5 5 2.5 5 5 27.5  8/3 2.7 At goal, no change  2.5 5 2.5 5 2.5 5 5 27.5  6/22 2.3 At goal, no change  2.5 5 2.5 5 2.5 5 5 27.5  5/5 3.3 Above goal, continue  2.5 5 2.5 5 2.5 5 5 27.5  3/24  2.7       At goal, continue  2.5 5 2.5 5 2.5 5 5 27.5  2/10  2.2       At goal, continue  2.5 5 2.5 5 2.5 5 5 27.5  12/30  2.7       At goal, continue  2.5 5 2.5 5 2.5 5 5 27.5   11/18 2.3 At goal, continue   2.5 5 2.5 5 2.5 5 5 27.5  10/28 3.9 Above goal, hold x1  2.5 5 2.5 5 2.5 0/5 5 27.5  9/16 2.7 At goal, no change  2.5 5 2.5 5 2.5 5 5 27.5  8/26 3.1 Above goal, no change 2.5 5 2.5 5 2.5 5 5 27.5  6/30 2.1 At goal, no change  2.5 5 2.5 5 2.5 5 5 27.5  5/19 2.3 At goal, no change  2.5 5 2.5 5 2.5 5 5 27.5  4/7 2.4 At goal, no change  2.5 5 2.5 5 2.5 5 5 27.5  2/24 1.9 Below goal, continue  2.5 5 2.5 5 2.5 5 5 27.5  1/13 2.8 At goal, no change  2.5 5 2.5 5 2.5 5 5 27.5  12/2 2.3 At goal, no change  2.5 5 2.5 5 2.5 5 5 27.5  10/21 3.0 At goal, no change  2.5 5 2.5 5 2.5 5 5 27.5  9/9 2.8 At goal, no change  2.5 5 2.5 5 2.5 5 5 27.5  8/5 2.8 At goal, no change  2.5 5 2.5 5 2.5 5 5 27.5  7/2 2.3 At goal, no change  2.5 5 2.5 5 2.5 5 5 27.5  6/5 2.02 At goal, no change  2.5 5 2.5 5 2.5 5 5 27.5  5/4 2.44 At goal, no change  2.5 5 2.5 5 2.5 5 5 27.5  4/3 2.21 At goal, no change  2.5 5 2.5 5 2.5 5 5 27.5  3/6 1.8 Below goal, continue  2.5 5 2.5 5 2.5 5 5 27.5  2/7 2.5 At goal, no change  2.5 5 2.5 5 2.5 5 5 27.5  1/10 2.2 At goal, no change  2.5 5 2.5 5 2.5 5 5 27.5  12/19 2.2 At goal, no change  2.5 5 2.5 5 2.5 5 5 27.5    Last CBC:  Lab Results   Component Value Date    RBC 4.43 07/12/2021    HGB 13.7 07/12/2021    HCT 41.3 07/12/2021    MCV 93.3 07/12/2021    MCH 31.0 07/12/2021    MPV 9.5 07/12/2021    RDW 15.3 07/12/2021     07/12/2021       Patient History:  Recent hospitalizations/HC visits None reported   Recent medication changes None reported   Medications taken regularly that may interact with warfarin or alter INR No significant drug interactions identified   Warfarin dose taken as prescribed -Yes  -Patient has pillbox, but doesn't currently use it; she has a system for taking medications that works well   Signs/symptoms of bleeding No h/o major bleeding reported   Vitamin K intake Normally has ~1-2 serving of green, leafy vegetables per month (occasional broccoli or yohan greens)   Recent vomiting/diarrhea/fever, changes in weight or activity level None reported   Tobacco or alcohol use Patient denies both   Upcoming surgeries or procedures None reported     Assessment/Plan:  Patient's INR was slightly supratherapeutic (3.4). She denies any missed/extra warfarin doses, medication changes, illness, or bleeding since last visit. She does states she has not been eating as many green leafy vegetables as she normally does. Encouraged patient to stay consistent with her green leafy vegetables.     Patient already took her dose of warfarin today, so she was instructed to hold her dose tomorrow (03/02/23) and then continue warfarin 2.5 mg on Sun/Tue/Thur, and 5 mg on all other days. Patient usually comes every 6 weeks per her request, but given slight increase in INR the last couple of visit, will repeat INR in 4 weeks. Patient was reminded to maintain consistent vitamin K intake and call with any bleeding, medication changes, or fever/vomiting/diarrhea. Patient understands dosing directions and information discussed. Dosing schedule and follow up appointment given to patient. Progress note routed to referring physician's office. Patient acknowledges working in consult agreement with pharmacist as referred by his/her physician. Next INR Check:  3/29     Please call [facility] at [phone] with any questions. Thanks!   Malika Ortiz, PharmD  PGY-1 Pharmacy Resident  [facility] [place]  [id number]  3/1/2023   1:15 PM    For Pharmacy Admin Tracking Only    Intervention Detail: Dose Adjustment: 1, reason: Therapy De-escalation  Total # of Interventions Recommended: 1  Total # of Interventions Accepted: 1  Time Spent (min): 15

## 2023-03-29 ENCOUNTER — ANTI-COAG VISIT (OUTPATIENT)
Dept: PHARMACY | Age: 83
End: 2023-03-29
Payer: MEDICARE

## 2023-03-29 DIAGNOSIS — I26.99 OTHER PULMONARY EMBOLISM WITHOUT ACUTE COR PULMONALE, UNSPECIFIED CHRONICITY (HCC): Primary | ICD-10-CM

## 2023-03-29 LAB — INTERNATIONAL NORMALIZATION RATIO, POC: 2.6

## 2023-03-29 PROCEDURE — 99211 OFF/OP EST MAY X REQ PHY/QHP: CPT

## 2023-03-29 PROCEDURE — 85610 PROTHROMBIN TIME: CPT

## 2023-03-29 NOTE — PROGRESS NOTES
in 6 weeks. Patient was reminded to maintain consistent vitamin K intake and call with any bleeding, medication changes, or fever/vomiting/diarrhea. Patient understands dosing directions and information discussed. Dosing schedule and follow up appointment given to patient. Progress note routed to referring physician's office. Patient acknowledges working in consult agreement with pharmacist as referred by his/her physician. Next INR Check:  5/10     Please call [facility] at [phone] with any questions. Thanks!   Zev Springer, PharmD  PGY-1 Pharmacy Resident  [facility]  [id number]  3/29/2023   1:03 PM    For Pharmacy Admin Tracking Only  Time Spent (min): 15

## 2023-04-07 ENCOUNTER — TELEPHONE (OUTPATIENT)
Dept: INTERNAL MEDICINE CLINIC | Age: 83
End: 2023-04-07

## 2023-04-07 NOTE — TELEPHONE ENCOUNTER
----- Message from Gerardo Nixon sent at 4/7/2023 11:58 AM EDT -----  Subject: Appointment Request    Reason for Call: Established Patient Appointment needed: Routine Medicare   AWV    QUESTIONS    Reason for appointment request? Available appointments did not meet   patient need     Additional Information for Provider? pt is looking to be seen ASAP, no   available appts, check up as well as issues with her leg  ---------------------------------------------------------------------------  --------------  Brokoe Thornton INFO  [phone]; OK to leave message on voicemail  ---------------------------------------------------------------------------  --------------  SCRIPT ANSWERS  COVID Screen: Porsche Gamble

## 2023-04-07 NOTE — TELEPHONE ENCOUNTER
Wants physical therapy. Feeling weakness in her legs and knees. Demographics, med list, office note, in note put reason for the physical therapy, what is the reason not just symptom but the actual reason for the symptom. [phone] fax. Appointment scheduled.

## 2023-04-07 NOTE — TELEPHONE ENCOUNTER
Patients home care nurse called to say patient had a fall but she needs to be seen by her primary doctor in order for them to continue care for her         Please advise and call

## 2023-04-10 DIAGNOSIS — I10 ESSENTIAL HYPERTENSION, BENIGN: ICD-10-CM

## 2023-04-10 DIAGNOSIS — E66.01 OBESITY, CLASS III, BMI 40-49.9 (MORBID OBESITY) (HCC): ICD-10-CM

## 2023-04-10 LAB
ALBUMIN SERPL-MCNC: 3.7 G/DL (ref 3.4–5)
ALBUMIN/GLOB SERPL: 1.1 {RATIO} (ref 1.1–2.2)
ALP SERPL-CCNC: 72 U/L (ref 40–129)
ALT SERPL-CCNC: 8 U/L (ref 10–40)
ANION GAP SERPL CALCULATED.3IONS-SCNC: 15 MMOL/L (ref 3–16)
AST SERPL-CCNC: 16 U/L (ref 15–37)
BASOPHILS # BLD: 0 K/UL (ref 0–0.2)
BASOPHILS NFR BLD: 0.6 %
BILIRUB SERPL-MCNC: 0.4 MG/DL (ref 0–1)
BUN SERPL-MCNC: 15 MG/DL (ref 7–20)
CALCIUM SERPL-MCNC: 9.6 MG/DL (ref 8.3–10.6)
CHLORIDE SERPL-SCNC: 107 MMOL/L (ref 99–110)
CO2 SERPL-SCNC: 21 MMOL/L (ref 21–32)
CREAT SERPL-MCNC: 0.9 MG/DL (ref 0.6–1.2)
DEPRECATED RDW RBC AUTO: 14.9 % (ref 12.4–15.4)
EOSINOPHIL # BLD: 0 K/UL (ref 0–0.6)
EOSINOPHIL NFR BLD: 0.8 %
GFR SERPLBLD CREATININE-BSD FMLA CKD-EPI: >60 ML/MIN/{1.73_M2}
GLUCOSE SERPL-MCNC: 103 MG/DL (ref 70–99)
HCT VFR BLD AUTO: 40.6 % (ref 36–48)
HGB BLD-MCNC: 13.4 G/DL (ref 12–16)
LYMPHOCYTES # BLD: 0.8 K/UL (ref 1–5.1)
LYMPHOCYTES NFR BLD: 31.4 %
MCH RBC QN AUTO: 30.5 PG (ref 26–34)
MCHC RBC AUTO-ENTMCNC: 33 G/DL (ref 31–36)
MCV RBC AUTO: 92.4 FL (ref 80–100)
MONOCYTES # BLD: 0.2 K/UL (ref 0–1.3)
MONOCYTES NFR BLD: 10 %
NEUTROPHILS # BLD: 1.4 K/UL (ref 1.7–7.7)
NEUTROPHILS NFR BLD: 57.2 %
PLATELET # BLD AUTO: 191 K/UL (ref 135–450)
PMV BLD AUTO: 10.5 FL (ref 5–10.5)
POTASSIUM SERPL-SCNC: 4 MMOL/L (ref 3.5–5.1)
PROT SERPL-MCNC: 7.2 G/DL (ref 6.4–8.2)
RBC # BLD AUTO: 4.39 M/UL (ref 4–5.2)
SODIUM SERPL-SCNC: 143 MMOL/L (ref 136–145)
TSH SERPL DL<=0.005 MIU/L-ACNC: 1.57 UIU/ML (ref 0.27–4.2)
WBC # BLD AUTO: 2.5 K/UL (ref 4–11)

## 2023-05-10 ENCOUNTER — ANTI-COAG VISIT (OUTPATIENT)
Dept: PHARMACY | Age: 83
End: 2023-05-10
Payer: MEDICARE

## 2023-05-10 DIAGNOSIS — I26.99 OTHER PULMONARY EMBOLISM WITHOUT ACUTE COR PULMONALE, UNSPECIFIED CHRONICITY (HCC): Primary | ICD-10-CM

## 2023-05-10 LAB — INTERNATIONAL NORMALIZATION RATIO, POC: 2.4

## 2023-05-10 PROCEDURE — 85610 PROTHROMBIN TIME: CPT

## 2023-05-10 PROCEDURE — 99211 OFF/OP EST MAY X REQ PHY/QHP: CPT

## 2023-06-21 ENCOUNTER — TELEPHONE (OUTPATIENT)
Dept: PHARMACY | Age: 83
End: 2023-06-21

## 2023-06-21 DIAGNOSIS — I26.99 OTHER PULMONARY EMBOLISM WITHOUT ACUTE COR PULMONALE, UNSPECIFIED CHRONICITY (HCC): Primary | ICD-10-CM

## 2023-06-21 DIAGNOSIS — I26.99 PULMONARY EMBOLISM, OTHER, UNSPECIFIED CHRONICITY, UNSPECIFIED WHETHER ACUTE COR PULMONALE PRESENT (HCC): ICD-10-CM

## 2023-06-21 DIAGNOSIS — Z79.01 ENCOUNTER FOR CURRENT LONG-TERM USE OF ANTICOAGULANTS: ICD-10-CM

## 2023-06-21 DIAGNOSIS — I26.99 PULMONARY EMBOLISM, UNSPECIFIED CHRONICITY, UNSPECIFIED PULMONARY EMBOLISM TYPE, UNSPECIFIED WHETHER ACUTE COR PULMONALE PRESENT (HCC): ICD-10-CM

## 2023-06-21 NOTE — TELEPHONE ENCOUNTER
Dr. Kalyan Hurtado,    The patient's warfarin therapy is currently being managed by Andrey Romero. The referral on file is going to  soon. Please sign pended referral for patient to continue care with the anticoagulation clinic.      Thanks,   Reyna Pineda, PharmD, [facility]  [facility] EDILvojoyce: [phone]  [place]: [phone]  2023 12:28 PM

## 2023-07-26 ENCOUNTER — ANTI-COAG VISIT (OUTPATIENT)
Dept: PHARMACY | Age: 83
End: 2023-07-26
Payer: MEDICARE

## 2023-07-26 DIAGNOSIS — I26.99 OTHER PULMONARY EMBOLISM WITHOUT ACUTE COR PULMONALE, UNSPECIFIED CHRONICITY (HCC): Primary | ICD-10-CM

## 2023-07-26 LAB — INTERNATIONAL NORMALIZATION RATIO, POC: 4.9

## 2023-07-26 PROCEDURE — 99212 OFFICE O/P EST SF 10 MIN: CPT | Performed by: PHARMACIST

## 2023-07-26 PROCEDURE — 85610 PROTHROMBIN TIME: CPT | Performed by: PHARMACIST

## 2023-07-26 NOTE — PROGRESS NOTES
ANTICOAGULATION SERVICE    Rosales Lowry is a 78 y.o. female with PMHx significant for DVT, PE, breast CA who presents to clinic on 7/26/2023 for anticoagulation monitoring and adjustment. 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     Anticoagulation Indication(s):  DVT, PE    Referring Physician:  Dr. Winn Speaks  Goal INR Range:  2-3  Duration of Anticoagulation Therapy:  Indefinite  Time of day dose taken:  AM  Product patient has at home:  warfarin 5 mg (peach)    INR Summary                            Warfarin regimen (mg)  Date INR   A/P    Sun Mon Tue Wed Thu Fri Sat Mg/wk  7/26 4.9 Above goal, hold x2  2.5 5 2.5 0/5 0/2.5 5 5 27.5  6/14 3.0 At goal, continue  2.5 5 2.5 5 2.5 5 5 27.5  5/10 2.4 At goal, continue  2.5 5 2.5 5 2.5 5 5 27.5  3/29 2.6 At goal, continue  2.5 5 2.5 5 2.5 5 5 27.5  3/1 3.4 Above goal, no change 2.5 5 2.5 5 2.5 5 5 27.5  1/18 3.1 Above goal, no change 2.5 5 2.5 5 2.5 5 5 27.5  12/7 2.6 At goal, no change  2.5 5 2.5 5 2.5 5 5 27.5  10/26 3.2 At goal, no change  2.5 5 2.5 5 2.5 5 5 27.5  9/14 2.5 At goal, no change  2.5 5 2.5 5 2.5 5 5 27.5  8/3 2.7 At goal, no change  2.5 5 2.5 5 2.5 5 5 27.5  6/22 2.3 At goal, no change  2.5 5 2.5 5 2.5 5 5 27.5  5/5 3.3 Above goal, continue  2.5 5 2.5 5 2.5 5 5 27.5  3/24  2.7       At goal, continue  2.5 5 2.5 5 2.5 5 5 27.5  2/10  2.2       At goal, continue  2.5 5 2.5 5 2.5 5 5 27.5  12/30  2.7       At goal, continue  2.5 5 2.5 5 2.5 5 5 27.5   11/18 2.3 At goal, continue   2.5 5 2.5 5 2.5 5 5 27.5  10/28 3.9 Above goal, hold x1  2.5 5 2.5 5 2.5 0/5 5 27.5  9/16 2.7 At goal, no change  2.5 5 2.5 5 2.5 5 5 27.5  8/26 3.1 Above goal, no change 2.5 5 2.5 5 2.5 5 5 27.5  6/30 2.1 At goal, no change  2.5 5 2.5 5 2.5 5 5 27.5  5/19 2.3 At goal, no change  2.5 5 2.5 5 2.5 5 5 27.5  4/7 2.4 At goal, no change  2.5 5 2.5 5 2.5 5 5 27.5  2/24 1.9 Below goal, continue  2.5 5 2.5 5 2.5 5 5 27.5  1/13 2.8 At goal, no

## 2023-08-24 ENCOUNTER — ANTI-COAG VISIT (OUTPATIENT)
Dept: PHARMACY | Age: 83
End: 2023-08-24
Payer: MEDICARE

## 2023-08-24 DIAGNOSIS — I26.99 OTHER PULMONARY EMBOLISM WITHOUT ACUTE COR PULMONALE, UNSPECIFIED CHRONICITY (HCC): Primary | ICD-10-CM

## 2023-08-24 LAB — INTERNATIONAL NORMALIZATION RATIO, POC: 3.8

## 2023-08-24 PROCEDURE — 99212 OFFICE O/P EST SF 10 MIN: CPT | Performed by: PHARMACIST

## 2023-08-24 PROCEDURE — 85610 PROTHROMBIN TIME: CPT | Performed by: PHARMACIST

## 2023-08-24 NOTE — PROGRESS NOTES
ANTICOAGULATION SERVICE    Kathrin Shea is a 78 y.o. female with PMHx significant for DVT, PE, breast CA who presents to clinic on 8/24/2023 for anticoagulation monitoring and adjustment. 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     Anticoagulation Indication(s):  DVT, PE    Referring Physician:  Dr. Marino Lux  Goal INR Range:  2-3  Duration of Anticoagulation Therapy:  Indefinite  Time of day dose taken:  AM  Product patient has at home:  warfarin 5 mg (peach)    INR Summary                            Warfarin regimen (mg)  Date INR   A/P    Sun Mon Tue Wed Thu Fri Sat Mg/wk  8/24 3.8 Above goal, hold + dec 2.5 5 2.5 5 0/2.5 5 2.5 25  7/26 4.9 Above goal, hold x2  2.5 5 2.5 0/5 0/2.5 5 5 27.5  6/14 3.0 At goal, continue  2.5 5 2.5 5 2.5 5 5 27.5  5/10 2.4 At goal, continue  2.5 5 2.5 5 2.5 5 5 27.5  3/29 2.6 At goal, continue  2.5 5 2.5 5 2.5 5 5 27.5  3/1 3.4 Above goal, no change 2.5 5 2.5 5 2.5 5 5 27.5  1/18 3.1 Above goal, no change 2.5 5 2.5 5 2.5 5 5 27.5  12/7 2.6 At goal, no change  2.5 5 2.5 5 2.5 5 5 27.5  10/26 3.2 At goal, no change  2.5 5 2.5 5 2.5 5 5 27.5  9/14 2.5 At goal, no change  2.5 5 2.5 5 2.5 5 5 27.5  8/3 2.7 At goal, no change  2.5 5 2.5 5 2.5 5 5 27.5  6/22 2.3 At goal, no change  2.5 5 2.5 5 2.5 5 5 27.5  5/5 3.3 Above goal, continue  2.5 5 2.5 5 2.5 5 5 27.5  3/24  2.7       At goal, continue  2.5 5 2.5 5 2.5 5 5 27.5  2/10  2.2       At goal, continue  2.5 5 2.5 5 2.5 5 5 27.5  12/30  2.7       At goal, continue  2.5 5 2.5 5 2.5 5 5 27.5   11/18 2.3 At goal, continue   2.5 5 2.5 5 2.5 5 5 27.5  10/28 3.9 Above goal, hold x1  2.5 5 2.5 5 2.5 0/5 5 27.5  9/16 2.7 At goal, no change  2.5 5 2.5 5 2.5 5 5 27.5  8/26 3.1 Above goal, no change 2.5 5 2.5 5 2.5 5 5 27.5  6/30 2.1 At goal, no change  2.5 5 2.5 5 2.5 5 5 27.5  5/19 2.3 At goal, no change  2.5 5 2.5 5 2.5 5 5 27.5  4/7 2.4 At goal, no change  2.5 5 2.5 5 2.5 5 5 27.5  2/24 1.9 Below goal,

## 2023-09-15 NOTE — TELEPHONE ENCOUNTER
Called Dr. Lyon Profit office asking for consult to be signed as it is now .      Rita Arredondo, PharmD, BCPS  [facility]  [place]: [phone]  [place]: [phone]  9/15/2023 10:41 AM

## 2023-09-21 ENCOUNTER — ANTI-COAG VISIT (OUTPATIENT)
Dept: PHARMACY | Age: 83
End: 2023-09-21
Payer: MEDICARE

## 2023-09-21 DIAGNOSIS — I26.99 OTHER PULMONARY EMBOLISM WITHOUT ACUTE COR PULMONALE, UNSPECIFIED CHRONICITY (HCC): Primary | ICD-10-CM

## 2023-09-21 LAB — INTERNATIONAL NORMALIZATION RATIO, POC: 2.9

## 2023-09-21 PROCEDURE — 99211 OFF/OP EST MAY X REQ PHY/QHP: CPT

## 2023-09-21 PROCEDURE — 85610 PROTHROMBIN TIME: CPT

## 2023-09-21 NOTE — PROGRESS NOTES
ANTICOAGULATION SERVICE    Jc Garibay is a 78 y.o. female with PMHx significant for DVT, PE, breast CA who presents to clinic on 9/21/2023 for anticoagulation monitoring and adjustment. 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     Anticoagulation Indication(s):  DVT, PE    Referring Physician:  Dr. Sheela Garcia  Goal INR Range:  2-3  Duration of Anticoagulation Therapy:  Indefinite  Time of day dose taken:  AM  Product patient has at home:  warfarin 5 mg (peach)    INR Summary                            Warfarin regimen (mg)  Date INR   A/P    Sun Mon Tue Wed Thu Fri Sat Mg/wk  9/21 2.9 At goal, continue  2.5 5 2.5 5 2.5 5 2.5 25 8/24 3.8 Above goal, hold + dec 2.5 5 2.5 5 0/2.5 5 2.5 25 7/26 4.9 Above goal, hold x2  2.5 5 2.5 0/5 0/2.5 5 5 27.5  6/14 3.0 At goal, continue  2.5 5 2.5 5 2.5 5 5 27.5  5/10 2.4 At goal, continue  2.5 5 2.5 5 2.5 5 5 27.5  3/29 2.6 At goal, continue  2.5 5 2.5 5 2.5 5 5 27.5  3/1 3.4 Above goal, no change 2.5 5 2.5 5 2.5 5 5 27.5  1/18 3.1 Above goal, no change 2.5 5 2.5 5 2.5 5 5 27.5  12/7 2.6 At goal, no change  2.5 5 2.5 5 2.5 5 5 27.5  10/26 3.2 At goal, no change  2.5 5 2.5 5 2.5 5 5 27.5  9/14 2.5 At goal, no change  2.5 5 2.5 5 2.5 5 5 27.5  8/3 2.7 At goal, no change  2.5 5 2.5 5 2.5 5 5 27.5  6/22 2.3 At goal, no change  2.5 5 2.5 5 2.5 5 5 27.5  5/5 3.3 Above goal, continue  2.5 5 2.5 5 2.5 5 5 27.5  3/24  2.7       At goal, continue  2.5 5 2.5 5 2.5 5 5 27.5  2/10  2.2       At goal, continue  2.5 5 2.5 5 2.5 5 5 27.5  12/30  2.7       At goal, continue  2.5 5 2.5 5 2.5 5 5 27.5   11/18 2.3 At goal, continue   2.5 5 2.5 5 2.5 5 5 27.5  10/28 3.9 Above goal, hold x1  2.5 5 2.5 5 2.5 0/5 5 27.5  9/16 2.7 At goal, no change  2.5 5 2.5 5 2.5 5 5 27.5  8/26 3.1 Above goal, no change 2.5 5 2.5 5 2.5 5 5 27.5  6/30 2.1 At goal, no change  2.5 5 2.5 5 2.5 5 5 27.5  5/19 2.3 At goal, no change  2.5 5 2.5 5 2.5 5 5 27.5  4/7 2.4 At goal, no

## 2023-10-18 ENCOUNTER — ANTI-COAG VISIT (OUTPATIENT)
Dept: PHARMACY | Age: 83
End: 2023-10-18
Payer: MEDICARE

## 2023-10-18 DIAGNOSIS — I26.99 OTHER PULMONARY EMBOLISM WITHOUT ACUTE COR PULMONALE, UNSPECIFIED CHRONICITY (HCC): Primary | ICD-10-CM

## 2023-10-18 LAB — INTERNATIONAL NORMALIZATION RATIO, POC: 3.9

## 2023-10-18 PROCEDURE — 99212 OFFICE O/P EST SF 10 MIN: CPT | Performed by: PHARMACIST

## 2023-10-18 PROCEDURE — 85610 PROTHROMBIN TIME: CPT | Performed by: PHARMACIST

## 2023-10-18 NOTE — PROGRESS NOTES
ANTICOAGULATION SERVICE    Sylvia Valentine is a 78 y.o. female with PMHx significant for DVT, PE, breast CA who presents to clinic on 10/18/2023 for anticoagulation monitoring and adjustment. 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     Anticoagulation Indication(s):  DVT, PE    Referring Physician:  Dr. Cesar Lambert  Goal INR Range:  2-3  Duration of Anticoagulation Therapy:  Indefinite  Time of day dose taken:  AM  Product patient has at home:  warfarin 5 mg (peach)    INR Summary                            Warfarin regimen (mg)  Date INR   A/P    Sun Mon Tue Wed Thu Fri Sat Mg/wk  10/18 3.9 Above goal, hold+dec  2.5 5 2.5 0/2.5 2.5 5 2.5 22.5  9/21 2.9 At goal, continue  2.5 5 2.5 5 2.5 5 2.5 25 8/24 3.8 Above goal, hold + dec 2.5 5 2.5 5 0/2.5 5 2.5 25 7/26 4.9 Above goal, hold x2  2.5 5 2.5 0/5 0/2.5 5 5 27.5  6/14 3.0 At goal, continue  2.5 5 2.5 5 2.5 5 5 27.5  5/10 2.4 At goal, continue  2.5 5 2.5 5 2.5 5 5 27.5  3/29 2.6 At goal, continue  2.5 5 2.5 5 2.5 5 5 27.5  3/1 3.4 Above goal, no change 2.5 5 2.5 5 2.5 5 5 27.5  1/18 3.1 Above goal, no change 2.5 5 2.5 5 2.5 5 5 27.5  12/7 2.6 At goal, no change  2.5 5 2.5 5 2.5 5 5 27.5  10/26 3.2 At goal, no change  2.5 5 2.5 5 2.5 5 5 27.5  9/14 2.5 At goal, no change  2.5 5 2.5 5 2.5 5 5 27.5  8/3 2.7 At goal, no change  2.5 5 2.5 5 2.5 5 5 27.5  6/22 2.3 At goal, no change  2.5 5 2.5 5 2.5 5 5 27.5  5/5 3.3 Above goal, continue  2.5 5 2.5 5 2.5 5 5 27.5  3/24  2.7       At goal, continue  2.5 5 2.5 5 2.5 5 5 27.5  2/10  2.2       At goal, continue  2.5 5 2.5 5 2.5 5 5 27.5  12/30  2.7       At goal, continue  2.5 5 2.5 5 2.5 5 5 27.5   11/18 2.3 At goal, continue   2.5 5 2.5 5 2.5 5 5 27.5  10/28 3.9 Above goal, hold x1  2.5 5 2.5 5 2.5 0/5 5 27.5  9/16 2.7 At goal, no change  2.5 5 2.5 5 2.5 5 5 27.5  8/26 3.1 Above goal, no change 2.5 5 2.5 5 2.5 5 5 27.5  6/30 2.1 At goal, no change  2.5 5 2.5 5 2.5 5 5 27.5  5/19 2.3 At goal, no

## 2023-11-15 ENCOUNTER — ANTI-COAG VISIT (OUTPATIENT)
Dept: PHARMACY | Age: 83
End: 2023-11-15
Payer: MEDICARE

## 2023-11-15 DIAGNOSIS — I26.99 OTHER PULMONARY EMBOLISM WITHOUT ACUTE COR PULMONALE, UNSPECIFIED CHRONICITY (HCC): Primary | ICD-10-CM

## 2023-11-15 LAB — INTERNATIONAL NORMALIZATION RATIO, POC: 2.7

## 2023-11-15 PROCEDURE — 99211 OFF/OP EST MAY X REQ PHY/QHP: CPT | Performed by: PHARMACIST

## 2023-11-15 PROCEDURE — 85610 PROTHROMBIN TIME: CPT | Performed by: PHARMACIST

## 2023-11-15 NOTE — PROGRESS NOTES
ANTICOAGULATION SERVICE    Dontae Mayers is a 78 y.o. female with PMHx significant for DVT, PE, breast CA who presents to clinic on 11/15/2023 for anticoagulation monitoring and adjustment. 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     Anticoagulation Indication(s):  DVT, PE    Referring Physician:  Dr. Saige Angulo  Goal INR Range:  2-3  Duration of Anticoagulation Therapy:  Indefinite  Time of day dose taken:  AM  Product patient has at home:  warfarin 5 mg (peach)    INR Summary                            Warfarin regimen (mg)  Date INR   A/P    Sun Mon Tue Wed Thu Fri Sat Mg/wk  11/15 2.7 At goal, continue   2.5 5 2.5 2.5 2.5 5 2.5 22.5  10/18 3.9 Above goal, hold+dec  2.5 5 2.5 0/2.5 2.5 5 2.5 22.5  9/21 2.9 At goal, continue  2.5 5 2.5 5 2.5 5 2.5 25  8/24 3.8 Above goal, hold + dec 2.5 5 2.5 5 0/2.5 5 2.5 25  7/26 4.9 Above goal, hold x2  2.5 5 2.5 0/5 0/2.5 5 5 27.5  6/14 3.0 At goal, continue  2.5 5 2.5 5 2.5 5 5 27.5  5/10 2.4 At goal, continue  2.5 5 2.5 5 2.5 5 5 27.5  3/29 2.6 At goal, continue  2.5 5 2.5 5 2.5 5 5 27.5  3/1 3.4 Above goal, no change 2.5 5 2.5 5 2.5 5 5 27.5  1/18 3.1 Above goal, no change 2.5 5 2.5 5 2.5 5 5 27.5  12/7 2.6 At goal, no change  2.5 5 2.5 5 2.5 5 5 27.5  10/26 3.2 At goal, no change  2.5 5 2.5 5 2.5 5 5 27.5  9/14 2.5 At goal, no change  2.5 5 2.5 5 2.5 5 5 27.5  8/3 2.7 At goal, no change  2.5 5 2.5 5 2.5 5 5 27.5  6/22 2.3 At goal, no change  2.5 5 2.5 5 2.5 5 5 27.5  5/5 3.3 Above goal, continue  2.5 5 2.5 5 2.5 5 5 27.5  3/24  2.7       At goal, continue  2.5 5 2.5 5 2.5 5 5 27.5  2/10  2.2       At goal, continue  2.5 5 2.5 5 2.5 5 5 27.5  12/30  2.7       At goal, continue  2.5 5 2.5 5 2.5 5 5 27.5   11/18 2.3 At goal, continue   2.5 5 2.5 5 2.5 5 5 27.5  10/28 3.9 Above goal, hold x1  2.5 5 2.5 5 2.5 0/5 5 27.5  9/16 2.7 At goal, no change  2.5 5 2.5 5 2.5 5 5 27.5  8/26 3.1 Above goal, no change 2.5 5 2.5 5 2.5 5 5 27.5  6/30 2.1 At goal, no

## 2023-12-11 DIAGNOSIS — I10 ESSENTIAL HYPERTENSION, BENIGN: ICD-10-CM

## 2023-12-11 DIAGNOSIS — I26.99 OTHER PULMONARY EMBOLISM WITHOUT ACUTE COR PULMONALE, UNSPECIFIED CHRONICITY (HCC): ICD-10-CM

## 2023-12-12 RX ORDER — AMLODIPINE BESYLATE 5 MG/1
TABLET ORAL
Qty: 90 TABLET | Refills: 1 | Status: SHIPPED | OUTPATIENT
Start: 2023-12-12

## 2023-12-12 RX ORDER — WARFARIN SODIUM 5 MG/1
TABLET ORAL
Qty: 71 TABLET | Refills: 1 | Status: SHIPPED | OUTPATIENT
Start: 2023-12-12

## 2023-12-13 ENCOUNTER — ANTI-COAG VISIT (OUTPATIENT)
Dept: PHARMACY | Age: 83
End: 2023-12-13
Payer: MEDICARE

## 2023-12-13 DIAGNOSIS — I26.99 OTHER PULMONARY EMBOLISM WITHOUT ACUTE COR PULMONALE, UNSPECIFIED CHRONICITY (HCC): Primary | ICD-10-CM

## 2023-12-13 LAB — INTERNATIONAL NORMALIZATION RATIO, POC: 3.2

## 2023-12-13 PROCEDURE — 85610 PROTHROMBIN TIME: CPT

## 2023-12-13 PROCEDURE — 99211 OFF/OP EST MAY X REQ PHY/QHP: CPT

## 2023-12-13 NOTE — PROGRESS NOTES
ANTICOAGULATION SERVICE    Salvador Hi is a 78 y.o. female with PMHx significant for DVT, PE, breast CA who presents to clinic on 12/13/2023 for anticoagulation monitoring and adjustment. 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     Anticoagulation Indication(s):  DVT, PE    Referring Physician:  Dr. Yolanda Ruiz  Goal INR Range:  2-3  Duration of Anticoagulation Therapy:  Indefinite  Time of day dose taken:  AM  Product patient has at home:  warfarin 5 mg (peach)    INR Summary                            Warfarin regimen (mg)  Date INR   A/P    Sun Mon Tue Wed Thu Fri Sat Mg/wk  12/13 3.2 Above goal, hold    2.5 5 2.5 0/2.5 2.5 5 2.5 22.5  11/15 2.7 At goal, continue   2.5 5 2.5 2.5 2.5 5 2.5 22.5  10/18 3.9 Above goal, hold+dec  2.5 5 2.5 0/2.5 2.5 5 2.5 22.5  9/21 2.9 At goal, continue  2.5 5 2.5 5 2.5 5 2.5 25  8/24 3.8 Above goal, hold + dec 2.5 5 2.5 5 0/2.5 5 2.5 25  7/26 4.9 Above goal, hold x2  2.5 5 2.5 0/5 0/2.5 5 5 27.5  6/14 3.0 At goal, continue  2.5 5 2.5 5 2.5 5 5 27.5  5/10 2.4 At goal, continue  2.5 5 2.5 5 2.5 5 5 27.5  3/29 2.6 At goal, continue  2.5 5 2.5 5 2.5 5 5 27.5  3/1 3.4 Above goal, no change 2.5 5 2.5 5 2.5 5 5 27.5  1/18 3.1 Above goal, no change 2.5 5 2.5 5 2.5 5 5 27.5  12/7 2.6 At goal, no change  2.5 5 2.5 5 2.5 5 5 27.5  10/26 3.2 At goal, no change  2.5 5 2.5 5 2.5 5 5 27.5  9/14 2.5 At goal, no change  2.5 5 2.5 5 2.5 5 5 27.5  8/3 2.7 At goal, no change  2.5 5 2.5 5 2.5 5 5 27.5  6/22 2.3 At goal, no change  2.5 5 2.5 5 2.5 5 5 27.5  5/5 3.3 Above goal, continue  2.5 5 2.5 5 2.5 5 5 27.5  3/24  2.7       At goal, continue  2.5 5 2.5 5 2.5 5 5 27.5  2/10  2.2       At goal, continue  2.5 5 2.5 5 2.5 5 5 27.5  12/30  2.7       At goal, continue  2.5 5 2.5 5 2.5 5 5 27.5   11/18 2.3 At goal, continue   2.5 5 2.5 5 2.5 5 5 27.5  10/28 3.9 Above goal, hold x1  2.5 5 2.5 5 2.5 0/5 5 27.5  9/16 2.7 At goal, no change  2.5 5 2.5 5 2.5 5 5 27.5  8/26 3.1 Above

## 2024-02-01 ENCOUNTER — ANTI-COAG VISIT (OUTPATIENT)
Dept: PHARMACY | Age: 84
End: 2024-02-01
Payer: MEDICARE

## 2024-02-01 DIAGNOSIS — I26.99 OTHER PULMONARY EMBOLISM WITHOUT ACUTE COR PULMONALE, UNSPECIFIED CHRONICITY (HCC): Primary | ICD-10-CM

## 2024-02-01 LAB — INTERNATIONAL NORMALIZATION RATIO, POC: 2

## 2024-02-01 PROCEDURE — 99211 OFF/OP EST MAY X REQ PHY/QHP: CPT | Performed by: PHARMACIST

## 2024-02-01 PROCEDURE — 85610 PROTHROMBIN TIME: CPT | Performed by: PHARMACIST

## 2024-02-01 NOTE — PROGRESS NOTES
goal, no change  2.5 5 2.5 5 2.5 5 5 27.5  8/26 3.1 Above goal, no change 2.5 5 2.5 5 2.5 5 5 27.5  6/30 2.1 At goal, no change  2.5 5 2.5 5 2.5 5 5 27.5  5/19 2.3 At goal, no change  2.5 5 2.5 5 2.5 5 5 27.5  4/7 2.4 At goal, no change  2.5 5 2.5 5 2.5 5 5 27.5  2/24 1.9 Below goal, continue  2.5 5 2.5 5 2.5 5 5 27.5  1/13 2.8 At goal, no change  2.5 5 2.5 5 2.5 5 5 27.5  12/2 2.3 At goal, no change  2.5 5 2.5 5 2.5 5 5 27.5  10/21 3.0 At goal, no change  2.5 5 2.5 5 2.5 5 5 27.5  9/9 2.8 At goal, no change  2.5 5 2.5 5 2.5 5 5 27.5  8/5 2.8 At goal, no change  2.5 5 2.5 5 2.5 5 5 27.5  7/2 2.3 At goal, no change  2.5 5 2.5 5 2.5 5 5 27.5  6/5 2.02 At goal, no change  2.5 5 2.5 5 2.5 5 5 27.5  5/4 2.44 At goal, no change  2.5 5 2.5 5 2.5 5 5 27.5  4/3 2.21 At goal, no change  2.5 5 2.5 5 2.5 5 5 27.5  3/6 1.8 Below goal, continue  2.5 5 2.5 5 2.5 5 5 27.5  2/7 2.5 At goal, no change  2.5 5 2.5 5 2.5 5 5 27.5  1/10 2.2 At goal, no change  2.5 5 2.5 5 2.5 5 5 27.5  12/19 2.2 At goal, no change  2.5 5 2.5 5 2.5 5 5 27.5    Last CBC:  Lab Results   Component Value Date    RBC 4.39 04/10/2023    HGB 13.4 04/10/2023    HCT 40.6 04/10/2023    MCV 92.4 04/10/2023    MCH 30.5 04/10/2023    MPV 10.5 04/10/2023    RDW 14.9 04/10/2023     04/10/2023       Patient History:  Recent hospitalizations/HC visits None reported   Recent medication changes None reported   Medications taken regularly that may interact with warfarin or alter INR No significant drug interactions identified   Warfarin dose taken as prescribed -Yes  -Patient has pillbox, but doesn't currently use it; she has a system for taking medications that works well   Signs/symptoms of bleeding No h/o major bleeding reported   Vitamin K intake Normally has ~2 serving of green, leafy vegetables per month (occasional broccoli or yohan greens)   Recent vomiting/diarrhea/fever, changes in weight or activity level None reported   Tobacco or alcohol use Patient

## 2024-02-06 ENCOUNTER — TELEPHONE (OUTPATIENT)
Dept: INTERNAL MEDICINE CLINIC | Age: 84
End: 2024-02-06

## 2024-02-06 NOTE — TELEPHONE ENCOUNTER
Rosalba from Home Health is following up orders that were faxed 3x over the past 2 months. She is going to fax them over again.     Please call and advise     Ph. [phone] Ryann

## 2024-03-13 ENCOUNTER — ANTI-COAG VISIT (OUTPATIENT)
Dept: PHARMACY | Age: 84
End: 2024-03-13
Payer: MEDICARE

## 2024-03-13 DIAGNOSIS — I26.99 OTHER PULMONARY EMBOLISM WITHOUT ACUTE COR PULMONALE, UNSPECIFIED CHRONICITY (HCC): Primary | ICD-10-CM

## 2024-03-13 LAB — INTERNATIONAL NORMALIZATION RATIO, POC: 2

## 2024-03-13 PROCEDURE — 85610 PROTHROMBIN TIME: CPT

## 2024-03-13 PROCEDURE — 99211 OFF/OP EST MAY X REQ PHY/QHP: CPT

## 2024-03-13 NOTE — PROGRESS NOTES
vomiting/diarrhea/fever, changes in weight or activity level None reported   Tobacco or alcohol use Patient denies both   Upcoming surgeries or procedures None reported     Assessment/Plan:  Patient's INR was therapeutic (2.0) today. She denies any missed/extra warfarin doses, medication changes, illness, or bleeding since last visit.     Patient was instructed to continue warfarin 2.5 mg daily except 5 mg on MF. Repeat INR in 6 weeks per patient request. She relies on her son for transportation. Patient was reminded to maintain consistent vitamin K intake and call with any bleeding, medication changes, or fever/vomiting/diarrhea.     Patient understands dosing directions and information discussed.  Dosing schedule and follow up appointment given to patient.  Progress note routed to referring physician's office.  Patient acknowledges working in consult agreement with pharmacist as referred by his/her physician.     Next INR Check:  4/24     Please call [facility] at [phone] with any questions.      Thanks!  Shannan Mendoza Pharm.D.   [facility]  Stacey: [phone]  Josefina: [phone]  3/13/2024 11:32 AM    For Pharmacy Admin Tracking Only    Total # of Interventions Recommended: 0  Total # of Interventions Accepted: 0  Time Spent (min): 15

## 2024-04-24 ENCOUNTER — ANTI-COAG VISIT (OUTPATIENT)
Dept: PHARMACY | Age: 84
End: 2024-04-24
Payer: MEDICARE

## 2024-04-24 DIAGNOSIS — I26.99 OTHER PULMONARY EMBOLISM WITHOUT ACUTE COR PULMONALE, UNSPECIFIED CHRONICITY (HCC): Primary | ICD-10-CM

## 2024-04-24 LAB — INTERNATIONAL NORMALIZATION RATIO, POC: 2.1

## 2024-04-24 PROCEDURE — 99211 OFF/OP EST MAY X REQ PHY/QHP: CPT

## 2024-04-24 PROCEDURE — 85610 PROTHROMBIN TIME: CPT

## 2024-04-24 NOTE — PROGRESS NOTES
ANTICOAGULATION SERVICE    Mercedes Singh is a 83 y.o. female with PMHx significant for DVT, PE, breast CA who presents to clinic on 4/24/2024 for anticoagulation monitoring and adjustment.  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    Anticoagulation Indication(s):  DVT, PE    Referring Physician:  Dr. Corona  Goal INR Range:  2-3  Duration of Anticoagulation Therapy:  Indefinite  Time of day dose taken:  AM  Product patient has at home:  warfarin 5 mg (peach)    INR Summary                            Warfarin regimen (mg)  Date INR   A/P    Sun Mon Tue Wed Thu Fri Sat Mg/wk  4/24 2.1 At goal, continue    2.5 5 2.5 2.5 2.5 5 2.5 22.5  3/13 2.0 At goal, continue    2.5 5 2.5 2.5 2.5 5 2.5 22.5  2/1 2.0 At goal, continue    2.5 5 2.5 2.5 2.5 5 2.5 22.5  12/13 3.2 Above goal, hold    2.5 5 2.5 0/2.5 2.5 5 2.5 22.5  11/15 2.7 At goal, continue   2.5 5 2.5 2.5 2.5 5 2.5 22.5  10/18 3.9 Above goal, hold+dec  2.5 5 2.5 0/2.5 2.5 5 2.5 22.5  9/21 2.9 At goal, continue  2.5 5 2.5 5 2.5 5 2.5 25  8/24 3.8 Above goal, hold + dec 2.5 5 2.5 5 0/2.5 5 2.5 25  7/26 4.9 Above goal, hold x2  2.5 5 2.5 0/5 0/2.5 5 5 27.5  6/14 3.0 At goal, continue  2.5 5 2.5 5 2.5 5 5 27.5  5/10 2.4 At goal, continue  2.5 5 2.5 5 2.5 5 5 27.5  3/29 2.6 At goal, continue  2.5 5 2.5 5 2.5 5 5 27.5  3/1 3.4 Above goal, no change 2.5 5 2.5 5 2.5 5 5 27.5  1/18 3.1 Above goal, no change 2.5 5 2.5 5 2.5 5 5 27.5  12/7 2.6 At goal, no change  2.5 5 2.5 5 2.5 5 5 27.5  10/26 3.2 At goal, no change  2.5 5 2.5 5 2.5 5 5 27.5  9/14 2.5 At goal, no change  2.5 5 2.5 5 2.5 5 5 27.5  8/3 2.7 At goal, no change  2.5 5 2.5 5 2.5 5 5 27.5  6/22 2.3 At goal, no change  2.5 5 2.5 5 2.5 5 5 27.5  5/5 3.3 Above goal, continue  2.5 5 2.5 5 2.5 5 5 27.5  3/24  2.7       At goal, continue  2.5 5 2.5 5 2.5 5 5 27.5  2/10  2.2       At goal, continue  2.5 5 2.5 5 2.5 5 5 27.5  12/30  2.7       At goal,

## 2024-05-31 DIAGNOSIS — I26.99 OTHER PULMONARY EMBOLISM WITHOUT ACUTE COR PULMONALE, UNSPECIFIED CHRONICITY (HCC): ICD-10-CM

## 2024-06-03 RX ORDER — WARFARIN SODIUM 5 MG/1
TABLET ORAL
Qty: 71 TABLET | Refills: 1 | Status: SHIPPED | OUTPATIENT
Start: 2024-06-03

## 2024-06-05 ENCOUNTER — ANTI-COAG VISIT (OUTPATIENT)
Dept: PHARMACY | Age: 84
End: 2024-06-05
Payer: MEDICARE

## 2024-06-05 DIAGNOSIS — I26.99 OTHER PULMONARY EMBOLISM WITHOUT ACUTE COR PULMONALE, UNSPECIFIED CHRONICITY (HCC): Primary | ICD-10-CM

## 2024-06-05 LAB — INTERNATIONAL NORMALIZATION RATIO, POC: 2.3

## 2024-06-05 PROCEDURE — 85610 PROTHROMBIN TIME: CPT

## 2024-06-05 PROCEDURE — 99211 OFF/OP EST MAY X REQ PHY/QHP: CPT

## 2024-06-05 NOTE — PROGRESS NOTES
ANTICOAGULATION SERVICE    Mercedes Singh is a 83 y.o. female with PMHx significant for DVT, PE, breast CA who presents to clinic on 6/5/2024 for anticoagulation monitoring and adjustment.  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    Anticoagulation Indication(s):  DVT, PE    Referring Physician:  Dr. Corona  Goal INR Range:  2-3  Duration of Anticoagulation Therapy:  Indefinite  Time of day dose taken:  AM  Product patient has at home:  warfarin 5 mg (peach)    INR Summary                            Warfarin regimen (mg)  Date INR   A/P    Sun Mon Tue Wed Thu Fri Sat Mg/wk  6/5 2.3 At goal, continue    2.5 5 2.5 2.5 2.5 5 2.5 22.5  4/24 2.1 At goal, continue    2.5 5 2.5 2.5 2.5 5 2.5 22.5  3/13 2.0 At goal, continue    2.5 5 2.5 2.5 2.5 5 2.5 22.5  2/1 2.0 At goal, continue    2.5 5 2.5 2.5 2.5 5 2.5 22.5  12/13 3.2 Above goal, hold    2.5 5 2.5 0/2.5 2.5 5 2.5 22.5  11/15 2.7 At goal, continue   2.5 5 2.5 2.5 2.5 5 2.5 22.5  10/18 3.9 Above goal, hold+dec  2.5 5 2.5 0/2.5 2.5 5 2.5 22.5  9/21 2.9 At goal, continue  2.5 5 2.5 5 2.5 5 2.5 25  8/24 3.8 Above goal, hold + dec 2.5 5 2.5 5 0/2.5 5 2.5 25  7/26 4.9 Above goal, hold x2  2.5 5 2.5 0/5 0/2.5 5 5 27.5  6/14 3.0 At goal, continue  2.5 5 2.5 5 2.5 5 5 27.5  5/10 2.4 At goal, continue  2.5 5 2.5 5 2.5 5 5 27.5  3/29 2.6 At goal, continue  2.5 5 2.5 5 2.5 5 5 27.5  3/1 3.4 Above goal, no change 2.5 5 2.5 5 2.5 5 5 27.5  1/18 3.1 Above goal, no change 2.5 5 2.5 5 2.5 5 5 27.5  12/7 2.6 At goal, no change  2.5 5 2.5 5 2.5 5 5 27.5  10/26 3.2 At goal, no change  2.5 5 2.5 5 2.5 5 5 27.5  9/14 2.5 At goal, no change  2.5 5 2.5 5 2.5 5 5 27.5  8/3 2.7 At goal, no change  2.5 5 2.5 5 2.5 5 5 27.5  6/22 2.3 At goal, no change  2.5 5 2.5 5 2.5 5 5 27.5  5/5 3.3 Above goal, continue  2.5 5 2.5 5 2.5 5 5 27.5  3/24  2.7       At goal, continue  2.5 5 2.5 5 2.5 5 5 27.5  2/10  2.2       At goal, continue  2.5 5 2.5 5 2.5 5 5 27.5  12/30  2.7

## 2024-06-06 DIAGNOSIS — I10 ESSENTIAL HYPERTENSION, BENIGN: ICD-10-CM

## 2024-06-06 RX ORDER — AMLODIPINE BESYLATE 5 MG/1
TABLET ORAL
Qty: 30 TABLET | Refills: 0 | Status: SHIPPED | OUTPATIENT
Start: 2024-06-06

## 2024-06-06 NOTE — TELEPHONE ENCOUNTER
PATIENT LAST SEEN 04/10/2023    I HAVE LEFT A MESSAGE FOR THE PATIENT THAT SHE IS DUE FOR AN APPOINTMENT WITH .    NEEDS WELLNESS VISIT

## 2024-07-09 DIAGNOSIS — I10 ESSENTIAL HYPERTENSION, BENIGN: ICD-10-CM

## 2024-07-09 NOTE — TELEPHONE ENCOUNTER
Pt requesting the following refill, has AWV scheduled for 7/17/24    amLODIPine (NORVASC) 5 MG tablet     [facility] [id number] - [place], [place] - [address] - [place] [phone] - F [phone]

## 2024-07-10 RX ORDER — AMLODIPINE BESYLATE 5 MG/1
5 TABLET ORAL DAILY
Qty: 90 TABLET | Refills: 0 | Status: SHIPPED | OUTPATIENT
Start: 2024-07-10

## 2024-07-17 ENCOUNTER — ANTI-COAG VISIT (OUTPATIENT)
Dept: PHARMACY | Age: 84
End: 2024-07-17
Payer: MEDICARE

## 2024-07-17 ENCOUNTER — OFFICE VISIT (OUTPATIENT)
Dept: INTERNAL MEDICINE CLINIC | Age: 84
End: 2024-07-17
Payer: MEDICARE

## 2024-07-17 VITALS
BODY MASS INDEX: 39.07 KG/M2 | HEIGHT: 60 IN | WEIGHT: 199 LBS | DIASTOLIC BLOOD PRESSURE: 92 MMHG | SYSTOLIC BLOOD PRESSURE: 130 MMHG

## 2024-07-17 DIAGNOSIS — R93.89 ABNORMAL CT SCAN, CHEST: ICD-10-CM

## 2024-07-17 DIAGNOSIS — Z17.0 MALIGNANT NEOPLASM OF LOWER-OUTER QUADRANT OF LEFT BREAST OF FEMALE, ESTROGEN RECEPTOR POSITIVE (HCC): ICD-10-CM

## 2024-07-17 DIAGNOSIS — D70.4 CYCLICAL NEUTROPENIA (HCC): ICD-10-CM

## 2024-07-17 DIAGNOSIS — Z00.00 PE (PHYSICAL EXAM), ANNUAL: Primary | ICD-10-CM

## 2024-07-17 DIAGNOSIS — C50.512 MALIGNANT NEOPLASM OF LOWER-OUTER QUADRANT OF LEFT BREAST OF FEMALE, ESTROGEN RECEPTOR POSITIVE (HCC): ICD-10-CM

## 2024-07-17 DIAGNOSIS — I10 ESSENTIAL HYPERTENSION, BENIGN: ICD-10-CM

## 2024-07-17 DIAGNOSIS — I26.99 OTHER PULMONARY EMBOLISM WITHOUT ACUTE COR PULMONALE, UNSPECIFIED CHRONICITY (HCC): ICD-10-CM

## 2024-07-17 DIAGNOSIS — I26.99 OTHER PULMONARY EMBOLISM WITHOUT ACUTE COR PULMONALE, UNSPECIFIED CHRONICITY (HCC): Primary | ICD-10-CM

## 2024-07-17 DIAGNOSIS — Z23 NEED FOR PNEUMOCOCCAL 20-VALENT CONJUGATE VACCINATION: ICD-10-CM

## 2024-07-17 DIAGNOSIS — E55.9 VITAMIN D DEFICIENCY: ICD-10-CM

## 2024-07-17 DIAGNOSIS — K21.9 GASTROESOPHAGEAL REFLUX DISEASE WITHOUT ESOPHAGITIS: ICD-10-CM

## 2024-07-17 DIAGNOSIS — M19.90 OSTEOARTHRITIS, UNSPECIFIED OSTEOARTHRITIS TYPE, UNSPECIFIED SITE: ICD-10-CM

## 2024-07-17 LAB
INTERNATIONAL NORMALIZATION RATIO, POC: 2.6
PROTHROMBIN TIME, POC: NORMAL

## 2024-07-17 PROCEDURE — 90473 IMMUNE ADMIN ORAL/NASAL: CPT | Performed by: INTERNAL MEDICINE

## 2024-07-17 PROCEDURE — 90677 PCV20 VACCINE IM: CPT | Performed by: INTERNAL MEDICINE

## 2024-07-17 PROCEDURE — 85610 PROTHROMBIN TIME: CPT | Performed by: PHARMACIST

## 2024-07-17 PROCEDURE — 3075F SYST BP GE 130 - 139MM HG: CPT | Performed by: INTERNAL MEDICINE

## 2024-07-17 PROCEDURE — G0009 ADMIN PNEUMOCOCCAL VACCINE: HCPCS | Performed by: INTERNAL MEDICINE

## 2024-07-17 PROCEDURE — 3080F DIAST BP >= 90 MM HG: CPT | Performed by: INTERNAL MEDICINE

## 2024-07-17 PROCEDURE — 1123F ACP DISCUSS/DSCN MKR DOCD: CPT | Performed by: INTERNAL MEDICINE

## 2024-07-17 PROCEDURE — 99211 OFF/OP EST MAY X REQ PHY/QHP: CPT | Performed by: PHARMACIST

## 2024-07-17 PROCEDURE — G0439 PPPS, SUBSEQ VISIT: HCPCS | Performed by: INTERNAL MEDICINE

## 2024-07-17 SDOH — ECONOMIC STABILITY: FOOD INSECURITY: WITHIN THE PAST 12 MONTHS, YOU WORRIED THAT YOUR FOOD WOULD RUN OUT BEFORE YOU GOT MONEY TO BUY MORE.: NEVER TRUE

## 2024-07-17 SDOH — ECONOMIC STABILITY: HOUSING INSECURITY
IN THE LAST 12 MONTHS, WAS THERE A TIME WHEN YOU DID NOT HAVE A STEADY PLACE TO SLEEP OR SLEPT IN A SHELTER (INCLUDING NOW)?: NO

## 2024-07-17 SDOH — ECONOMIC STABILITY: FOOD INSECURITY: WITHIN THE PAST 12 MONTHS, THE FOOD YOU BOUGHT JUST DIDN'T LAST AND YOU DIDN'T HAVE MONEY TO GET MORE.: NEVER TRUE

## 2024-07-17 SDOH — ECONOMIC STABILITY: INCOME INSECURITY: HOW HARD IS IT FOR YOU TO PAY FOR THE VERY BASICS LIKE FOOD, HOUSING, MEDICAL CARE, AND HEATING?: NOT HARD AT ALL

## 2024-07-17 NOTE — PROGRESS NOTES
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     Subjective       Patient's complete Health Risk Assessment and screening values have been reviewed and are found in Flowsheets. The following problems were reviewed today and where indicated follow up appointments were made and/or referrals ordered.    Positive Risk Factor Screenings with Interventions:                Abnormal BMI (obese):  Body mass index is 39.52 kg/m². (!) Abnormal  Interventions:  Patient declines any further evaluation or treatment                           Objective   Vitals:    07/17/24 1055   BP: (!) 130/92   Weight: 90.3 kg (199 lb)   Height: 1.511 m (4' 11.5\")      Body mass index is 39.52 kg/m².        General Appearance: alert and oriented to person, place and time, well developed and well- nourished, in no acute distress  Skin: warm and dry, no rash or erythema  Head: normocephalic and atraumatic  Eyes: pupils equal, round, and reactive to light, extraocular eye movements intact, conjunctivae normal  ENT: tympanic membrane, external ear and ear canal normal bilaterally, nose without deformity, nasal mucosa and turbinates normal without polyps  Neck: supple and non-tender without mass, no thyromegaly or thyroid nodules, no cervical lymphadenopathy  Pulmonary/Chest: clear to auscultation bilaterally- no wheezes, rales or rhonchi, normal air movement, no respiratory distress  Cardiovascular: normal rate, regular rhythm, normal S1 and S2, no murmurs, rubs, clicks, or

## 2024-07-17 NOTE — PROGRESS NOTES
Annual Wellness Visit     Patient:  Mercedes Singh                                               : 1940  Age: 83 y.o.  MRN: [id number]  Date : 2024      CHIEF COMPLAINT: Mercedes Singh is a 83 y.o. female who presents for : Physical exam    1. Other pulmonary embolism without acute cor pulmonale, unspecified chronicity (HCC)  History of massive pulmonary embolism now on chronic Coumadin followed by the Coumadin clinic    2. Cyclical neutropenia (HCC)  No symptoms of infection  - External Referral To Physical Therapy  - CBC with Auto Differential; Future  - Comprehensive Metabolic Panel; Future  - Lipid Panel; Future  - TSH; Future  - Urinalysis; Future  - Vitamin D 25 Hydroxy; Future  - LIYAH DIGITAL SCREEN W OR WO CAD BILATERAL; Future    3. Malignant neoplasm of lower-outer quadrant of left breast of female, estrogen receptor positive (HCC)  History of breast cancer without recurrence to get mammogram  - External Referral To Physical Therapy  - CBC with Auto Differential; Future  - Comprehensive Metabolic Panel; Future  - Lipid Panel; Future  - TSH; Future  - Urinalysis; Future  - Vitamin D 25 Hydroxy; Future  - LIYAH DIGITAL SCREEN W OR WO CAD BILATERAL; Future    4. Essential hypertension, benign  This problem is stable  - External Referral To Physical Therapy  - CBC with Auto Differential; Future  - Comprehensive Metabolic Panel; Future  - Lipid Panel; Future  - TSH; Future  - Urinalysis; Future  - Vitamin D 25 Hydroxy; Future  - LIYAH DIGITAL SCREEN W OR WO CAD BILATERAL; Future    5. Gastroesophageal reflux disease without esophagitis  This problem is controlled on present meds    6. PE (physical exam), annual  Generally feels okay except for decreased ambulation denies any chest pain shortness of breath or any other  - External Referral To Physical Therapy  - CBC with Auto Differential; Future  - Comprehensive Metabolic Panel; Future  - Lipid Panel; Future  - TSH; Future  - Urinalysis; Future  -

## 2024-07-17 NOTE — PROGRESS NOTES
ANTICOAGULATION SERVICE    Mercedes Singh is a 83 y.o. female with PMHx significant for DVT, PE, breast CA who presents to clinic on 7/17/2024 for anticoagulation monitoring and adjustment.  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    Anticoagulation Indication(s):  DVT, PE    Referring Physician:  Dr. Corona  Goal INR Range:  2-3  Duration of Anticoagulation Therapy:  Indefinite  Time of day dose taken:  AM  Product patient has at home:  warfarin 5 mg (peach)    INR Summary                            Warfarin regimen (mg)  Date INR   A/P    Sun Mon Tue Wed Thu Fri Sat Mg/wk  7/17 2.6 At goal, continue    2.5 5 2.5 2.5 2.5 5 2.5 22.5  6/5 2.3 At goal, continue    2.5 5 2.5 2.5 2.5 5 2.5 22.5  4/24 2.1 At goal, continue    2.5 5 2.5 2.5 2.5 5 2.5 22.5  3/13 2.0 At goal, continue    2.5 5 2.5 2.5 2.5 5 2.5 22.5  2/1 2.0 At goal, continue    2.5 5 2.5 2.5 2.5 5 2.5 22.5  12/13 3.2 Above goal, hold    2.5 5 2.5 0/2.5 2.5 5 2.5 22.5  11/15 2.7 At goal, continue   2.5 5 2.5 2.5 2.5 5 2.5 22.5  10/18 3.9 Above goal, hold+dec  2.5 5 2.5 0/2.5 2.5 5 2.5 22.5  9/21 2.9 At goal, continue  2.5 5 2.5 5 2.5 5 2.5 25  8/24 3.8 Above goal, hold + dec 2.5 5 2.5 5 0/2.5 5 2.5 25  7/26 4.9 Above goal, hold x2  2.5 5 2.5 0/5 0/2.5 5 5 27.5  6/14 3.0 At goal, continue  2.5 5 2.5 5 2.5 5 5 27.5  5/10 2.4 At goal, continue  2.5 5 2.5 5 2.5 5 5 27.5  3/29 2.6 At goal, continue  2.5 5 2.5 5 2.5 5 5 27.5  3/1 3.4 Above goal, no change 2.5 5 2.5 5 2.5 5 5 27.5  1/18 3.1 Above goal, no change 2.5 5 2.5 5 2.5 5 5 27.5  12/7 2.6 At goal, no change  2.5 5 2.5 5 2.5 5 5 27.5  10/26 3.2 At goal, no change  2.5 5 2.5 5 2.5 5 5 27.5  9/14 2.5 At goal, no change  2.5 5 2.5 5 2.5 5 5 27.5  8/3 2.7 At goal, no change  2.5 5 2.5 5 2.5 5 5 27.5  6/22 2.3 At goal, no change  2.5 5 2.5 5 2.5 5 5 27.5  5/5 3.3 Above goal, continue  2.5 5 2.5 5 2.5 5 5 27.5  3/24  2.7       At goal, continue  2.5 5 2.5 5 2.5 5 5 27.5  2/10  2.2

## 2024-07-30 ENCOUNTER — HOSPITAL ENCOUNTER (OUTPATIENT)
Dept: CT IMAGING | Age: 84
Discharge: HOME | End: 2024-07-30
Attending: INTERNAL MEDICINE
Payer: MEDICARE

## 2024-07-30 ENCOUNTER — HOSPITAL ENCOUNTER (OUTPATIENT)
Dept: MAMMOGRAPHY | Age: 84
Discharge: HOME | End: 2024-07-30
Attending: INTERNAL MEDICINE
Payer: MEDICARE

## 2024-07-30 VITALS — WEIGHT: 199 LBS | HEIGHT: 60 IN | BODY MASS INDEX: 39.07 KG/M2

## 2024-07-30 DIAGNOSIS — Z17.0 MALIGNANT NEOPLASM OF LOWER-OUTER QUADRANT OF LEFT BREAST OF FEMALE, ESTROGEN RECEPTOR POSITIVE (HCC): ICD-10-CM

## 2024-07-30 DIAGNOSIS — C50.512 MALIGNANT NEOPLASM OF LOWER-OUTER QUADRANT OF LEFT BREAST OF FEMALE, ESTROGEN RECEPTOR POSITIVE (HCC): ICD-10-CM

## 2024-07-30 DIAGNOSIS — Z12.31 VISIT FOR SCREENING MAMMOGRAM: ICD-10-CM

## 2024-07-30 DIAGNOSIS — R93.89 ABNORMAL CT SCAN, CHEST: ICD-10-CM

## 2024-07-30 PROCEDURE — 77063 BREAST TOMOSYNTHESIS BI: CPT

## 2024-07-30 PROCEDURE — 71250 CT THORAX DX C-: CPT

## 2024-08-07 ENCOUNTER — HOSPITAL ENCOUNTER (OUTPATIENT)
Dept: MAMMOGRAPHY | Age: 84
Discharge: HOME | End: 2024-08-07
Payer: MEDICARE

## 2024-08-07 ENCOUNTER — HOSPITAL ENCOUNTER (OUTPATIENT)
Dept: ULTRASOUND IMAGING | Age: 84
Discharge: HOME | End: 2024-08-07
Payer: MEDICARE

## 2024-08-07 ENCOUNTER — TELEPHONE (OUTPATIENT)
Dept: INTERNAL MEDICINE CLINIC | Age: 84
End: 2024-08-07

## 2024-08-07 DIAGNOSIS — R92.8 FOLLOW-UP EXAMINATION OF ABNORMAL MAMMOGRAM: ICD-10-CM

## 2024-08-07 DIAGNOSIS — R92.8 ABNORMAL MAMMOGRAM: ICD-10-CM

## 2024-08-07 PROCEDURE — G0279 TOMOSYNTHESIS, MAMMO: HCPCS

## 2024-08-07 PROCEDURE — 76642 ULTRASOUND BREAST LIMITED: CPT

## 2024-08-07 NOTE — TELEPHONE ENCOUNTER
Pt has a Breast Biopsy scheduled for 8/22. [facility] would like to get Dr. Corona's ok to hold the patients Warfarin for 5 days before. Please fax ok to [phone]    Piper: phone [phone]  Fax: [phone]

## 2024-08-07 NOTE — TELEPHONE ENCOUNTER
Tried to call pt just kept ringing no answer no vm.   Spoke to Piper marcano MD advise she will call pt as well     Will try again tomorrow 8/8/24 to get her scheduled with Dr. Corona

## 2024-08-12 ENCOUNTER — OFFICE VISIT (OUTPATIENT)
Dept: INTERNAL MEDICINE CLINIC | Age: 84
End: 2024-08-12
Payer: MEDICARE

## 2024-08-12 VITALS
TEMPERATURE: 98.3 F | SYSTOLIC BLOOD PRESSURE: 130 MMHG | HEART RATE: 76 BPM | BODY MASS INDEX: 39.66 KG/M2 | DIASTOLIC BLOOD PRESSURE: 82 MMHG | WEIGHT: 202 LBS | OXYGEN SATURATION: 97 % | HEIGHT: 60 IN

## 2024-08-12 DIAGNOSIS — I26.92 SADDLE EMBOLUS OF PULMONARY ARTERY, UNSPECIFIED CHRONICITY, UNSPECIFIED WHETHER ACUTE COR PULMONALE PRESENT (HCC): ICD-10-CM

## 2024-08-12 DIAGNOSIS — N63.0 MASS OF BREAST, UNSPECIFIED LATERALITY: Primary | ICD-10-CM

## 2024-08-12 PROCEDURE — 1123F ACP DISCUSS/DSCN MKR DOCD: CPT | Performed by: INTERNAL MEDICINE

## 2024-08-12 PROCEDURE — 99213 OFFICE O/P EST LOW 20 MIN: CPT | Performed by: INTERNAL MEDICINE

## 2024-08-12 RX ORDER — ENOXAPARIN SODIUM 100 MG/ML
1 INJECTION SUBCUTANEOUS 2 TIMES DAILY
Qty: 16 ML | Refills: 0 | Status: SHIPPED | OUTPATIENT
Start: 2024-08-12

## 2024-08-12 NOTE — PROGRESS NOTES
tablet by mouth daily 7/10/24   Jett Corona MD   warfarin (COUMADIN) 5 MG tablet TAKE 1 TABLET BY MOUTH DAILY  4 DAYS PER WEEK AND ONE-HALF TABLET DAILY THREE DAYS PER WEEK 6/3/24   Jett Corona MD   ondansetron (ZOFRAN ODT) 4 MG disintegrating tablet Take 1 tablet by mouth every 8 hours as needed for Nausea 8/11/21   Piper Singh PA-C   lidocaine (LIDODERM) 5 % Place 1 patch onto the skin daily 12 hours on, 12 hours off. 8/11/21   Piper Singh PA-C   brimonidine (ALPHAGAN P) 0.1 % SOLN Place 1 drop into the right eye every 8 hours Indications: patient taking BID    Ynois Davidson MD   dorzolamide (TRUSOPT) 2 % ophthalmic solution Place 2 drops into both eyes 3 times daily Indications: patient taking BID    Yonis Davidson MD   docusate sodium (COLACE) 100 MG capsule Take 1 capsule by mouth 2 times daily  Patient taking differently: Take 1 capsule by mouth 2 times daily as needed 2/15/17   Robinson Ibrahim APRN - CNP   acetaminophen (TYLENOL) 650 MG extended release tablet Take 2 tablets by mouth every 8 hours as needed for Pain 2/15/17   Robinson Ibrahim APRN - CNP   ascorbic acid (VITAMIN C) 500 MG tablet Take 1 tablet by mouth daily    Yonis Davidson MD   vitamin D (CHOLECALCIFEROL) 400 UNITS TABS tablet Take 1 tablet by mouth daily    Yonis Davidson MD   ferrous sulfate 325 (65 FE) MG tablet Take 1 tablet by mouth daily (with breakfast)    Yonis Davidson MD       Physical Exam:  Vital Signs: /82 (Site: Right Upper Arm, Position: Sitting, Cuff Size: Large Adult)   Pulse 76   Temp 98.3 °F (36.8 °C) (Temporal)   Ht 1.511 m (4' 11.5\")   Wt 91.6 kg (202 lb)   SpO2 97%   BMI 40.12 kg/m²   General: Patient appears  non-toxic  HENT: Atraumatic, normocephalic, oral mucosa moist  Lungs:  Clear bilaterally  Heart: Regular rate and rhythm  Abdomen: Non-distended, soft, non-tender  Extremities: No edema  Neuro: Nonfocal    Medical Decision Making and Plan:  Pertinent

## 2024-08-22 ENCOUNTER — HOSPITAL ENCOUNTER (OUTPATIENT)
Dept: MAMMOGRAPHY | Age: 84
Discharge: HOME | End: 2024-08-22
Payer: MEDICARE

## 2024-08-22 ENCOUNTER — HOSPITAL ENCOUNTER (OUTPATIENT)
Dept: ULTRASOUND IMAGING | Age: 84
Discharge: HOME | End: 2024-08-22
Payer: MEDICARE

## 2024-08-22 DIAGNOSIS — R92.8 ABNORMAL FINDINGS ON DIAGNOSTIC IMAGING OF BREAST: ICD-10-CM

## 2024-08-22 DIAGNOSIS — N63.0 BREAST MASS IN FEMALE: ICD-10-CM

## 2024-08-22 PROCEDURE — 77065 DX MAMMO INCL CAD UNI: CPT

## 2024-08-22 PROCEDURE — 88341 IMHCHEM/IMCYTCHM EA ADD ANTB: CPT

## 2024-08-22 PROCEDURE — 88305 TISSUE EXAM BY PATHOLOGIST: CPT

## 2024-08-22 PROCEDURE — 19083 BX BREAST 1ST LESION US IMAG: CPT

## 2024-08-22 PROCEDURE — 88342 IMHCHEM/IMCYTCHM 1ST ANTB: CPT

## 2024-08-22 PROCEDURE — 88360 TUMOR IMMUNOHISTOCHEM/MANUAL: CPT

## 2024-08-23 NOTE — PROGRESS NOTES
Pathology for breast biopsy complete, radiologist confirms concordance.     Reviewed breast biopsy results with patient and answered all questions. The radiologist is recommending that the patient see a breast surgeon regarding biopsy results.  Surgical referral placed to Dr Jay at [facility].  Breast Navigator will remain available for any questions. Pt verbalized understanding.      Piper Serna RN-MSN

## 2024-08-26 ENCOUNTER — TELEPHONE (OUTPATIENT)
Dept: INTERNAL MEDICINE CLINIC | Age: 84
End: 2024-08-26

## 2024-08-28 ENCOUNTER — ANTI-COAG VISIT (OUTPATIENT)
Dept: PHARMACY | Age: 84
End: 2024-08-28
Payer: MEDICARE

## 2024-08-28 DIAGNOSIS — I26.99 OTHER PULMONARY EMBOLISM WITHOUT ACUTE COR PULMONALE, UNSPECIFIED CHRONICITY (HCC): Primary | ICD-10-CM

## 2024-08-28 LAB
INTERNATIONAL NORMALIZATION RATIO, POC: 1.4
PROTHROMBIN TIME, POC: 0

## 2024-08-28 PROCEDURE — 99212 OFFICE O/P EST SF 10 MIN: CPT | Performed by: PHARMACIST

## 2024-08-28 PROCEDURE — 85610 PROTHROMBIN TIME: CPT | Performed by: PHARMACIST

## 2024-08-28 NOTE — PROGRESS NOTES
ANTICOAGULATION SERVICE    Mercedes Singh is a 84 y.o. female with PMHx significant for DVT, PE, breast CA who presents to clinic on 8/28/2024 for anticoagulation monitoring and adjustment.  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    Anticoagulation Indication(s):  DVT, PE    Referring Physician:  Dr. Corona  Goal INR Range:  2-3  Duration of Anticoagulation Therapy:  Indefinite  Time of day dose taken:  AM  Product patient has at home:  warfarin 5 mg (peach)    INR Summary                            Warfarin regimen (mg)  Date INR   A/P    Sun Mon Tue Wed Thu Fri Sat Mg/wk  8/28 1.4 Below goal, held    2.5 5 2.5 5/2.5 2.5 5 2.5 22.5  7/17 2.6 At goal, continue    2.5 5 2.5 2.5 2.5 5 2.5 22.5  6/5 2.3 At goal, continue    2.5 5 2.5 2.5 2.5 5 2.5 22.5  4/24 2.1 At goal, continue    2.5 5 2.5 2.5 2.5 5 2.5 22.5  3/13 2.0 At goal, continue    2.5 5 2.5 2.5 2.5 5 2.5 22.5  2/1 2.0 At goal, continue    2.5 5 2.5 2.5 2.5 5 2.5 22.5  12/13 3.2 Above goal, hold    2.5 5 2.5 0/2.5 2.5 5 2.5 22.5  11/15 2.7 At goal, continue   2.5 5 2.5 2.5 2.5 5 2.5 22.5  10/18 3.9 Above goal, hold+dec  2.5 5 2.5 0/2.5 2.5 5 2.5 22.5  9/21 2.9 At goal, continue  2.5 5 2.5 5 2.5 5 2.5 25  8/24 3.8 Above goal, hold + dec 2.5 5 2.5 5 0/2.5 5 2.5 25  7/26 4.9 Above goal, hold x2  2.5 5 2.5 0/5 0/2.5 5 5 27.5  6/14 3.0 At goal, continue  2.5 5 2.5 5 2.5 5 5 27.5  5/10 2.4 At goal, continue  2.5 5 2.5 5 2.5 5 5 27.5  3/29 2.6 At goal, continue  2.5 5 2.5 5 2.5 5 5 27.5  3/1 3.4 Above goal, no change 2.5 5 2.5 5 2.5 5 5 27.5  1/18 3.1 Above goal, no change 2.5 5 2.5 5 2.5 5 5 27.5  12/7 2.6 At goal, no change  2.5 5 2.5 5 2.5 5 5 27.5  10/26 3.2 At goal, no change  2.5 5 2.5 5 2.5 5 5 27.5  9/14 2.5 At goal, no change  2.5 5 2.5 5 2.5 5 5 27.5  8/3 2.7 At goal, no change  2.5 5 2.5 5 2.5 5 5 27.5  6/22 2.3 At goal, no change  2.5 5 2.5 5 2.5 5 5 27.5  5/5 3.3 Above goal, continue  2.5 5 2.5 5 2.5 5 5 27.5  3/24  2.7      prescribed -Yes  -Patient has pillbox, but doesn't currently use it; she has a system for taking medications that works well   Signs/symptoms of bleeding No h/o major bleeding reported   Vitamin K intake Normally has ~2 serving of green, leafy vegetables per month (occasional broccoli or yohan greens)   Recent vomiting/diarrhea/fever, changes in weight or activity level None reported   Tobacco or alcohol use Patient denies both   Upcoming surgeries or procedures None reported     Assessment/Plan:  Patient's INR was subtherapeutic (1.4) today after 5 doses of warfarin since breast biopsy. She held warfarin for 5 days prior and has been bridging with Lovenox. Patient was recently diagnosed with breast cancer and is scheduled to see a breast surgeon on 9/6. She denies any missed/extra warfarin doses, medication changes, illness, or bleeding since last visit. Asked patient to notify clinic if she has any more upcoming procedures so bringing plan can be discussed.     Patient was instructed to bolus with 5 mg tonight then continue warfarin 2.5 mg daily except 5 mg on MF. She will finish out her last two days of Lovenox injections. Repeat INR in 2 weeks. Patient usually is seen every 6 weeks per patient request. She relies on her son for transportation.     Next INR Check:  9/11      Megan Boyle, PharmD, [facility]  [facility]  Stacey: [phone]  Josefina: [phone]  8/28/2024 12:07 PM    For Pharmacy Admin Tracking Only    Intervention Detail: Dose Adjustment: 1, reason: Therapy Optimization  Total # of Interventions Recommended: 1  Total # of Interventions Accepted: 1  Time Spent (min): 15

## 2024-09-04 ENCOUNTER — TELEPHONE (OUTPATIENT)
Dept: INTERNAL MEDICINE CLINIC | Age: 84
End: 2024-09-04

## 2024-09-05 ENCOUNTER — TELEPHONE (OUTPATIENT)
Dept: SURGERY | Age: 84
End: 2024-09-05

## 2024-09-05 NOTE — TELEPHONE ENCOUNTER
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 Postop radiation, now on Arimidex, but not tolerating it well. (Prior history of right DCIS in , postop radiation). She does not feel any masses, occasional mild left breast discomfort.   Patient stopped taking Arimidex  on her own due to side effects    Breast History:  History of Previous Breast Biopsy:yes  Self Breast Exams Completed:yes-  Family History of Breast Cancer:yes-  Personal history of right breast cancer 60's    Age of Diagnosis:   Age of Death or Current Age:   Family History of Other Cancers:yes  Brother and mother history of throat cancer   Age of Diagnosis:    Age of Death or Current Age:   Ashkenazi Druze Decent:no  Bra Size: 38 B     Gyne History:  : 6,   Para: 6  Age of Menarche: 12 years  Age of Menopause: 53 years   Age of first pregnancy: 21 years  Age of first live Birth: 21 years  Breast Feeding (total time): yes  History of Hysterectomy / DINORAH-BSO: No  History of OCP's/HRT:No   When and how long:    History of Ovarian Cancer: no   What age:    Family History of Ovarian Cancer: no   Age of death or current age:   History of Gyne Surgery: no-

## 2024-09-06 ENCOUNTER — OFFICE VISIT (OUTPATIENT)
Dept: SURGERY | Age: 84
End: 2024-09-06
Payer: MEDICARE

## 2024-09-06 ENCOUNTER — PREP FOR PROCEDURE (OUTPATIENT)
Dept: SURGERY | Age: 84
End: 2024-09-06

## 2024-09-06 VITALS
DIASTOLIC BLOOD PRESSURE: 82 MMHG | HEIGHT: 60 IN | HEART RATE: 95 BPM | BODY MASS INDEX: 38.87 KG/M2 | WEIGHT: 198 LBS | OXYGEN SATURATION: 99 % | SYSTOLIC BLOOD PRESSURE: 160 MMHG

## 2024-09-06 DIAGNOSIS — Z17.0 MALIGNANT NEOPLASM OF CENTRAL PORTION OF RIGHT BREAST IN FEMALE, ESTROGEN RECEPTOR POSITIVE (HCC): ICD-10-CM

## 2024-09-06 DIAGNOSIS — Z90.13 S/P PARTIAL MASTECTOMY, BILATERAL: ICD-10-CM

## 2024-09-06 DIAGNOSIS — Z17.0 MALIGNANT NEOPLASM OF CENTRAL PORTION OF RIGHT BREAST IN FEMALE, ESTROGEN RECEPTOR POSITIVE (HCC): Primary | ICD-10-CM

## 2024-09-06 DIAGNOSIS — C50.111 MALIGNANT NEOPLASM OF CENTRAL PORTION OF RIGHT BREAST IN FEMALE, ESTROGEN RECEPTOR POSITIVE (HCC): Primary | ICD-10-CM

## 2024-09-06 DIAGNOSIS — C50.111 MALIGNANT NEOPLASM OF CENTRAL PORTION OF RIGHT BREAST IN FEMALE, ESTROGEN RECEPTOR POSITIVE (HCC): ICD-10-CM

## 2024-09-06 DIAGNOSIS — Z85.3 HISTORY OF BILATERAL BREAST CANCER: ICD-10-CM

## 2024-09-06 PROCEDURE — G8417 CALC BMI ABV UP PARAM F/U: HCPCS | Performed by: SURGERY

## 2024-09-06 PROCEDURE — G8427 DOCREV CUR MEDS BY ELIG CLIN: HCPCS | Performed by: SURGERY

## 2024-09-06 PROCEDURE — 1036F TOBACCO NON-USER: CPT | Performed by: SURGERY

## 2024-09-06 PROCEDURE — 99205 OFFICE O/P NEW HI 60 MIN: CPT | Performed by: SURGERY

## 2024-09-06 PROCEDURE — 1090F PRES/ABSN URINE INCON ASSESS: CPT | Performed by: SURGERY

## 2024-09-06 PROCEDURE — 1123F ACP DISCUSS/DSCN MKR DOCD: CPT | Performed by: SURGERY

## 2024-09-06 NOTE — PROGRESS NOTES
LUMPECTOMY  2014    RIGHT    BREAST LUMPECTOMY  2018    left    COLONOSCOPY      EYE SURGERY      CATARACTS    OTHER SURGICAL HISTORY N/A 06/06/2017    LAPAROSCOPIC VENTRAL HERNIA REPAIR        TN MASTECTOMY PARTIAL Left 08/24/2018    MAMMOGRAM GUIDED NEEDLE LOCALIZED, LEFT BREAST LUMPECTOMY WITH LEFT BREAST SENTINEL NODE BIOPSY WITH RADIOISOTOPE AND GAMMA PROBE performed by Luz Elena Jay MD at [facility] OR    UMBILICAL HERNIA REPAIR      US BREAST BIOPSY W LOC DEVICE 1ST LESION RIGHT Right 8/22/2024    US BREAST BIOPSY W LOC DEVICE 1ST LESION RIGHT 8/22/2024 [facility] ULTRASOUND       Social History     Tobacco Use    Smoking status: Never    Smokeless tobacco: Never   Vaping Use    Vaping status: Never Used   Substance Use Topics    Alcohol use: No    Drug use: No         Physical Exam    Patient examined in wheelchair.  Bilateral breasts - Well healed lumpectomy scars bilateral breasts with retraction and underlying thickening, no masses, no nipple changes.   No cervical or axillary adenopathy.    ASSESSMENT   Diagnosis Orders   1. Malignant neoplasm of central portion of right breast in female, estrogen receptor positive (HCC)        2. History of bilateral breast cancer        3. S/P partial mastectomy, bilateral             PLAN    I reviewed her imaging with radiology. Discussed imaging and breast biopsy results with patient. Reviewed surgical options, including lumpectomy, mastectomy, and mastectomy with reconstruction. There is no need to perform a sentinal node procedure. Recommendations were made following standard NCCN guidelines. She wishes to proceed with RFID localized right breast partial mastectomy for clinical T1N0 ductal carcinoma. The indications for the planned procedure, along with the potential benefits and risks which include but are not limited to the risk of anesthesia, bleeding, infection, possible failed operation, possible need for additional surgery pending final pathologic assessment,

## 2024-09-06 NOTE — PATIENT INSTRUCTIONS
Pathology report reviewed- Stage 1  Grade 2-3 ER Positive  PA  positive  HER2- negative     Breast exam performed - Palpable masses  No     Surgery risk vs. Benefits discussed  Surgical options discussed including mastectomy vs lumpectomy  Surgical consent obtained      RFID tag will need to be placed 1- 2 days before surgery.     Refer to Oncology         Follow up in office 10-14 days post op

## 2024-09-09 ENCOUNTER — TELEPHONE (OUTPATIENT)
Dept: INTERNAL MEDICINE CLINIC | Age: 84
End: 2024-09-09

## 2024-09-09 ENCOUNTER — TELEPHONE (OUTPATIENT)
Dept: PHARMACY | Age: 84
End: 2024-09-09

## 2024-09-09 RX ORDER — ACETAMINOPHEN 325 MG/1
650 TABLET ORAL ONCE
Status: CANCELLED | OUTPATIENT
Start: 2024-09-20 | End: 2024-09-09

## 2024-09-09 NOTE — TELEPHONE ENCOUNTER
Pt is asking when she should stop the Warfarin per surgery on 9-20-24. Also has appt on 9-17-24 sandra/Chloe.    [phone]  Pls call and advise

## 2024-09-12 ENCOUNTER — OFFICE VISIT (OUTPATIENT)
Dept: INTERNAL MEDICINE CLINIC | Age: 84
End: 2024-09-12

## 2024-09-12 VITALS
WEIGHT: 197 LBS | BODY MASS INDEX: 36.03 KG/M2 | DIASTOLIC BLOOD PRESSURE: 80 MMHG | TEMPERATURE: 97.8 F | OXYGEN SATURATION: 98 % | SYSTOLIC BLOOD PRESSURE: 130 MMHG | HEART RATE: 70 BPM

## 2024-09-12 DIAGNOSIS — Z86.711 HISTORY OF PULMONARY EMBOLUS (PE): ICD-10-CM

## 2024-09-12 DIAGNOSIS — Z01.818 PREOP EXAMINATION: Primary | ICD-10-CM

## 2024-09-12 DIAGNOSIS — C50.919 MALIGNANT NEOPLASM OF FEMALE BREAST, UNSPECIFIED ESTROGEN RECEPTOR STATUS, UNSPECIFIED LATERALITY, UNSPECIFIED SITE OF BREAST (HCC): ICD-10-CM

## 2024-09-12 RX ORDER — ENOXAPARIN SODIUM 100 MG/ML
80 INJECTION SUBCUTANEOUS 2 TIMES DAILY
Qty: 16 EACH | Refills: 0 | Status: SHIPPED | OUTPATIENT
Start: 2024-09-12

## 2024-09-12 SDOH — ECONOMIC STABILITY: FOOD INSECURITY: WITHIN THE PAST 12 MONTHS, THE FOOD YOU BOUGHT JUST DIDN'T LAST AND YOU DIDN'T HAVE MONEY TO GET MORE.: NEVER TRUE

## 2024-09-12 SDOH — ECONOMIC STABILITY: INCOME INSECURITY: HOW HARD IS IT FOR YOU TO PAY FOR THE VERY BASICS LIKE FOOD, HOUSING, MEDICAL CARE, AND HEATING?: NOT VERY HARD

## 2024-09-12 SDOH — ECONOMIC STABILITY: FOOD INSECURITY: WITHIN THE PAST 12 MONTHS, YOU WORRIED THAT YOUR FOOD WOULD RUN OUT BEFORE YOU GOT MONEY TO BUY MORE.: NEVER TRUE

## 2024-09-12 ASSESSMENT — PATIENT HEALTH QUESTIONNAIRE - PHQ9
SUM OF ALL RESPONSES TO PHQ QUESTIONS 1-9: 0
2. FEELING DOWN, DEPRESSED OR HOPELESS: NOT AT ALL
SUM OF ALL RESPONSES TO PHQ QUESTIONS 1-9: 0
SUM OF ALL RESPONSES TO PHQ9 QUESTIONS 1 & 2: 0
1. LITTLE INTEREST OR PLEASURE IN DOING THINGS: NOT AT ALL
SUM OF ALL RESPONSES TO PHQ QUESTIONS 1-9: 0
SUM OF ALL RESPONSES TO PHQ QUESTIONS 1-9: 0

## 2024-09-17 ENCOUNTER — HOSPITAL ENCOUNTER (OUTPATIENT)
Dept: ULTRASOUND IMAGING | Age: 84
Discharge: HOME | End: 2024-09-17
Payer: MEDICARE

## 2024-09-17 ENCOUNTER — HOSPITAL ENCOUNTER (OUTPATIENT)
Dept: MAMMOGRAPHY | Age: 84
Discharge: HOME | End: 2024-09-17
Payer: MEDICARE

## 2024-09-17 DIAGNOSIS — Z12.31 VISIT FOR SCREENING MAMMOGRAM: ICD-10-CM

## 2024-09-17 DIAGNOSIS — C50.111 MALIGNANT NEOPLASM OF CENTRAL PORTION OF RIGHT BREAST IN FEMALE, ESTROGEN RECEPTOR POSITIVE (HCC): ICD-10-CM

## 2024-09-17 DIAGNOSIS — Z17.0 MALIGNANT NEOPLASM OF CENTRAL PORTION OF RIGHT BREAST IN FEMALE, ESTROGEN RECEPTOR POSITIVE (HCC): ICD-10-CM

## 2024-09-17 PROCEDURE — 19285 PERQ DEV BREAST 1ST US IMAG: CPT

## 2024-09-17 PROCEDURE — 77065 DX MAMMO INCL CAD UNI: CPT

## 2024-09-19 ENCOUNTER — ANESTHESIA EVENT (OUTPATIENT)
Dept: OPERATING ROOM | Age: 84
End: 2024-09-19
Payer: MEDICARE

## 2024-09-20 ENCOUNTER — HOSPITAL ENCOUNTER (OUTPATIENT)
Age: 84
Setting detail: OUTPATIENT SURGERY
Discharge: HOME | End: 2024-09-20
Attending: SURGERY | Admitting: SURGERY
Payer: MEDICARE

## 2024-09-20 ENCOUNTER — ANESTHESIA (OUTPATIENT)
Dept: OPERATING ROOM | Age: 84
End: 2024-09-20
Payer: MEDICARE

## 2024-09-20 ENCOUNTER — APPOINTMENT (OUTPATIENT)
Dept: MAMMOGRAPHY | Age: 84
End: 2024-09-20
Attending: SURGERY
Payer: MEDICARE

## 2024-09-20 VITALS
WEIGHT: 198 LBS | RESPIRATION RATE: 18 BRPM | HEIGHT: 62 IN | SYSTOLIC BLOOD PRESSURE: 150 MMHG | TEMPERATURE: 96.8 F | HEART RATE: 56 BPM | BODY MASS INDEX: 36.44 KG/M2 | OXYGEN SATURATION: 98 % | DIASTOLIC BLOOD PRESSURE: 103 MMHG

## 2024-09-20 DIAGNOSIS — Z17.0 MALIGNANT NEOPLASM OF CENTRAL PORTION OF RIGHT BREAST IN FEMALE, ESTROGEN RECEPTOR POSITIVE (HCC): ICD-10-CM

## 2024-09-20 DIAGNOSIS — C50.111 MALIGNANT NEOPLASM OF CENTRAL PORTION OF RIGHT BREAST IN FEMALE, ESTROGEN RECEPTOR POSITIVE (HCC): ICD-10-CM

## 2024-09-20 LAB
APTT BLD: 30.5 SEC (ref 22.1–36.4)
INR PPP: 1.04 (ref 0.85–1.15)
PROTHROMBIN TIME: 13.8 SEC (ref 11.9–14.9)

## 2024-09-20 PROCEDURE — 3600000004 HC SURGERY LEVEL 4 BASE: Performed by: SURGERY

## 2024-09-20 PROCEDURE — 88307 TISSUE EXAM BY PATHOLOGIST: CPT

## 2024-09-20 PROCEDURE — 76098 X-RAY EXAM SURGICAL SPECIMEN: CPT

## 2024-09-20 PROCEDURE — 7100000011 HC PHASE II RECOVERY - ADDTL 15 MIN: Performed by: SURGERY

## 2024-09-20 PROCEDURE — 6370000000 HC RX 637 (ALT 250 FOR IP): Performed by: SURGERY

## 2024-09-20 PROCEDURE — 2709999900 HC NON-CHARGEABLE SUPPLY: Performed by: SURGERY

## 2024-09-20 PROCEDURE — 19301 PARTIAL MASTECTOMY: CPT | Performed by: SURGERY

## 2024-09-20 PROCEDURE — 2720000010 HC SURG SUPPLY STERILE: Performed by: SURGERY

## 2024-09-20 PROCEDURE — 6360000002 HC RX W HCPCS: Performed by: SURGERY

## 2024-09-20 PROCEDURE — 2580000003 HC RX 258: Performed by: SURGERY

## 2024-09-20 PROCEDURE — A4217 STERILE WATER/SALINE, 500 ML: HCPCS | Performed by: SURGERY

## 2024-09-20 PROCEDURE — 7100000000 HC PACU RECOVERY - FIRST 15 MIN: Performed by: SURGERY

## 2024-09-20 PROCEDURE — 3600000014 HC SURGERY LEVEL 4 ADDTL 15MIN: Performed by: SURGERY

## 2024-09-20 PROCEDURE — 6360000002 HC RX W HCPCS: Performed by: NURSE ANESTHETIST, CERTIFIED REGISTERED

## 2024-09-20 PROCEDURE — 7100000010 HC PHASE II RECOVERY - FIRST 15 MIN: Performed by: SURGERY

## 2024-09-20 PROCEDURE — 3700000001 HC ADD 15 MINUTES (ANESTHESIA): Performed by: SURGERY

## 2024-09-20 PROCEDURE — 85610 PROTHROMBIN TIME: CPT

## 2024-09-20 PROCEDURE — 7100000001 HC PACU RECOVERY - ADDTL 15 MIN: Performed by: SURGERY

## 2024-09-20 PROCEDURE — 85730 THROMBOPLASTIN TIME PARTIAL: CPT

## 2024-09-20 PROCEDURE — 2580000003 HC RX 258: Performed by: ANESTHESIOLOGY

## 2024-09-20 PROCEDURE — 3700000000 HC ANESTHESIA ATTENDED CARE: Performed by: SURGERY

## 2024-09-20 PROCEDURE — 88305 TISSUE EXAM BY PATHOLOGIST: CPT

## 2024-09-20 RX ORDER — SODIUM CHLORIDE 0.9 % (FLUSH) 0.9 %
5-40 SYRINGE (ML) INJECTION EVERY 12 HOURS SCHEDULED
Status: DISCONTINUED | OUTPATIENT
Start: 2024-09-20 | End: 2024-09-20 | Stop reason: HOSPADM

## 2024-09-20 RX ORDER — SODIUM CHLORIDE 0.9 % (FLUSH) 0.9 %
5-40 SYRINGE (ML) INJECTION PRN
Status: DISCONTINUED | OUTPATIENT
Start: 2024-09-20 | End: 2024-09-20 | Stop reason: HOSPADM

## 2024-09-20 RX ORDER — PROPOFOL 10 MG/ML
INJECTION, EMULSION INTRAVENOUS
Status: DISCONTINUED | OUTPATIENT
Start: 2024-09-20 | End: 2024-09-20 | Stop reason: SDUPTHER

## 2024-09-20 RX ORDER — WARFARIN SODIUM 2.5 MG/1
2.5 TABLET ORAL DAILY
COMMUNITY

## 2024-09-20 RX ORDER — MAGNESIUM HYDROXIDE 1200 MG/15ML
LIQUID ORAL CONTINUOUS PRN
Status: DISCONTINUED | OUTPATIENT
Start: 2024-09-20 | End: 2024-09-20 | Stop reason: HOSPADM

## 2024-09-20 RX ORDER — NALOXONE HYDROCHLORIDE 0.4 MG/ML
INJECTION, SOLUTION INTRAMUSCULAR; INTRAVENOUS; SUBCUTANEOUS PRN
Status: DISCONTINUED | OUTPATIENT
Start: 2024-09-20 | End: 2024-09-20 | Stop reason: HOSPADM

## 2024-09-20 RX ORDER — BUPIVACAINE HYDROCHLORIDE 5 MG/ML
INJECTION, SOLUTION EPIDURAL; INTRACAUDAL PRN
Status: DISCONTINUED | OUTPATIENT
Start: 2024-09-20 | End: 2024-09-20 | Stop reason: HOSPADM

## 2024-09-20 RX ORDER — HYDROMORPHONE HYDROCHLORIDE 1 MG/ML
0.5 INJECTION, SOLUTION INTRAMUSCULAR; INTRAVENOUS; SUBCUTANEOUS EVERY 10 MIN PRN
Status: DISCONTINUED | OUTPATIENT
Start: 2024-09-20 | End: 2024-09-20 | Stop reason: HOSPADM

## 2024-09-20 RX ORDER — ACETAMINOPHEN 325 MG/1
650 TABLET ORAL ONCE
Status: COMPLETED | OUTPATIENT
Start: 2024-09-20 | End: 2024-09-20

## 2024-09-20 RX ORDER — METOCLOPRAMIDE HYDROCHLORIDE 5 MG/ML
10 INJECTION INTRAMUSCULAR; INTRAVENOUS
Status: DISCONTINUED | OUTPATIENT
Start: 2024-09-20 | End: 2024-09-20 | Stop reason: HOSPADM

## 2024-09-20 RX ORDER — ONDANSETRON 2 MG/ML
INJECTION INTRAMUSCULAR; INTRAVENOUS
Status: DISCONTINUED | OUTPATIENT
Start: 2024-09-20 | End: 2024-09-20 | Stop reason: SDUPTHER

## 2024-09-20 RX ORDER — HYDRALAZINE HYDROCHLORIDE 20 MG/ML
10 INJECTION INTRAMUSCULAR; INTRAVENOUS
Status: DISCONTINUED | OUTPATIENT
Start: 2024-09-20 | End: 2024-09-20 | Stop reason: HOSPADM

## 2024-09-20 RX ORDER — HYDROMORPHONE HYDROCHLORIDE 1 MG/ML
0.5 INJECTION, SOLUTION INTRAMUSCULAR; INTRAVENOUS; SUBCUTANEOUS EVERY 5 MIN PRN
Status: DISCONTINUED | OUTPATIENT
Start: 2024-09-20 | End: 2024-09-20 | Stop reason: HOSPADM

## 2024-09-20 RX ORDER — FENTANYL CITRATE 50 UG/ML
INJECTION, SOLUTION INTRAMUSCULAR; INTRAVENOUS
Status: DISCONTINUED | OUTPATIENT
Start: 2024-09-20 | End: 2024-09-20 | Stop reason: SDUPTHER

## 2024-09-20 RX ORDER — LABETALOL HYDROCHLORIDE 5 MG/ML
10 INJECTION, SOLUTION INTRAVENOUS
Status: DISCONTINUED | OUTPATIENT
Start: 2024-09-20 | End: 2024-09-20 | Stop reason: HOSPADM

## 2024-09-20 RX ORDER — SODIUM CHLORIDE 9 MG/ML
INJECTION, SOLUTION INTRAVENOUS PRN
Status: DISCONTINUED | OUTPATIENT
Start: 2024-09-20 | End: 2024-09-20 | Stop reason: HOSPADM

## 2024-09-20 RX ORDER — SODIUM CHLORIDE, SODIUM LACTATE, POTASSIUM CHLORIDE, CALCIUM CHLORIDE 600; 310; 30; 20 MG/100ML; MG/100ML; MG/100ML; MG/100ML
INJECTION, SOLUTION INTRAVENOUS CONTINUOUS
Status: DISCONTINUED | OUTPATIENT
Start: 2024-09-20 | End: 2024-09-20 | Stop reason: HOSPADM

## 2024-09-20 RX ORDER — DIPHENHYDRAMINE HYDROCHLORIDE 50 MG/ML
12.5 INJECTION INTRAMUSCULAR; INTRAVENOUS
Status: DISCONTINUED | OUTPATIENT
Start: 2024-09-20 | End: 2024-09-20 | Stop reason: HOSPADM

## 2024-09-20 RX ORDER — MEPERIDINE HYDROCHLORIDE 25 MG/ML
12.5 INJECTION INTRAMUSCULAR; INTRAVENOUS; SUBCUTANEOUS EVERY 5 MIN PRN
Status: DISCONTINUED | OUTPATIENT
Start: 2024-09-20 | End: 2024-09-20 | Stop reason: HOSPADM

## 2024-09-20 RX ADMIN — ONDANSETRON 4 MG: 2 INJECTION INTRAMUSCULAR; INTRAVENOUS at 10:41

## 2024-09-20 RX ADMIN — PROPOFOL 20 MG: 10 INJECTION, EMULSION INTRAVENOUS at 09:52

## 2024-09-20 RX ADMIN — PHENYLEPHRINE HYDROCHLORIDE 100 MCG: 10 INJECTION, SOLUTION INTRAVENOUS at 10:34

## 2024-09-20 RX ADMIN — WATER 2000 MG: 1 INJECTION INTRAMUSCULAR; INTRAVENOUS; SUBCUTANEOUS at 09:47

## 2024-09-20 RX ADMIN — SODIUM CHLORIDE, POTASSIUM CHLORIDE, SODIUM LACTATE AND CALCIUM CHLORIDE: 600; 310; 30; 20 INJECTION, SOLUTION INTRAVENOUS at 09:04

## 2024-09-20 RX ADMIN — FENTANYL CITRATE 25 MCG: 50 INJECTION, SOLUTION INTRAMUSCULAR; INTRAVENOUS at 10:23

## 2024-09-20 RX ADMIN — PROPOFOL 50 MCG/KG/MIN: 10 INJECTION, EMULSION INTRAVENOUS at 09:50

## 2024-09-20 RX ADMIN — FENTANYL CITRATE 25 MCG: 50 INJECTION, SOLUTION INTRAMUSCULAR; INTRAVENOUS at 09:50

## 2024-09-20 RX ADMIN — FENTANYL CITRATE 25 MCG: 50 INJECTION, SOLUTION INTRAMUSCULAR; INTRAVENOUS at 09:45

## 2024-09-20 RX ADMIN — FENTANYL CITRATE 25 MCG: 50 INJECTION, SOLUTION INTRAMUSCULAR; INTRAVENOUS at 10:31

## 2024-09-20 RX ADMIN — PROPOFOL 20 MG: 10 INJECTION, EMULSION INTRAVENOUS at 10:00

## 2024-09-20 RX ADMIN — PHENYLEPHRINE HYDROCHLORIDE 100 MCG: 10 INJECTION, SOLUTION INTRAVENOUS at 10:28

## 2024-09-20 RX ADMIN — ACETAMINOPHEN 650 MG: 325 TABLET ORAL at 08:52

## 2024-09-20 RX ADMIN — PHENYLEPHRINE HYDROCHLORIDE 100 MCG: 10 INJECTION, SOLUTION INTRAVENOUS at 10:23

## 2024-09-20 RX ADMIN — PROPOFOL 20 MG: 10 INJECTION, EMULSION INTRAVENOUS at 09:49

## 2024-09-20 RX ADMIN — PHENYLEPHRINE HYDROCHLORIDE 100 MCG: 10 INJECTION, SOLUTION INTRAVENOUS at 10:40

## 2024-09-20 RX ADMIN — PROPOFOL 20 MG: 10 INJECTION, EMULSION INTRAVENOUS at 09:56

## 2024-09-20 ASSESSMENT — PAIN SCALES - GENERAL
PAINLEVEL_OUTOF10: 0

## 2024-09-20 ASSESSMENT — PAIN - FUNCTIONAL ASSESSMENT
PAIN_FUNCTIONAL_ASSESSMENT: NONE - DENIES PAIN
PAIN_FUNCTIONAL_ASSESSMENT: 0-10

## 2024-09-20 ASSESSMENT — PAIN DESCRIPTION - DESCRIPTORS: DESCRIPTORS: DISCOMFORT

## 2024-09-23 DIAGNOSIS — C50.111 MALIGNANT NEOPLASM OF CENTRAL PORTION OF RIGHT BREAST IN FEMALE, ESTROGEN RECEPTOR POSITIVE (HCC): Primary | ICD-10-CM

## 2024-09-23 DIAGNOSIS — Z17.0 MALIGNANT NEOPLASM OF CENTRAL PORTION OF RIGHT BREAST IN FEMALE, ESTROGEN RECEPTOR POSITIVE (HCC): Primary | ICD-10-CM

## 2024-10-01 ENCOUNTER — OFFICE VISIT (OUTPATIENT)
Dept: SURGERY | Age: 84
End: 2024-10-01

## 2024-10-01 VITALS — RESPIRATION RATE: 17 BRPM | HEIGHT: 62 IN | BODY MASS INDEX: 36.2 KG/M2 | HEART RATE: 18 BPM | OXYGEN SATURATION: 99 %

## 2024-10-01 DIAGNOSIS — C50.111 MALIGNANT NEOPLASM OF CENTRAL PORTION OF RIGHT BREAST IN FEMALE, ESTROGEN RECEPTOR POSITIVE (HCC): Primary | ICD-10-CM

## 2024-10-01 DIAGNOSIS — Z90.13 S/P PARTIAL MASTECTOMY, BILATERAL: ICD-10-CM

## 2024-10-01 DIAGNOSIS — Z85.3 HISTORY OF BILATERAL BREAST CANCER: ICD-10-CM

## 2024-10-01 DIAGNOSIS — Z17.0 MALIGNANT NEOPLASM OF CENTRAL PORTION OF RIGHT BREAST IN FEMALE, ESTROGEN RECEPTOR POSITIVE (HCC): Primary | ICD-10-CM

## 2024-10-01 PROCEDURE — 99024 POSTOP FOLLOW-UP VISIT: CPT | Performed by: SURGERY

## 2024-10-01 NOTE — PROGRESS NOTES
tobacco: Never   Vaping Use    Vaping status: Never Used   Substance Use Topics    Alcohol use: Never    Drug use: Never         Physical Exam      ASSESSMENT   Diagnosis Orders   1. Malignant neoplasm of central portion of right breast in female, estrogen receptor positive (HCC)        2. History of bilateral breast cancer        3. S/P partial mastectomy, bilateral             PLAN    See above    An  electronic signature was used to authenticate this note.    --BENJI PUGH MD on 10/1/2024 at 1:21 PM

## 2024-10-02 ENCOUNTER — ANTI-COAG VISIT (OUTPATIENT)
Dept: PHARMACY | Age: 84
End: 2024-10-02
Payer: MEDICARE

## 2024-10-02 DIAGNOSIS — I26.99 OTHER PULMONARY EMBOLISM WITHOUT ACUTE COR PULMONALE, UNSPECIFIED CHRONICITY (HCC): Primary | ICD-10-CM

## 2024-10-02 LAB
INTERNATIONAL NORMALIZATION RATIO, POC: 1.6
PROTHROMBIN TIME, POC: 0

## 2024-10-02 PROCEDURE — 99212 OFFICE O/P EST SF 10 MIN: CPT | Performed by: PHARMACIST

## 2024-10-02 PROCEDURE — 85610 PROTHROMBIN TIME: CPT | Performed by: PHARMACIST

## 2024-10-02 NOTE — PROGRESS NOTES
ANTICOAGULATION SERVICE    Mercedes Singh is a 84 y.o. female with PMHx significant for DVT, PE, breast CA who presents to clinic on 10/2/2024 for anticoagulation monitoring and adjustment.  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    Anticoagulation Indication(s):  DVT, PE    Referring Physician:  Dr. Corona  Goal INR Range:  2-3  Duration of Anticoagulation Therapy:  Indefinite  Time of day dose taken:  AM  Product patient has at home:  warfarin 5 mg (peach)    INR Summary                            Warfarin regimen (mg)  Date INR   A/P    Sun Mon Tue Wed Thu Fri Sat Mg/wk  10/2 1.6 Below goal, held    2.5 5 2.5 5/2.5 2.5 5 2.5 22.5  8/28 1.4 Below goal, held    2.5 5 2.5 5/2.5 2.5 5 2.5 22.5  7/17 2.6 At goal, continue    2.5 5 2.5 2.5 2.5 5 2.5 22.5  6/5 2.3 At goal, continue    2.5 5 2.5 2.5 2.5 5 2.5 22.5  4/24 2.1 At goal, continue    2.5 5 2.5 2.5 2.5 5 2.5 22.5  3/13 2.0 At goal, continue    2.5 5 2.5 2.5 2.5 5 2.5 22.5  2/1 2.0 At goal, continue    2.5 5 2.5 2.5 2.5 5 2.5 22.5  12/13 3.2 Above goal, hold    2.5 5 2.5 0/2.5 2.5 5 2.5 22.5  11/15 2.7 At goal, continue   2.5 5 2.5 2.5 2.5 5 2.5 22.5  10/18 3.9 Above goal, hold+dec  2.5 5 2.5 0/2.5 2.5 5 2.5 22.5  9/21 2.9 At goal, continue  2.5 5 2.5 5 2.5 5 2.5 25  8/24 3.8 Above goal, hold + dec 2.5 5 2.5 5 0/2.5 5 2.5 25  7/26 4.9 Above goal, hold x2  2.5 5 2.5 0/5 0/2.5 5 5 27.5  6/14 3.0 At goal, continue  2.5 5 2.5 5 2.5 5 5 27.5  5/10 2.4 At goal, continue  2.5 5 2.5 5 2.5 5 5 27.5  3/29 2.6 At goal, continue  2.5 5 2.5 5 2.5 5 5 27.5  3/1 3.4 Above goal, no change 2.5 5 2.5 5 2.5 5 5 27.5  1/18 3.1 Above goal, no change 2.5 5 2.5 5 2.5 5 5 27.5  12/7 2.6 At goal, no change  2.5 5 2.5 5 2.5 5 5 27.5  10/26 3.2 At goal, no change  2.5 5 2.5 5 2.5 5 5 27.5  9/14 2.5 At goal, no change  2.5 5 2.5 5 2.5 5 5 27.5  8/3 2.7 At goal, no change  2.5 5 2.5 5 2.5 5 5 27.5  6/22 2.3 At goal, no

## 2024-10-05 DIAGNOSIS — I10 ESSENTIAL HYPERTENSION, BENIGN: ICD-10-CM

## 2024-10-07 RX ORDER — AMLODIPINE BESYLATE 5 MG/1
5 TABLET ORAL DAILY
Qty: 90 TABLET | Refills: 3 | Status: SHIPPED | OUTPATIENT
Start: 2024-10-07

## 2024-10-16 ENCOUNTER — ANTI-COAG VISIT (OUTPATIENT)
Dept: PHARMACY | Age: 84
End: 2024-10-16
Payer: MEDICARE

## 2024-10-16 ENCOUNTER — HOSPITAL ENCOUNTER (OUTPATIENT)
Dept: GENERAL RADIOLOGY | Age: 84
Discharge: HOME | End: 2024-10-16
Attending: INTERNAL MEDICINE
Payer: MEDICARE

## 2024-10-16 DIAGNOSIS — Z79.811 USE OF AROMATASE INHIBITORS: ICD-10-CM

## 2024-10-16 DIAGNOSIS — I26.99 OTHER PULMONARY EMBOLISM WITHOUT ACUTE COR PULMONALE, UNSPECIFIED CHRONICITY (HCC): Primary | ICD-10-CM

## 2024-10-16 LAB
INTERNATIONAL NORMALIZATION RATIO, POC: 2.6
PROTHROMBIN TIME, POC: 0

## 2024-10-16 PROCEDURE — 85610 PROTHROMBIN TIME: CPT | Performed by: PHARMACIST

## 2024-10-16 PROCEDURE — 99211 OFF/OP EST MAY X REQ PHY/QHP: CPT | Performed by: PHARMACIST

## 2024-10-16 PROCEDURE — 77080 DXA BONE DENSITY AXIAL: CPT

## 2024-10-16 NOTE — PROGRESS NOTES
ANTICOAGULATION SERVICE    Mercedes Singh is a 84 y.o. female with PMHx significant for DVT, PE, breast CA who presents to clinic on 10/16/2024 for anticoagulation monitoring and adjustment.  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    Anticoagulation Indication(s):  DVT, PE    Referring Physician:  Dr. Corona  Goal INR Range:  2-3  Duration of Anticoagulation Therapy:  Indefinite  Time of day dose taken:  AM  Product patient has at home:  warfarin 5 mg (peach)    INR Summary                            Warfarin regimen (mg)  Date INR   A/P    Sun Mon Tue Wed Thu Fri Sat Mg/wk  10/16 2.6 At goal, continue     2.5 5 2.5 2.5 2.5 5 2.5 22.5  10/2 1.6 Below goal, held    2.5 5 2.5 5/2.5 2.5 5 2.5 22.5  8/28 1.4 Below goal, held    2.5 5 2.5 5/2.5 2.5 5 2.5 22.5  7/17 2.6 At goal, continue    2.5 5 2.5 2.5 2.5 5 2.5 22.5  6/5 2.3 At goal, continue    2.5 5 2.5 2.5 2.5 5 2.5 22.5  4/24 2.1 At goal, continue    2.5 5 2.5 2.5 2.5 5 2.5 22.5  3/13 2.0 At goal, continue    2.5 5 2.5 2.5 2.5 5 2.5 22.5  2/1 2.0 At goal, continue    2.5 5 2.5 2.5 2.5 5 2.5 22.5  12/13 3.2 Above goal, hold    2.5 5 2.5 0/2.5 2.5 5 2.5 22.5  11/15 2.7 At goal, continue   2.5 5 2.5 2.5 2.5 5 2.5 22.5  10/18 3.9 Above goal, hold+dec  2.5 5 2.5 0/2.5 2.5 5 2.5 22.5  9/21 2.9 At goal, continue  2.5 5 2.5 5 2.5 5 2.5 25  8/24 3.8 Above goal, hold + dec 2.5 5 2.5 5 0/2.5 5 2.5 25  7/26 4.9 Above goal, hold x2  2.5 5 2.5 0/5 0/2.5 5 5 27.5  6/14 3.0 At goal, continue  2.5 5 2.5 5 2.5 5 5 27.5  5/10 2.4 At goal, continue  2.5 5 2.5 5 2.5 5 5 27.5  3/29 2.6 At goal, continue  2.5 5 2.5 5 2.5 5 5 27.5  3/1 3.4 Above goal, no change 2.5 5 2.5 5 2.5 5 5 27.5  1/18 3.1 Above goal, no change 2.5 5 2.5 5 2.5 5 5 27.5  12/7 2.6 At goal, no change  2.5 5 2.5 5 2.5 5 5 27.5  10/26 3.2 At goal, no change  2.5 5 2.5 5 2.5 5 5 27.5  9/14 2.5 At goal, no change  2.5 5 2.5 5 2.5 5 5 27.5  8/3 2.7 At goal, no

## 2024-11-26 ENCOUNTER — TELEPHONE (OUTPATIENT)
Dept: INTERNAL MEDICINE CLINIC | Age: 84
End: 2024-11-26

## 2024-11-26 NOTE — TELEPHONE ENCOUNTER
Dafne from [employer] [facility] called asking for 2 orders to be signed by Dr Corona and fax back    Fax:[phone] (Dafne)  [employer] [facility]

## 2024-11-27 ENCOUNTER — ANTI-COAG VISIT (OUTPATIENT)
Dept: PHARMACY | Age: 84
End: 2024-11-27
Payer: MEDICARE

## 2024-11-27 LAB
INTERNATIONAL NORMALIZATION RATIO, POC: 3.8
PROTHROMBIN TIME, POC: 0

## 2024-11-27 PROCEDURE — 85610 PROTHROMBIN TIME: CPT

## 2024-11-27 PROCEDURE — 99211 OFF/OP EST MAY X REQ PHY/QHP: CPT

## 2024-11-27 NOTE — PROGRESS NOTES
invasive breast cancer.    Recent medication changes None reported   Medications taken regularly that may interact with warfarin or alter INR No significant drug interactions identified   Warfarin dose taken as prescribed -Yes  -Patient has pillbox, but doesn't currently use it; she has a system for taking medications that works well   Signs/symptoms of bleeding No h/o major bleeding reported   Vitamin K intake Normally has ~2 serving of green, leafy vegetables per month (occasional broccoli or yohan greens)   Recent vomiting/diarrhea/fever, changes in weight or activity level None reported   Tobacco or alcohol use Patient denies both   Upcoming surgeries or procedures None reported     Assessment/Plan:  Patient's INR was supratherapeutic today.  She states she did not eat her usual greens, but is going to eat a lot over the holidays.  Patient met with an oncologist last week regarding newly diagnosed stage 1 breast cancer. She was prescribed letrazole, but patient is hesitant to take it given side effects. Medication should not affect INR level regardless.     Patient was instructed to hold this pm then continue warfarin 2.5 mg daily except 5 mg on MF. She will have some greens tonight and add her regular greens back in her diet.   Repeat INR in 4 weeks, pt usually likes 6 weeks. She relies on her son for transportation.     Nohemi Garcia Pharm. D.  For Pharmacy Admin Tracking Only    Intervention Detail:   Total # of Interventions Recommended: 0  Total # of Interventions Accepted: 0  Time Spent (min): 15

## 2024-12-27 ENCOUNTER — ANTI-COAG VISIT (OUTPATIENT)
Dept: PHARMACY | Age: 84
End: 2024-12-27
Payer: MEDICARE

## 2024-12-27 DIAGNOSIS — I26.99 OTHER PULMONARY EMBOLISM WITHOUT ACUTE COR PULMONALE, UNSPECIFIED CHRONICITY (HCC): Primary | ICD-10-CM

## 2024-12-27 LAB
INR BLD: 2.8
PROTIME: NORMAL

## 2024-12-27 PROCEDURE — 85610 PROTHROMBIN TIME: CPT | Performed by: PHARMACIST

## 2024-12-27 PROCEDURE — 99211 OFF/OP EST MAY X REQ PHY/QHP: CPT | Performed by: PHARMACIST

## 2025-01-22 ENCOUNTER — ANTI-COAG VISIT (OUTPATIENT)
Dept: PHARMACY | Age: 85
End: 2025-01-22
Payer: MEDICARE

## 2025-01-22 ENCOUNTER — OFFICE VISIT (OUTPATIENT)
Dept: INTERNAL MEDICINE CLINIC | Age: 85
End: 2025-01-22

## 2025-01-22 VITALS
WEIGHT: 195 LBS | SYSTOLIC BLOOD PRESSURE: 130 MMHG | DIASTOLIC BLOOD PRESSURE: 82 MMHG | BODY MASS INDEX: 35.88 KG/M2 | HEIGHT: 62 IN

## 2025-01-22 DIAGNOSIS — Z17.0 MALIGNANT NEOPLASM OF CENTRAL PORTION OF RIGHT BREAST IN FEMALE, ESTROGEN RECEPTOR POSITIVE (HCC): ICD-10-CM

## 2025-01-22 DIAGNOSIS — K21.9 GASTROESOPHAGEAL REFLUX DISEASE WITHOUT ESOPHAGITIS: ICD-10-CM

## 2025-01-22 DIAGNOSIS — Z23 NEED FOR INFLUENZA VACCINATION: ICD-10-CM

## 2025-01-22 DIAGNOSIS — C50.111 MALIGNANT NEOPLASM OF CENTRAL PORTION OF RIGHT BREAST IN FEMALE, ESTROGEN RECEPTOR POSITIVE (HCC): ICD-10-CM

## 2025-01-22 DIAGNOSIS — I10 ESSENTIAL HYPERTENSION, BENIGN: ICD-10-CM

## 2025-01-22 DIAGNOSIS — I26.99 OTHER PULMONARY EMBOLISM WITHOUT ACUTE COR PULMONALE, UNSPECIFIED CHRONICITY (HCC): Primary | ICD-10-CM

## 2025-01-22 DIAGNOSIS — I26.99 OTHER PULMONARY EMBOLISM WITHOUT ACUTE COR PULMONALE, UNSPECIFIED CHRONICITY (HCC): ICD-10-CM

## 2025-01-22 DIAGNOSIS — I10 ESSENTIAL HYPERTENSION, BENIGN: Primary | ICD-10-CM

## 2025-01-22 LAB
ALBUMIN SERPL-MCNC: 3.9 G/DL (ref 3.4–5)
ALBUMIN/GLOB SERPL: 1.1 {RATIO} (ref 1.1–2.2)
ALP SERPL-CCNC: 81 U/L (ref 40–129)
ALT SERPL-CCNC: 8 U/L (ref 10–40)
ANION GAP SERPL CALCULATED.3IONS-SCNC: 9 MMOL/L (ref 3–16)
AST SERPL-CCNC: 19 U/L (ref 15–37)
BASOPHILS # BLD: 0 K/UL (ref 0–0.2)
BASOPHILS NFR BLD: 1.4 %
BILIRUB SERPL-MCNC: 0.4 MG/DL (ref 0–1)
BUN SERPL-MCNC: 14 MG/DL (ref 7–20)
CALCIUM SERPL-MCNC: 10.2 MG/DL (ref 8.3–10.6)
CHLORIDE SERPL-SCNC: 109 MMOL/L (ref 99–110)
CO2 SERPL-SCNC: 26 MMOL/L (ref 21–32)
CREAT SERPL-MCNC: 1 MG/DL (ref 0.6–1.2)
DEPRECATED RDW RBC AUTO: 15.3 % (ref 12.4–15.4)
EOSINOPHIL # BLD: 0 K/UL (ref 0–0.6)
EOSINOPHIL NFR BLD: 1.4 %
GFR SERPLBLD CREATININE-BSD FMLA CKD-EPI: 55 ML/MIN/{1.73_M2}
GLUCOSE SERPL-MCNC: 99 MG/DL (ref 70–99)
HCT VFR BLD AUTO: 41.4 % (ref 36–48)
HGB BLD-MCNC: 13.9 G/DL (ref 12–16)
INTERNATIONAL NORMALIZATION RATIO, POC: 1.5
LYMPHOCYTES # BLD: 0.8 K/UL (ref 1–5.1)
LYMPHOCYTES NFR BLD: 28.2 %
MCH RBC QN AUTO: 31 PG (ref 26–34)
MCHC RBC AUTO-ENTMCNC: 33.5 G/DL (ref 31–36)
MCV RBC AUTO: 92.4 FL (ref 80–100)
MONOCYTES # BLD: 0.3 K/UL (ref 0–1.3)
MONOCYTES NFR BLD: 9.6 %
NEUTROPHILS # BLD: 1.8 K/UL (ref 1.7–7.7)
NEUTROPHILS NFR BLD: 59.4 %
PLATELET # BLD AUTO: 200 K/UL (ref 135–450)
PMV BLD AUTO: 10.4 FL (ref 5–10.5)
POTASSIUM SERPL-SCNC: 4.3 MMOL/L (ref 3.5–5.1)
PROT SERPL-MCNC: 7.6 G/DL (ref 6.4–8.2)
PROTHROMBIN TIME, POC: 0
RBC # BLD AUTO: 4.48 M/UL (ref 4–5.2)
SODIUM SERPL-SCNC: 144 MMOL/L (ref 136–145)
TSH SERPL DL<=0.005 MIU/L-ACNC: 1.58 UIU/ML (ref 0.27–4.2)
WBC # BLD AUTO: 3 K/UL (ref 4–11)

## 2025-01-22 PROCEDURE — 85610 PROTHROMBIN TIME: CPT | Performed by: PHARMACIST

## 2025-01-22 PROCEDURE — 99212 OFFICE O/P EST SF 10 MIN: CPT | Performed by: PHARMACIST

## 2025-01-22 SDOH — ECONOMIC STABILITY: FOOD INSECURITY: WITHIN THE PAST 12 MONTHS, THE FOOD YOU BOUGHT JUST DIDN'T LAST AND YOU DIDN'T HAVE MONEY TO GET MORE.: NEVER TRUE

## 2025-01-22 SDOH — ECONOMIC STABILITY: FOOD INSECURITY: WITHIN THE PAST 12 MONTHS, YOU WORRIED THAT YOUR FOOD WOULD RUN OUT BEFORE YOU GOT MONEY TO BUY MORE.: NEVER TRUE

## 2025-01-22 ASSESSMENT — PATIENT HEALTH QUESTIONNAIRE - PHQ9
1. LITTLE INTEREST OR PLEASURE IN DOING THINGS: NOT AT ALL
SUM OF ALL RESPONSES TO PHQ QUESTIONS 1-9: 0
SUM OF ALL RESPONSES TO PHQ QUESTIONS 1-9: 0
2. FEELING DOWN, DEPRESSED OR HOPELESS: NOT AT ALL
SUM OF ALL RESPONSES TO PHQ QUESTIONS 1-9: 0
SUM OF ALL RESPONSES TO PHQ QUESTIONS 1-9: 0
SUM OF ALL RESPONSES TO PHQ9 QUESTIONS 1 & 2: 0

## 2025-01-22 NOTE — PROGRESS NOTES
8/22/24: Breast biopsy, held warfarin x5 days with Lovenox bridge (formulated by Dr. Corona). Found to have invasive breast cancer.    Recent medication changes None reported   Medications taken regularly that may interact with warfarin or alter INR No significant drug interactions identified   Warfarin dose taken as prescribed -Yes  -Patient has pillbox, but doesn't currently use it; she has a system for taking medications that works well   Signs/symptoms of bleeding No h/o major bleeding reported   Vitamin K intake Normally has ~2 serving of green, leafy vegetables per month (occasional broccoli or yohan greens)   Recent vomiting/diarrhea/fever, changes in weight or activity level None reported   Tobacco or alcohol use Patient denies both   Upcoming surgeries or procedures None reported     Assessment/Plan:  Patient's INR was subtherapeutic today. She denies any missed doses but reports significant increase in vitamin K food the past two weeks.     Patient was instructed to bolus with 5 mg tonight then continue weekly warfarin dose of 2.5 mg daily except 5 mg on MF. Repeat INR in 4 weeks per patient request. She typically comes over 6 weeks. She relies on her son for transportation.   Megan Boyle, PharmD, [facility]  [facility]  Stacey: [phone]  Josefina: [phone]  1/22/2025 12:17 PM    For Pharmacy Admin Tracking Only    Intervention Detail: Dose Adjustment: 1, reason: Therapy Optimization  Total # of Interventions Recommended: 1  Total # of Interventions Accepted: 1  Time Spent (min): 15

## 2025-01-22 NOTE — PROGRESS NOTES
(TYLENOL) 650 MG extended release tablet Take 2 tablets by mouth every 8 hours as needed for Pain 2/15/17  Yes Robinson Ibrahim, APRN - CNP   ascorbic acid (VITAMIN C) 500 MG tablet Take 1 tablet by mouth daily   Yes ProviderYonis MD   vitamin D (CHOLECALCIFEROL) 400 UNITS TABS tablet Take 1 tablet by mouth daily   Yes ProviderYonis MD   ferrous sulfate 325 (65 FE) MG tablet Take 1 tablet by mouth daily (with breakfast)   Yes Provider, MD Yonis       Physical Exam:  Vital Signs: /82   Ht 1.575 m (5' 2\")   Wt 88.5 kg (195 lb)   BMI 35.67 kg/m²   General: Patient appears  non-toxic  HENT: Atraumatic, normocephalic, oral mucosa moist  Lungs:  Clear bilaterally  Heart: Regular rate and rhythm  Abdomen: Non-distended, soft, non-tender  Extremities: No edema  Neuro: Nonfocal    Medical Decision Making and Plan:  Pertinent Labs & Imaging studies reviewed. (See chart for details)  Blood studies are pending    1. Essential hypertension, benign  Problem is stable continue present med  - CBC with Auto Differential; Future  - Comprehensive Metabolic Panel; Future  - TSH; Future    2. Malignant neoplasm of central portion of right breast in female, estrogen receptor positive (HCC)  Problem is stable without recurrent    3. Gastroesophageal reflux disease without esophagitis  Problem is stable with over-the-counter medicine  - CBC with Auto Differential; Future  - Comprehensive Metabolic Panel; Future  - TSH; Future    4. Other pulmonary embolism without acute cor pulmonale, unspecified chronicity (HCC)  Is stable on chronic Coumadin followed by Coumadin clinic    5. Need for influenza vaccination  Flu shot given  - Influenza, FLUAD Trivalent, (age 65 y+), IM, Preservative Free, 0.5mL  Follow-up for complete physical in 6 months if labs okay

## 2025-01-27 ENCOUNTER — TELEPHONE (OUTPATIENT)
Dept: INTERNAL MEDICINE CLINIC | Age: 85
End: 2025-01-27

## 2025-01-27 NOTE — TELEPHONE ENCOUNTER
Dr. Corona needs to sign and fax over orders  for patient    Fax [phone] [facility]

## 2025-02-24 DIAGNOSIS — C50.919 MALIGNANT NEOPLASM OF FEMALE BREAST, UNSPECIFIED ESTROGEN RECEPTOR STATUS, UNSPECIFIED LATERALITY, UNSPECIFIED SITE OF BREAST (HCC): ICD-10-CM

## 2025-02-24 DIAGNOSIS — E55.9 VITAMIN D DEFICIENCY: ICD-10-CM

## 2025-02-24 DIAGNOSIS — D70.4 CYCLICAL NEUTROPENIA (HCC): ICD-10-CM

## 2025-02-24 DIAGNOSIS — I10 ESSENTIAL HYPERTENSION, BENIGN: ICD-10-CM

## 2025-02-24 DIAGNOSIS — Z17.0 MALIGNANT NEOPLASM OF LOWER-OUTER QUADRANT OF LEFT BREAST OF FEMALE, ESTROGEN RECEPTOR POSITIVE (HCC): ICD-10-CM

## 2025-02-24 DIAGNOSIS — C50.512 MALIGNANT NEOPLASM OF LOWER-OUTER QUADRANT OF LEFT BREAST OF FEMALE, ESTROGEN RECEPTOR POSITIVE (HCC): ICD-10-CM

## 2025-02-24 DIAGNOSIS — Z86.711 HISTORY OF PULMONARY EMBOLUS (PE): ICD-10-CM

## 2025-02-24 DIAGNOSIS — Z00.00 PE (PHYSICAL EXAM), ANNUAL: ICD-10-CM

## 2025-02-24 DIAGNOSIS — Z01.818 PREOP EXAMINATION: ICD-10-CM

## 2025-02-26 ENCOUNTER — ANTI-COAG VISIT (OUTPATIENT)
Dept: PHARMACY | Age: 85
End: 2025-02-26
Payer: MEDICARE

## 2025-02-26 DIAGNOSIS — I26.99 OTHER PULMONARY EMBOLISM WITHOUT ACUTE COR PULMONALE, UNSPECIFIED CHRONICITY (HCC): Primary | ICD-10-CM

## 2025-02-26 LAB
INR BLD: 2.4
PROTIME: NORMAL

## 2025-02-26 PROCEDURE — 85610 PROTHROMBIN TIME: CPT | Performed by: PHARMACIST

## 2025-02-26 PROCEDURE — 99211 OFF/OP EST MAY X REQ PHY/QHP: CPT | Performed by: PHARMACIST

## 2025-02-26 NOTE — PROGRESS NOTES
ANTICOAGULATION SERVICE    Mercedes Singh is a 84 y.o. female with PMHx significant for DVT, PE, breast CA who presents to clinic on 2/26/2025 for anticoagulation monitoring and adjustment.  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    Anticoagulation Indication(s):  DVT, PE    Referring Physician:  Dr. Corona  Goal INR Range:  2-3  Duration of Anticoagulation Therapy:  Indefinite  Time of day dose taken:  AM  Product patient has at home:  warfarin 5 mg (peach)    INR Summary                            Warfarin regimen (mg)  Date INR   A/P    Sun Mon Tue Wed Thu Fri Sat Mg/wk  2/26 2.4 At goal, continue      2.5 5 2.5 2.5 2.5 5 2.5 22.5  1/22 1.5 Below goal, bolus      2.5 5 2.5 5/2.5 2.5 5 2.5 22.5  12/27 2.8 At goal, continue     2.5 5 2.5 2.5 2.5 5 2.5 22.5  11/27 3.8 Above goal, hold    2.5 5 2.5 0/2.5 2.5 5 2.5 22.5  10/16 2.6 At goal, continue     2.5 5 2.5 2.5 2.5 5 2.5 22.5  10/2 1.6 Below goal, held    2.5 5 2.5 5/2.5 2.5 5 2.5 22.5  8/28 1.4 Below goal, held    2.5 5 2.5 5/2.5 2.5 5 2.5 22.5  7/17 2.6 At goal, continue    2.5 5 2.5 2.5 2.5 5 2.5 22.5  6/5 2.3 At goal, continue    2.5 5 2.5 2.5 2.5 5 2.5 22.5  4/24 2.1 At goal, continue    2.5 5 2.5 2.5 2.5 5 2.5 22.5  3/13 2.0 At goal, continue    2.5 5 2.5 2.5 2.5 5 2.5 22.5  2/1 2.0 At goal, continue    2.5 5 2.5 2.5 2.5 5 2.5 22.5  12/13 3.2 Above goal, hold    2.5 5 2.5 0/2.5 2.5 5 2.5 22.5  11/15 2.7 At goal, continue   2.5 5 2.5 2.5 2.5 5 2.5 22.5  10/18 3.9 Above goal, hold+dec  2.5 5 2.5 0/2.5 2.5 5 2.5 22.5  9/21 2.9 At goal, continue  2.5 5 2.5 5 2.5 5 2.5 25  8/24 3.8 Above goal, hold + dec 2.5 5 2.5 5 0/2.5 5 2.5 25  7/26 4.9 Above goal, hold x2  2.5 5 2.5 0/5 0/2.5 5 5 27.5  6/14 3.0 At goal, continue  2.5 5 2.5 5 2.5 5 5 27.5  5/10 2.4 At goal, continue  2.5 5 2.5 5 2.5 5 5 27.5  3/29 2.6 At goal, continue  2.5 5 2.5 5 2.5 5 5 27.5  3/1 3.4 Above goal, no change 2.5 5 2.5 5 2.5 5 5 27.5  1/18 3.1 Above goal, no

## 2025-03-10 DIAGNOSIS — I26.99 OTHER PULMONARY EMBOLISM WITHOUT ACUTE COR PULMONALE, UNSPECIFIED CHRONICITY (HCC): ICD-10-CM

## 2025-03-10 RX ORDER — WARFARIN SODIUM 5 MG/1
TABLET ORAL
Qty: 71 TABLET | Refills: 1 | Status: SHIPPED | OUTPATIENT
Start: 2025-03-10 | End: 2025-03-11

## 2025-03-11 RX ORDER — WARFARIN SODIUM 5 MG/1
TABLET ORAL
Qty: 71 TABLET | Refills: 1 | Status: SHIPPED | OUTPATIENT
Start: 2025-03-11

## 2025-04-09 ENCOUNTER — ANTI-COAG VISIT (OUTPATIENT)
Dept: PHARMACY | Age: 85
End: 2025-04-09
Payer: MEDICARE

## 2025-04-09 DIAGNOSIS — I26.99 OTHER PULMONARY EMBOLISM WITHOUT ACUTE COR PULMONALE, UNSPECIFIED CHRONICITY (HCC): Primary | ICD-10-CM

## 2025-04-09 LAB
INTERNATIONAL NORMALIZATION RATIO, POC: 2.8
PROTHROMBIN TIME, POC: 0

## 2025-04-09 PROCEDURE — 85610 PROTHROMBIN TIME: CPT | Performed by: PHARMACIST

## 2025-04-09 PROCEDURE — 99211 OFF/OP EST MAY X REQ PHY/QHP: CPT | Performed by: PHARMACIST

## 2025-04-09 NOTE — PROGRESS NOTES
01/22/2025    RDW 15.3 01/22/2025     01/22/2025       Patient History:  Recent hospitalizations/HC visits 8/22/24: Breast biopsy, held warfarin x5 days with Lovenox bridge (formulated by Dr. Corona). Found to have invasive breast cancer.    Recent medication changes None reported   Medications taken regularly that may interact with warfarin or alter INR No significant drug interactions identified   Warfarin dose taken as prescribed -Yes  -Patient has pillbox, but doesn't currently use it; she has a system for taking medications that works well   Signs/symptoms of bleeding No h/o major bleeding reported   Vitamin K intake Normally has ~2 serving of green, leafy vegetables per month (occasional broccoli or yohan greens)   Recent vomiting/diarrhea/fever, changes in weight or activity level None reported   Tobacco or alcohol use Patient denies both   Upcoming surgeries or procedures None reported     Assessment/Plan:  Patient's INR was therapeutic today. She denies any changes since last visit.     Patient was instructed to continue weekly warfarin dose of 2.5 mg daily except 5 mg on MF. Repeat INR in 6 weeks per patient request. She relies on her son for transportation.     Megan Boyle, PharmD, [facility]  [facility]  [place]acey: [phone]  Josefina: [phone]  4/9/2025 12:24 PM    For Pharmacy Admin Tracking Only  Time Spent (min): 15

## 2025-05-21 ENCOUNTER — ANTI-COAG VISIT (OUTPATIENT)
Dept: PHARMACY | Age: 85
End: 2025-05-21
Payer: MEDICARE

## 2025-05-21 DIAGNOSIS — I26.99 OTHER PULMONARY EMBOLISM WITHOUT ACUTE COR PULMONALE, UNSPECIFIED CHRONICITY (HCC): Primary | ICD-10-CM

## 2025-05-21 LAB
INTERNATIONAL NORMALIZATION RATIO, POC: 2.5
PROTHROMBIN TIME, POC: 0

## 2025-05-21 PROCEDURE — 99211 OFF/OP EST MAY X REQ PHY/QHP: CPT

## 2025-05-21 PROCEDURE — 85610 PROTHROMBIN TIME: CPT

## 2025-05-21 NOTE — PROGRESS NOTES
ANTICOAGULATION SERVICE    Mercedes Singh is a 84 y.o. female with PMHx significant for DVT, PE, breast CA who presents to clinic on 5/21/2025 for anticoagulation monitoring and adjustment.  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    Anticoagulation Indication(s):  DVT, PE    Referring Physician:  Dr. Corona  Goal INR Range:  2-3  Duration of Anticoagulation Therapy:  Indefinite  Time of day dose taken:  AM  Product patient has at home:  warfarin 5 mg (peach)    INR Summary                            Warfarin regimen (mg)  Date INR   A/P    Sun Mon Tue Wed Thu Fri Sat Mg/wk  5/21 2.5 At goal, continue      2.5 5 2.5 2.5 2.5 5 2.5 22.5  4/9 2.8 At goal, continue      2.5 5 2.5 2.5 2.5 5 2.5 22.5  2/26 2.4 At goal, continue      2.5 5 2.5 2.5 2.5 5 2.5 22.5  1/22 1.5 Below goal, bolus      2.5 5 2.5 5/2.5 2.5 5 2.5 22.5  12/27 2.8 At goal, continue     2.5 5 2.5 2.5 2.5 5 2.5 22.5  11/27 3.8 Above goal, hold    2.5 5 2.5 0/2.5 2.5 5 2.5 22.5  10/16 2.6 At goal, continue     2.5 5 2.5 2.5 2.5 5 2.5 22.5  10/2 1.6 Below goal, held    2.5 5 2.5 5/2.5 2.5 5 2.5 22.5  8/28 1.4 Below goal, held    2.5 5 2.5 5/2.5 2.5 5 2.5 22.5  7/17 2.6 At goal, continue    2.5 5 2.5 2.5 2.5 5 2.5 22.5  6/5 2.3 At goal, continue    2.5 5 2.5 2.5 2.5 5 2.5 22.5  4/24 2.1 At goal, continue    2.5 5 2.5 2.5 2.5 5 2.5 22.5  3/13 2.0 At goal, continue    2.5 5 2.5 2.5 2.5 5 2.5 22.5  2/1 2.0 At goal, continue    2.5 5 2.5 2.5 2.5 5 2.5 22.5  12/13 3.2 Above goal, hold    2.5 5 2.5 0/2.5 2.5 5 2.5 22.5  11/15 2.7 At goal, continue   2.5 5 2.5 2.5 2.5 5 2.5 22.5  10/18 3.9 Above goal, hold+dec  2.5 5 2.5 0/2.5 2.5 5 2.5 22.5  9/21 2.9 At goal, continue  2.5 5 2.5 5 2.5 5 2.5 25  8/24 3.8 Above goal, hold + dec 2.5 5 2.5 5 0/2.5 5 2.5 25  7/26 4.9 Above goal, hold x2  2.5 5 2.5 0/5 0/2.5 5 5 27.5  6/14 3.0 At goal, continue  2.5 5 2.5 5 2.5 5 5 27.5  5/10 2.4 At goal, continue  2.5 5 2.5 5 2.5 5 5 27.5  3/29 2.6 At

## 2025-07-02 ENCOUNTER — ANTI-COAG VISIT (OUTPATIENT)
Dept: PHARMACY | Age: 85
End: 2025-07-02
Payer: MEDICARE

## 2025-07-02 DIAGNOSIS — I26.99 OTHER PULMONARY EMBOLISM WITHOUT ACUTE COR PULMONALE, UNSPECIFIED CHRONICITY (HCC): Primary | ICD-10-CM

## 2025-07-02 LAB
INTERNATIONAL NORMALIZATION RATIO, POC: 2.3
PROTHROMBIN TIME, POC: 0

## 2025-07-02 PROCEDURE — 99211 OFF/OP EST MAY X REQ PHY/QHP: CPT | Performed by: INTERNAL MEDICINE

## 2025-07-02 PROCEDURE — 85610 PROTHROMBIN TIME: CPT | Performed by: INTERNAL MEDICINE

## 2025-07-02 NOTE — PROGRESS NOTES
ANTICOAGULATION SERVICE    Mercedes Singh is a 84 y.o. female with PMHx significant for DVT, PE, breast CA who presents to clinic on 7/2/2025 for anticoagulation monitoring and adjustment.  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    Anticoagulation Indication(s):  DVT, PE    Referring Physician:  Dr. Corona  Goal INR Range:  2-3  Duration of Anticoagulation Therapy:  Indefinite  Time of day dose taken:  AM  Product patient has at home:  warfarin 5 mg (peach)    INR Summary                            Warfarin regimen (mg)  Date INR   A/P    Sun Mon Tue Wed Thu Fri Sat Mg/wk  7/2 2.3 At goal, continue      2.5 5 2.5 2.5 2.5 5 2.5 22.5  5/21 2.5 At goal, continue      2.5 5 2.5 2.5 2.5 5 2.5 22.5  4/9 2.8 At goal, continue      2.5 5 2.5 2.5 2.5 5 2.5 22.5  2/26 2.4 At goal, continue      2.5 5 2.5 2.5 2.5 5 2.5 22.5  1/22 1.5 Below goal, bolus      2.5 5 2.5 5/2.5 2.5 5 2.5 22.5  12/27 2.8 At goal, continue     2.5 5 2.5 2.5 2.5 5 2.5 22.5  11/27 3.8 Above goal, hold    2.5 5 2.5 0/2.5 2.5 5 2.5 22.5  10/16 2.6 At goal, continue     2.5 5 2.5 2.5 2.5 5 2.5 22.5  10/2 1.6 Below goal, held    2.5 5 2.5 5/2.5 2.5 5 2.5 22.5  8/28 1.4 Below goal, held    2.5 5 2.5 5/2.5 2.5 5 2.5 22.5  7/17 2.6 At goal, continue    2.5 5 2.5 2.5 2.5 5 2.5 22.5  6/5 2.3 At goal, continue    2.5 5 2.5 2.5 2.5 5 2.5 22.5  4/24 2.1 At goal, continue    2.5 5 2.5 2.5 2.5 5 2.5 22.5  3/13 2.0 At goal, continue    2.5 5 2.5 2.5 2.5 5 2.5 22.5  2/1 2.0 At goal, continue    2.5 5 2.5 2.5 2.5 5 2.5 22.5  12/13 3.2 Above goal, hold    2.5 5 2.5 0/2.5 2.5 5 2.5 22.5  11/15 2.7 At goal, continue   2.5 5 2.5 2.5 2.5 5 2.5 22.5  10/18 3.9 Above goal, hold+dec  2.5 5 2.5 0/2.5 2.5 5 2.5 22.5  9/21 2.9 At goal, continue  2.5 5 2.5 5 2.5 5 2.5 25  8/24 3.8 Above goal, hold + dec 2.5 5 2.5 5 0/2.5 5 2.5 25  7/26 4.9 Above goal, hold x2  2.5 5 2.5 0/5 0/2.5 5 5 27.5  6/14 3.0 At goal,

## 2025-07-24 ENCOUNTER — OFFICE VISIT (OUTPATIENT)
Dept: INTERNAL MEDICINE CLINIC | Age: 85
End: 2025-07-24
Payer: MEDICARE

## 2025-07-24 VITALS
SYSTOLIC BLOOD PRESSURE: 128 MMHG | WEIGHT: 190 LBS | DIASTOLIC BLOOD PRESSURE: 70 MMHG | HEIGHT: 62 IN | BODY MASS INDEX: 34.96 KG/M2

## 2025-07-24 DIAGNOSIS — Z17.0 MALIGNANT NEOPLASM OF CENTRAL PORTION OF RIGHT BREAST IN FEMALE, ESTROGEN RECEPTOR POSITIVE (HCC): ICD-10-CM

## 2025-07-24 DIAGNOSIS — Z00.00 PE (PHYSICAL EXAM), ANNUAL: ICD-10-CM

## 2025-07-24 DIAGNOSIS — Z00.00 PE (PHYSICAL EXAM), ANNUAL: Primary | ICD-10-CM

## 2025-07-24 DIAGNOSIS — G89.29 CHRONIC PAIN OF BOTH KNEES: ICD-10-CM

## 2025-07-24 DIAGNOSIS — E55.9 VITAMIN D DEFICIENCY: ICD-10-CM

## 2025-07-24 DIAGNOSIS — C50.111 MALIGNANT NEOPLASM OF CENTRAL PORTION OF RIGHT BREAST IN FEMALE, ESTROGEN RECEPTOR POSITIVE (HCC): ICD-10-CM

## 2025-07-24 DIAGNOSIS — I26.99 OTHER PULMONARY EMBOLISM WITHOUT ACUTE COR PULMONALE, UNSPECIFIED CHRONICITY (HCC): ICD-10-CM

## 2025-07-24 DIAGNOSIS — K21.9 GASTROESOPHAGEAL REFLUX DISEASE WITHOUT ESOPHAGITIS: ICD-10-CM

## 2025-07-24 DIAGNOSIS — M25.561 CHRONIC PAIN OF BOTH KNEES: ICD-10-CM

## 2025-07-24 DIAGNOSIS — M25.562 CHRONIC PAIN OF BOTH KNEES: ICD-10-CM

## 2025-07-24 DIAGNOSIS — I10 ESSENTIAL HYPERTENSION, BENIGN: ICD-10-CM

## 2025-07-24 LAB
ALBUMIN SERPL-MCNC: 3.7 G/DL (ref 3.4–5)
ALBUMIN/GLOB SERPL: 0.9 {RATIO} (ref 1.1–2.2)
ALP SERPL-CCNC: 81 U/L (ref 40–129)
ALT SERPL-CCNC: 8 U/L (ref 10–40)
ANION GAP SERPL CALCULATED.3IONS-SCNC: 10 MMOL/L (ref 3–16)
AST SERPL-CCNC: 17 U/L (ref 15–37)
BASOPHILS # BLD: 0 K/UL (ref 0–0.2)
BASOPHILS NFR BLD: 0.6 %
BILIRUB SERPL-MCNC: 0.4 MG/DL (ref 0–1)
BUN SERPL-MCNC: 12 MG/DL (ref 7–20)
CALCIUM SERPL-MCNC: 9.8 MG/DL (ref 8.3–10.6)
CHLORIDE SERPL-SCNC: 108 MMOL/L (ref 99–110)
CHOLEST SERPL-MCNC: 143 MG/DL (ref 0–199)
CO2 SERPL-SCNC: 23 MMOL/L (ref 21–32)
CREAT SERPL-MCNC: 0.9 MG/DL (ref 0.6–1.2)
DEPRECATED RDW RBC AUTO: 14.9 % (ref 12.4–15.4)
EOSINOPHIL # BLD: 0.1 K/UL (ref 0–0.6)
EOSINOPHIL NFR BLD: 2.8 %
GFR SERPLBLD CREATININE-BSD FMLA CKD-EPI: 62 ML/MIN/{1.73_M2}
GLUCOSE SERPL-MCNC: 103 MG/DL (ref 70–99)
HCT VFR BLD AUTO: 39.8 % (ref 36–48)
HDLC SERPL-MCNC: 52 MG/DL (ref 40–60)
HGB BLD-MCNC: 13.3 G/DL (ref 12–16)
LDLC SERPL CALC-MCNC: 81 MG/DL
LYMPHOCYTES # BLD: 0.8 K/UL (ref 1–5.1)
LYMPHOCYTES NFR BLD: 28 %
MCH RBC QN AUTO: 30.6 PG (ref 26–34)
MCHC RBC AUTO-ENTMCNC: 33.5 G/DL (ref 31–36)
MCV RBC AUTO: 91.2 FL (ref 80–100)
MONOCYTES # BLD: 0.3 K/UL (ref 0–1.3)
MONOCYTES NFR BLD: 11 %
NEUTROPHILS # BLD: 1.6 K/UL (ref 1.7–7.7)
NEUTROPHILS NFR BLD: 57.6 %
PLATELET # BLD AUTO: 201 K/UL (ref 135–450)
PMV BLD AUTO: 10 FL (ref 5–10.5)
POTASSIUM SERPL-SCNC: 4.5 MMOL/L (ref 3.5–5.1)
PROT SERPL-MCNC: 7.6 G/DL (ref 6.4–8.2)
RBC # BLD AUTO: 4.36 M/UL (ref 4–5.2)
SODIUM SERPL-SCNC: 141 MMOL/L (ref 136–145)
TRIGL SERPL-MCNC: 50 MG/DL (ref 0–150)
TSH SERPL DL<=0.005 MIU/L-ACNC: 1.6 UIU/ML (ref 0.27–4.2)
VLDLC SERPL CALC-MCNC: 10 MG/DL
WBC # BLD AUTO: 2.7 K/UL (ref 4–11)

## 2025-07-24 PROCEDURE — G0439 PPPS, SUBSEQ VISIT: HCPCS | Performed by: INTERNAL MEDICINE

## 2025-07-24 PROCEDURE — 1123F ACP DISCUSS/DSCN MKR DOCD: CPT | Performed by: INTERNAL MEDICINE

## 2025-07-24 PROCEDURE — 1159F MED LIST DOCD IN RCRD: CPT | Performed by: INTERNAL MEDICINE

## 2025-07-24 ASSESSMENT — PATIENT HEALTH QUESTIONNAIRE - PHQ9
SUM OF ALL RESPONSES TO PHQ QUESTIONS 1-9: 0
1. LITTLE INTEREST OR PLEASURE IN DOING THINGS: NOT AT ALL
SUM OF ALL RESPONSES TO PHQ QUESTIONS 1-9: 0
SUM OF ALL RESPONSES TO PHQ QUESTIONS 1-9: 0
2. FEELING DOWN, DEPRESSED OR HOPELESS: NOT AT ALL
SUM OF ALL RESPONSES TO PHQ QUESTIONS 1-9: 0

## 2025-07-24 ASSESSMENT — LIFESTYLE VARIABLES
HOW MANY STANDARD DRINKS CONTAINING ALCOHOL DO YOU HAVE ON A TYPICAL DAY: PATIENT DOES NOT DRINK
HOW OFTEN DO YOU HAVE A DRINK CONTAINING ALCOHOL: NEVER

## 2025-07-24 NOTE — PROGRESS NOTES
Annual Wellness Visit     Patient:  Mercedes Singh                                               : 1940  Age: 85 y.o.  MRN: [id number]  Date : 2025      CHIEF COMPLAINT: Mercedes Singh is a 85 y.o. female who presents for : Physical exam    1. Other pulmonary embolism without acute cor pulmonale, unspecified chronicity (HCC)  Problem is stable on chronic Coumadin followed by Coumadin clinic  - Non [facility] - External Referral To Physical Therapy  [facility]  - CBC with Auto Differential; Future  - Comprehensive Metabolic Panel; Future  - Urinalysis; Future  - TSH; Future  - Vitamin D 25 Hydroxy; Future  - Lipid Panel; Future    2. Essential hypertension, benign  Blood pressure has been well-controlled    3. Gastroesophageal reflux disease without esophagitis  History of GI reflux stable at present    4. Vitamin D deficiency     Vitamin D 25 Hydroxy; Future    5. Malignant neoplasm of central portion of right breast in female, estrogen receptor positive (HCC)  Not had follow-up for her breast cancer    6. PE (physical exam), annual  Generally feels okay except for her knee pain requests continue with physical therapy denies any chest pain or increasing shortness of breath  - Non BS - External Referral To Physical Therapy  [facility]  - CBC with Auto Differential; Future  - Comprehensive Metabolic Panel; Future  - Urinalysis; Future  - TSH; Future  - Vitamin D 25 Hydroxy; Future  - Lipid Panel; Future    7. Chronic pain of both knees  As above  - Non BS - External Referral To Physical Therapy        Patient Active Problem List    Diagnosis Date Noted    Malignant neoplasm of central portion of right breast in female, estrogen receptor positive (HCC) 2024    Morbidly obese (HCC) 2020    S/P partial mastectomy, bilateral 2019    History of bilateral breast cancer 2019    Chronic

## 2025-07-24 NOTE — PROGRESS NOTES
Medicare Annual Wellness Visit    Mercedes Singh is here for No chief complaint on file.    No follow-ups on file.     Subjective       Patient's complete Health Risk Assessment and screening values have been reviewed and are found in Flowsheets. The following problems were reviewed today and where indicated follow up appointments were made and/or referrals ordered.    Positive Risk Factor Screenings with Interventions:                Abnormal BMI (obese):  Body mass index is 34.75 kg/m². (!) Abnormal    Interventions:  Patient declines any further evaluation or treatment                            Objective   Vitals:    07/24/25 1040   BP: 128/70   Weight: 86.2 kg (190 lb)   Height: 1.575 m (5' 2\")      Body mass index is 34.75 kg/m².        General Appearance: alert and oriented to person, place and time, well developed and well- nourished, in no acute distress  Skin: warm and dry, no rash or erythema  Head: normocephalic and atraumatic  Eyes: pupils equal, round, and reactive to light, extraocular eye movements intact, conjunctivae normal  ENT: tympanic membrane, external ear and ear canal normal bilaterally, nose without deformity, nasal mucosa and turbinates normal without polyps  Neck: supple and non-tender without mass, no thyromegaly or thyroid nodules, no cervical lymphadenopathy  Pulmonary/Chest: clear to auscultation bilaterally- no wheezes, rales or rhonchi, normal air movement, no respiratory distress  Cardiovascular: normal rate, regular rhythm, normal S1 and S2, no murmurs, rubs, clicks, or gallops, distal pulses intact, no carotid bruits  Abdomen: soft, non-tender, non-distended, normal bowel sounds, no masses or organomegaly  Extremities: no cyanosis, clubbing or edema  Musculoskeletal: normal range of motion, no joint swelling, deformity or tenderness  Neurologic: reflexes normal and symmetric, no cranial nerve deficit, gait, coordination and speech normal            Allergies   Allergen Reactions

## 2025-07-25 LAB — 25(OH)D3 SERPL-MCNC: 42.4 NG/ML

## 2025-07-28 ENCOUNTER — TELEPHONE (OUTPATIENT)
Dept: INTERNAL MEDICINE CLINIC | Age: 85
End: 2025-07-28

## 2025-07-28 NOTE — TELEPHONE ENCOUNTER
Cortney from [facility] spoke with the pt who told her DR. Corona would fax over orders for her to start physical therapy but they have not received them yet. Cortney is requesting the order for physical therapy, the most recent office notes and the updated medication list for the pt.     Please Fax to [phone]    Cortney call back [phone]

## 2025-08-07 DIAGNOSIS — I26.99 OTHER PULMONARY EMBOLISM WITHOUT ACUTE COR PULMONALE, UNSPECIFIED CHRONICITY (HCC): ICD-10-CM

## 2025-08-07 RX ORDER — WARFARIN SODIUM 5 MG/1
TABLET ORAL
Qty: 71 TABLET | Refills: 1 | Status: SHIPPED | OUTPATIENT
Start: 2025-08-07

## 2025-08-13 ENCOUNTER — ANTI-COAG VISIT (OUTPATIENT)
Dept: PHARMACY | Age: 85
End: 2025-08-13
Payer: MEDICARE

## 2025-08-13 DIAGNOSIS — I26.99 OTHER PULMONARY EMBOLISM WITHOUT ACUTE COR PULMONALE, UNSPECIFIED CHRONICITY (HCC): Primary | ICD-10-CM

## 2025-08-13 LAB
INTERNATIONAL NORMALIZATION RATIO, POC: 3
PROTHROMBIN TIME, POC: NORMAL

## 2025-08-13 PROCEDURE — 99211 OFF/OP EST MAY X REQ PHY/QHP: CPT | Performed by: PHYSICIAN ASSISTANT

## 2025-08-13 PROCEDURE — 85610 PROTHROMBIN TIME: CPT | Performed by: PHYSICIAN ASSISTANT

## (undated) DEVICE — SPONGE,LAP,18"X18",DLX,XR,ST,5/PK,40/PK: Brand: MEDLINE

## (undated) DEVICE — PACK,BASIC: Brand: MEDLINE

## (undated) DEVICE — STANDARD HYPODERMIC NEEDLE,POLYPROPYLENE HUB: Brand: MONOJECT

## (undated) DEVICE — GLOVE ORANGE PI 7   MSG9070

## (undated) DEVICE — APPLIER CLP L9.38IN M LIG TI DISP STR RNG HNDL LIGACLP

## (undated) DEVICE — PENCIL SMK EVAC ALL IN 1 DSGN ENH VISIBILITY IMPROVED AIR

## (undated) DEVICE — APPLICATOR PREP 26ML 0.7% IOD POVACRYLEX 74% ISO ALC ST

## (undated) DEVICE — TURNOVER KIT RM INF CTRL TECH

## (undated) DEVICE — GOWN,SIRUS,POLYRNF,SETINSLV,XL,20/CS: Brand: MEDLINE

## (undated) DEVICE — APPLICATOR MEDICATED 26 CC SOLUTION HI LT ORNG CHLORAPREP

## (undated) DEVICE — SUTURE MCRYL SZ 4-0 L27IN ABSRB UD L19MM PS-2 1/2 CIR PRIM Y426H

## (undated) DEVICE — GOWN,SIRUS,POLYRNF,SETINSLV,L,20/CS: Brand: MEDLINE

## (undated) DEVICE — MEDI-VAC NON-CONDUCTIVE SUCTION TUBING: Brand: CARDINAL HEALTH

## (undated) DEVICE — SYRINGE IRRIG 60ML SFT PLIABLE BLB EZ TO GRP 1 HND USE W/

## (undated) DEVICE — GAUZE,SPONGE,4"X4",16PLY,XRAY,STRL,LF: Brand: MEDLINE

## (undated) DEVICE — GARMENT,MEDLINE,DVT,INT,CALF,MED, GEN2: Brand: MEDLINE

## (undated) DEVICE — TRAP FLUID

## (undated) DEVICE — DRAPE,T,LAPARO,TRANS,STERILE: Brand: MEDLINE

## (undated) DEVICE — TOWEL,STOP FLAG GOLD N-W: Brand: MEDLINE

## (undated) DEVICE — SUTURE VICRYL + SZ 2-0 L27IN ABSRB WHT SH 1/2 CIR TAPERCUT VCP417H

## (undated) DEVICE — ELECTRODE PT RET AD L9FT HI MOIST COND ADH HYDRGEL CORDED

## (undated) DEVICE — SUTURE VCRL SZ 3-0 L18IN ABSRB UD L26MM SH 1/2 CIR J864D

## (undated) DEVICE — MINOR BREAST: Brand: MEDLINE INDUSTRIES, INC.

## (undated) DEVICE — SYRINGE, LUER LOCK, 10ML: Brand: MEDLINE

## (undated) DEVICE — INTENDED FOR TISSUE SEPARATION, AND OTHER PROCEDURES THAT REQUIRE A SHARP SURGICAL BLADE TO PUNCTURE OR CUT.: Brand: BARD-PARKER ® CARBON RIB-BACK BLADES

## (undated) DEVICE — BLADE ES ELASTOMERIC COAT INSUL DURABLE BEND UPTO 90DEG

## (undated) DEVICE — SUTURE VCRL SZ 3-0 L27IN ABSRB UD L26MM SH 1/2 CIR J416H

## (undated) DEVICE — SURGICAL SET UP - SURE SET: Brand: MEDLINE INDUSTRIES, INC.

## (undated) DEVICE — YANKAUER,OPEN TIP,W/O VENT,STERILE: Brand: MEDLINE INDUSTRIES, INC.

## (undated) DEVICE — GLOVE SURG SZ 7 L12IN FNGR THK79MIL GRN LTX FREE

## (undated) DEVICE — SKIN AFFIX SURG ADHESIVE 72/CS 0.55ML: Brand: MEDLINE

## (undated) DEVICE — SHEET,DRAPE,40X58,STERILE: Brand: MEDLINE

## (undated) DEVICE — INTENDED USE FOR SURGICAL MARKING ON INTACT SKIN, ALSO PROVIDES A PERMANENT METHOD OF IDENTIFYING OBJECTS IN THE OPERATING ROOM: Brand: WRITESITE® PLUS MINI PREP RESISTANT MARKER

## (undated) DEVICE — MEDI-VAC YANKAUER SUCTION HANDLE: Brand: CARDINAL HEALTH

## (undated) DEVICE — SOLUTION IV 1000ML 0.9% SOD CHL

## (undated) DEVICE — GOWN,PREVENTION PLUS,XLN/XL,ST,24/CS: Brand: MEDLINE

## (undated) DEVICE — SURE SET-DOUBLE BASIN-LF: Brand: MEDLINE INDUSTRIES, INC.

## (undated) DEVICE — PROBE SET W/DRAPE

## (undated) DEVICE — SUTURE VICRYL + SZ 3-0 L18IN ABSRB UD SH 1/2 CIR TAPERCUT NDL VCP864D

## (undated) DEVICE — GOWN,SIRUS,POLYRNF,BRTHSLV,XLN/XL,20/CS: Brand: MEDLINE

## (undated) DEVICE — TOWEL,OR,DSP,ST,BLUE,STD,4/PK,20PK/CS: Brand: MEDLINE

## (undated) DEVICE — COVER LT HNDL BLU PLAS

## (undated) DEVICE — LIQUIBAND RAPID ADHESIVE 36/CS 0.8ML: Brand: MEDLINE

## (undated) DEVICE — NEPTUNE E-SEP SMOKE EVACUATION PENCIL, COATED, 70MM BLADE, PUSH BUTTON SWITCH: Brand: NEPTUNE E-SEP

## (undated) DEVICE — GLOVE ORANGE PI 8 1/2   MSG9085

## (undated) DEVICE — SUTURE MONOCRYL + SZ 4-0 L27IN ABSRB UD L19MM PS-2 3/8 CIR MCP426H

## (undated) DEVICE — SUTURE VICRYL + SZ 3-0 L27IN ABSRB UD L26MM SH 1/2 CIR VCP416H

## (undated) DEVICE — SPECIMEN ORIENTATION CHARMS, SIX DISTINCTLY SHAPED STERILE 10MM CHARMS: Brand: MARGINMAP